# Patient Record
Sex: FEMALE | Race: WHITE | NOT HISPANIC OR LATINO | Employment: FULL TIME | URBAN - METROPOLITAN AREA
[De-identification: names, ages, dates, MRNs, and addresses within clinical notes are randomized per-mention and may not be internally consistent; named-entity substitution may affect disease eponyms.]

---

## 2017-02-02 ENCOUNTER — OFFICE VISIT (OUTPATIENT)
Dept: INTERNAL MEDICINE | Facility: CLINIC | Age: 47
End: 2017-02-02

## 2017-02-02 VITALS
HEIGHT: 62 IN | DIASTOLIC BLOOD PRESSURE: 70 MMHG | BODY MASS INDEX: 37.5 KG/M2 | SYSTOLIC BLOOD PRESSURE: 122 MMHG | WEIGHT: 203.8 LBS | TEMPERATURE: 97.6 F

## 2017-02-02 DIAGNOSIS — R53.82 CHRONIC FATIGUE: Primary | ICD-10-CM

## 2017-02-02 DIAGNOSIS — F32.89 OTHER DEPRESSION: ICD-10-CM

## 2017-02-02 DIAGNOSIS — K58.9 IRRITABLE BOWEL SYNDROME, UNSPECIFIED TYPE: ICD-10-CM

## 2017-02-02 PROCEDURE — 99214 OFFICE O/P EST MOD 30 MIN: CPT | Performed by: INTERNAL MEDICINE

## 2017-02-02 RX ORDER — ESTRADIOL 1 MG/1
1 TABLET ORAL DAILY
Qty: 30 TABLET | Refills: 6 | Status: SHIPPED | OUTPATIENT
Start: 2017-02-02 | End: 2017-09-26 | Stop reason: SDUPTHER

## 2017-02-02 RX ORDER — DICYCLOMINE HYDROCHLORIDE 10 MG/1
10 CAPSULE ORAL 3 TIMES DAILY PRN
Qty: 180 CAPSULE | Refills: 2 | Status: SHIPPED | OUTPATIENT
Start: 2017-02-02 | End: 2017-09-26 | Stop reason: SDUPTHER

## 2017-02-02 RX ORDER — BUPROPION HYDROCHLORIDE 150 MG/1
150 TABLET ORAL DAILY
Qty: 30 TABLET | Refills: 6 | Status: SHIPPED | OUTPATIENT
Start: 2017-02-02 | End: 2017-03-21

## 2017-02-02 RX ORDER — MELOXICAM 15 MG/1
15 TABLET ORAL DAILY PRN
Qty: 90 TABLET | Refills: 3 | Status: SHIPPED | OUTPATIENT
Start: 2017-02-02 | End: 2017-09-26 | Stop reason: SDUPTHER

## 2017-02-02 RX ORDER — LEVOCETIRIZINE DIHYDROCHLORIDE 5 MG/1
5 TABLET, FILM COATED ORAL EVERY EVENING
Qty: 90 TABLET | Refills: 3 | Status: SHIPPED | OUTPATIENT
Start: 2017-02-02 | End: 2017-03-21

## 2017-02-02 RX ORDER — CHLORDIAZEPOXIDE HYDROCHLORIDE AND CLIDINIUM BROMIDE 5; 2.5 MG/1; MG/1
1 CAPSULE ORAL
Qty: 90 CAPSULE | Refills: 2 | Status: SHIPPED | OUTPATIENT
Start: 2017-02-02 | End: 2017-03-21

## 2017-02-02 RX ORDER — CHLORDIAZEPOXIDE HYDROCHLORIDE AND CLIDINIUM BROMIDE 5; 2.5 MG/1; MG/1
1 CAPSULE ORAL
Qty: 90 CAPSULE | Refills: 2 | Status: CANCELLED | OUTPATIENT
Start: 2017-02-02

## 2017-02-02 RX ORDER — FENOFIBRATE 145 MG/1
145 TABLET, COATED ORAL DAILY
Qty: 90 TABLET | Refills: 3 | Status: SHIPPED | OUTPATIENT
Start: 2017-02-02 | End: 2017-03-21

## 2017-02-02 NOTE — PATIENT INSTRUCTIONS
Bupropion tablets (Depression/Mood Disorders)  What is this medicine?  BUPROPION (byoo PROE pee on) is used to treat depression.  This medicine may be used for other purposes; ask your health care provider or pharmacist if you have questions.  What should I tell my health care provider before I take this medicine?  They need to know if you have any of these conditions:  -an eating disorder, such as anorexia or bulimia  -bipolar disorder or psychosis  -diabetes or high blood sugar, treated with medication  -glaucoma  -heart disease, previous heart attack, or irregular heart beat  -head injury or brain tumor  -high blood pressure  -kidney or liver disease  -seizures  -suicidal thoughts or a previous suicide attempt  -Tourette's syndrome  -weight loss  -an unusual or allergic reaction to bupropion, other medicines, foods, dyes, or preservatives  -breast-feeding  -pregnant or trying to become pregnant  How should I use this medicine?  Take this medicine by mouth with a glass of water. Follow the directions on the prescription label. You can take it with or without food. If it upsets your stomach, take it with food. Take your medicine at regular intervals. Do not take your medicine more often than directed. Do not stop taking this medicine suddenly except upon the advice of your doctor. Stopping this medicine too quickly may cause serious side effects or your condition may worsen.  A special MedGuide will be given to you by the pharmacist with each prescription and refill. Be sure to read this information carefully each time.  Talk to your pediatrician regarding the use of this medicine in children. Special care may be needed.  Overdosage: If you think you have taken too much of this medicine contact a poison control center or emergency room at once.  NOTE: This medicine is only for you. Do not share this medicine with others.  What if I miss a dose?  If you miss a dose, take it as soon as you can. If it is less than  four hours to your next dose, take only that dose and skip the missed dose. Do not take double or extra doses.  What may interact with this medicine?  Do not take this medicine with any of the following medications:  -linezolid  -MAOIs like Azilect, Carbex, Eldepryl, Marplan, Nardil, and Parnate  -methylene blue (injected into a vein)  -other medicines that contain bupropion like Zyban  This medicine may also interact with the following medications:  -alcohol  -certain medicines for anxiety or sleep  -certain medicines for blood pressure like metoprolol, propranolol  -certain medicines for depression or psychotic disturbances  -certain medicines for HIV or AIDS like efavirenz, lopinavir, nelfinavir, ritonavir  -certain medicines for irregular heart beat like propafenone, flecainide  -certain medicines for Parkinson's disease like amantadine, levodopa  -certain medicines for seizures like carbamazepine, phenytoin, phenobarbital  -cimetidine  -clopidogrel  -cyclophosphamide  -furazolidone  -isoniazid  -nicotine  -orphenadrine  -procarbazine  -steroid medicines like prednisone or cortisone  -stimulant medicines for attention disorders, weight loss, or to stay awake  -tamoxifen  -theophylline  -thiotepa  -ticlopidine  -tramadol  -warfarin  This list may not describe all possible interactions. Give your health care provider a list of all the medicines, herbs, non-prescription drugs, or dietary supplements you use. Also tell them if you smoke, drink alcohol, or use illegal drugs. Some items may interact with your medicine.  What should I watch for while using this medicine?  Tell your doctor if your symptoms do not get better or if they get worse. Visit your doctor or health care professional for regular checks on your progress. Because it may take several weeks to see the full effects of this medicine, it is important to continue your treatment as prescribed by your doctor.  Patients and their families should watch out  for new or worsening thoughts of suicide or depression. Also watch out for sudden changes in feelings such as feeling anxious, agitated, panicky, irritable, hostile, aggressive, impulsive, severely restless, overly excited and hyperactive, or not being able to sleep. If this happens, especially at the beginning of treatment or after a change in dose, call your health care professional.  Avoid alcoholic drinks while taking this medicine. Drinking excessive alcoholic beverages, using sleeping or anxiety medicines, or quickly stopping the use of these agents while taking this medicine may increase your risk for a seizure.  Do not drive or use heavy machinery until you know how this medicine affects you. This medicine can impair your ability to perform these tasks.  Do not take this medicine close to bedtime. It may prevent you from sleeping.  Your mouth may get dry. Chewing sugarless gum or sucking hard candy, and drinking plenty of water may help. Contact your doctor if the problem does not go away or is severe.  What side effects may I notice from receiving this medicine?  Side effects that you should report to your doctor or health care professional as soon as possible:  -allergic reactions like skin rash, itching or hives, swelling of the face, lips, or tongue  -breathing problems  -changes in vision  -confusion  -fast or irregular heartbeat  -hallucinations  -increased blood pressure  -redness, blistering, peeling or loosening of the skin, including inside the mouth  -seizures  -suicidal thoughts or other mood changes  -unusually weak or tired  -vomiting  Side effects that usually do not require medical attention (report to your doctor or health care professional if they continue or are bothersome):  -change in sex drive or performance  -constipation  -headache  -loss of appetite  -nausea  -tremors  -weight loss  This list may not describe all possible side effects. Call your doctor for medical advice about side  effects. You may report side effects to FDA at 1-692-ILA-1713.  Where should I keep my medicine?  Keep out of the reach of children.  Store at room temperature between 15 and 25 degrees C (59 and 77 degrees F), away from direct sunlight and moisture. Keep tightly closed. Throw away any unused medicine after the expiration date.  NOTE: This sheet is a summary. It may not cover all possible information. If you have questions about this medicine, talk to your doctor, pharmacist, or health care provider.     © 2016, [employer]. (2014-07-11 12:42:42)

## 2017-02-02 NOTE — PROGRESS NOTES
"Subjective   Nita Mcmahon is a 46 y.o. female here for follow-up on her mood and hormone replacement. She reports total lack of energy and complete lack of sex drive. She also mentions that she had pneumonia at the beginning of the year that she is just now feeling better from. She also had a couple of isolated episodes of extreme epigastric pain that was relieved by OTC zantac. She reports her mood is good, sleeping well, when she is with family and in a group mood is relatively stable but has more trouble with decreased mood when she is alone. She is concerned about her weight and is sincere in her desire to make healthy changes in her diet and activity levels.    The following portions of the patient's history were reviewed and updated as appropriate: allergies, current medications, past medical history, past social history, past surgical history and problem list.    Review of Systems:  General: Lack of energy  CV: negative  Respiratory: negative  GI: Acid Reflux  Neuro: negative  Psych: Depression    Objective   Visit Vitals   • /70 (BP Location: Left arm, Patient Position: Sitting, Cuff Size: Adult)   • Temp 97.6 °F (36.4 °C) (Temporal Artery )   • Ht 62\" (157.5 cm)   • Wt 203 lb 12.8 oz (92.4 kg)   • BMI 37.28 kg/m2       Physical Exam   Constitutional: She is oriented to person, place, and time. She appears well-developed and well-nourished.   Pulmonary/Chest: Effort normal.   Abdominal: She exhibits no distension.   Neurological: She is alert and oriented to person, place, and time.   Skin: Skin is warm and dry.   Psychiatric: She has a normal mood and affect. Her behavior is normal. Judgment and thought content normal.   Vitals reviewed.      Assessment/Plan   Nita was seen today for follow-up.    Diagnoses and all orders for this visit:    Chronic fatigue  -labs previously normal, if change from paxil to wellbutrin doesn't help will repeat labs to see if adjustment needs to be made  -discussed " reduced calorie diet for weight loss (2962-0503 cals per day) and getting some exercise as tolerated as losing weight will likely help with fatigue    Irritable bowel syndrome, unspecified type  -     dicyclomine (BENTYL) 10 MG capsule; Take 1 capsule by mouth 3 (Three) Times a Day As Needed (abdominal pain or bloating/gas).  -     clidinium-chlordiazePOXIDE (LIBRAX) 5-2.5 MG per capsule; Take 1 capsule by mouth 3 (Three) Times a Day With Meals.    Other depression  -     buPROPion XL (WELLBUTRIN XL) 150 MG 24 hr tablet; Take 1 tablet by mouth Daily. New med  -     Taper off paxil 20mg for 1 week then either stop med or go qod then stop    Other orders  -     meloxicam (MOBIC) 15 MG tablet; Take 1 tablet by mouth Daily As Needed for moderate pain (4-6).  -     levocetirizine (XYZAL) 5 MG tablet; Take 1 tablet by mouth Every Evening.  -     fenofibrate (TRICOR) 145 MG tablet; Take 1 tablet by mouth Daily.  -     estradiol (ESTRACE) 1 MG tablet; Take 1 tablet by mouth Daily.  -     Cancel: clidinium-chlordiazePOXIDE (LIBRAX) 5-2.5 MG per capsule; Take 1 capsule by mouth 3 (Three) Times a Day With Meals.         Written  By Lesli Richter, acting as scribe for Dr. Mary Dixon.    I, Mary Dixon MD, personally performed the services described in this documentation as scribed by the above named individual in my presence, and it is both accurate and complete.  2/2/2017  12:28 PM

## 2017-02-13 ENCOUNTER — TELEPHONE (OUTPATIENT)
Dept: INTERNAL MEDICINE | Facility: CLINIC | Age: 47
End: 2017-02-13

## 2017-02-13 NOTE — TELEPHONE ENCOUNTER
----- Message from Mary Dixon MD sent at 2/13/2017  8:01 AM EST -----  Contact: [phone]  Have her come in for a fasting lipid profile. So I can see what I'm working with cholesterol wise.     Lm advising patient.nh    ----- Message -----     From: Jasmyne Mcfadden MA     Sent: 2/10/2017   1:16 PM       To: Mary Dixon MD     Is this ok?  ----- Message -----     From: Omari Betancourt     Sent: 2/10/2017   9:37 AM       To: Jasmyne Mcfadden MA    INSURANCE HAS CHANGED AND ISN'T COVERING TRICOR, PHARMACY TOLD HER TO TRY LOVASTATIN

## 2017-02-14 DIAGNOSIS — E78.2 MIXED HYPERLIPIDEMIA: Primary | ICD-10-CM

## 2017-02-14 LAB
ARTICHOKE IGE QN: 154 MG/DL (ref 0–130)
CHOLEST SERPL-MCNC: 232 MG/DL (ref 0–200)
HDLC SERPL-MCNC: 59 MG/DL (ref 40–60)
TRIGL SERPL-MCNC: 324 MG/DL (ref 0–150)

## 2017-02-14 PROCEDURE — 80061 LIPID PANEL: CPT | Performed by: INTERNAL MEDICINE

## 2017-02-16 ENCOUNTER — TELEPHONE (OUTPATIENT)
Dept: INTERNAL MEDICINE | Facility: CLINIC | Age: 47
End: 2017-02-16

## 2017-02-16 NOTE — TELEPHONE ENCOUNTER
----- Message from Mary Dixon MD sent at 2/15/2017  2:10 PM EST -----  Please call the patient and tell her cholesterol was high, but not high enough for me to want to continue her cholesterol medication right now so she will not get a new one. I will repeat the cholesterol panel again in 3 months to ensure that it hasn't gone up and would need a medication, but right now she doesn't.      Patient advised.nh

## 2017-03-21 ENCOUNTER — OFFICE VISIT (OUTPATIENT)
Dept: INTERNAL MEDICINE | Facility: CLINIC | Age: 47
End: 2017-03-21

## 2017-03-21 VITALS
BODY MASS INDEX: 37.25 KG/M2 | SYSTOLIC BLOOD PRESSURE: 120 MMHG | DIASTOLIC BLOOD PRESSURE: 68 MMHG | WEIGHT: 202.4 LBS | TEMPERATURE: 98.7 F | HEIGHT: 62 IN

## 2017-03-21 DIAGNOSIS — F41.0 PANIC ATTACK: ICD-10-CM

## 2017-03-21 DIAGNOSIS — F41.9 ANXIETY: Primary | ICD-10-CM

## 2017-03-21 DIAGNOSIS — N95.1 MENOPAUSAL SYNDROME: ICD-10-CM

## 2017-03-21 LAB
ESTRADIOL SERPL HS-MCNC: 119 PG/ML
FSH SERPL-ACNC: 52.3 MIU/ML
LH SERPL-ACNC: 43.2 MIU/ML
T4 FREE SERPL-MCNC: 1.01 NG/DL (ref 0.89–1.76)
TSH SERPL DL<=0.05 MIU/L-ACNC: 2.46 MIU/ML (ref 0.35–5.35)

## 2017-03-21 PROCEDURE — 82670 ASSAY OF TOTAL ESTRADIOL: CPT | Performed by: INTERNAL MEDICINE

## 2017-03-21 PROCEDURE — 84439 ASSAY OF FREE THYROXINE: CPT | Performed by: INTERNAL MEDICINE

## 2017-03-21 PROCEDURE — 83002 ASSAY OF GONADOTROPIN (LH): CPT | Performed by: INTERNAL MEDICINE

## 2017-03-21 PROCEDURE — 84443 ASSAY THYROID STIM HORMONE: CPT | Performed by: INTERNAL MEDICINE

## 2017-03-21 PROCEDURE — 36415 COLL VENOUS BLD VENIPUNCTURE: CPT | Performed by: INTERNAL MEDICINE

## 2017-03-21 PROCEDURE — 83001 ASSAY OF GONADOTROPIN (FSH): CPT | Performed by: INTERNAL MEDICINE

## 2017-03-21 PROCEDURE — 99213 OFFICE O/P EST LOW 20 MIN: CPT | Performed by: INTERNAL MEDICINE

## 2017-03-21 PROCEDURE — 82672 ASSAY OF ESTROGEN: CPT | Performed by: INTERNAL MEDICINE

## 2017-03-21 RX ORDER — BUPROPION HYDROCHLORIDE 75 MG/1
75 TABLET ORAL 2 TIMES DAILY
Qty: 17 TABLET | Refills: 0 | Status: SHIPPED | OUTPATIENT
Start: 2017-03-21 | End: 2017-04-20

## 2017-03-21 RX ORDER — ESCITALOPRAM OXALATE 20 MG/1
20 TABLET ORAL DAILY
Qty: 30 TABLET | Refills: 5 | Status: SHIPPED | OUTPATIENT
Start: 2017-03-21 | End: 2017-09-22 | Stop reason: SDUPTHER

## 2017-03-21 NOTE — PROGRESS NOTES
"Subjective   Nita Mcmahon is a 46 y.o. female here for follow-up anxiety. She was not able to tolerate coming off of the paxil, she felt dizzy and \"off\" and is still on the 20mg which is only half her regular dose. Wellbutrin did not help her anxiety, it seems to have made it worse.  It also caused vivid dreams and pt felt like she wasn't really sleeping well as her dreams were so long and involved. She had a panic attack at the mall the other day. Has always had anxiety but just getting worse. Difficulty falling asleep as well as anxiety is increased at night.      The following portions of the patient's history were reviewed and updated as appropriate: allergies, current medications, past medical history, past social history and problem list.    Review of Systems:  General: negative  CV: negative  Respiratory: negative  Neuro: insomnia  Psych: anxiety, panic attack    Objective   Visit Vitals   • /68 (BP Location: Right arm, Patient Position: Sitting, Cuff Size: Adult)   • Temp 98.7 °F (37.1 °C) (Temporal Artery )   • Ht 62\" (157.5 cm)   • Wt 202 lb 6.4 oz (91.8 kg)   • BMI 37.02 kg/m2       Physical Exam   Constitutional: She is oriented to person, place, and time. She appears well-developed and well-nourished.   Pulmonary/Chest: Effort normal.   Neurological: She is alert and oriented to person, place, and time.   Skin: Skin is warm and dry.   Psychiatric: She has a normal mood and affect. Her behavior is normal. Judgment and thought content normal.   Vitals reviewed.      Assessment/Plan   Nita was seen today for anxiety and depression.    Diagnoses and all orders for this visit:    Anxiety  -     buPROPion (WELLBUTRIN) 75 MG tablet; Take 1 tablet by mouth 2 (Two) Times a Day. For 3 days, then 1 tablet midday for 1 week, then stop medication.  -     escitalopram (LEXAPRO) 20 MG tablet; Take 1 tablet by mouth Daily. Directly substitute for Paxil  -     Discussed psych treatment with pt and how it is " an evolving process to find the right med and unfortunately we will probably have to try several different meds to find one that        adequately controls her sx  -     TSH  -     T4, Free  -     Estrogens, Total  -     Estradiol  -     Follicle Stimulating Hormone  -     Luteinizing Hormone    Panic attack  -     buPROPion (WELLBUTRIN) 75 MG tablet; Take 1 tablet by mouth 2 (Two) Times a Day. For 3 days, then 1 tablet midday for 1 week, then stop medication.  -     escitalopram (LEXAPRO) 20 MG tablet; Take 1 tablet by mouth Daily.  -     TSH  -     T4, Free  -     Estrogens, Total  -     Estradiol  -     Follicle Stimulating Hormone  -     Luteinizing Hormone    Menopausal syndrome  -     TSH  -     T4, Free  -     Estrogens, Total  -     Estradiol  -     Follicle Stimulating Hormone  -     Luteinizing Hormone

## 2017-03-21 NOTE — PATIENT INSTRUCTIONS
Escitalopram tablets  What is this medicine?  ESCITALOPRAM (es sye PINKY oh pram) is used to treat depression and certain types of anxiety.  This medicine may be used for other purposes; ask your health care provider or pharmacist if you have questions.  COMMON BRAND NAME(S): Lexapro  What should I tell my health care provider before I take this medicine?  They need to know if you have any of these conditions:  -bipolar disorder or a family history of bipolar disorder  -diabetes  -glaucoma  -heart disease  -kidney or liver disease  -receiving electroconvulsive therapy  -seizures (convulsions)  -suicidal thoughts, plans, or attempt by you or a family member  -an unusual or allergic reaction to escitalopram, the related drug citalopram, other medicines, foods, dyes, or preservatives  -pregnant or trying to become pregnant  -breast-feeding  How should I use this medicine?  Take this medicine by mouth with a glass of water. Follow the directions on the prescription label. You can take it with or without food. If it upsets your stomach, take it with food. Take your medicine at regular intervals. Do not take it more often than directed. Do not stop taking this medicine suddenly except upon the advice of your doctor. Stopping this medicine too quickly may cause serious side effects or your condition may worsen.  A special MedGuide will be given to you by the pharmacist with each prescription and refill. Be sure to read this information carefully each time.  Talk to your pediatrician regarding the use of this medicine in children. Special care may be needed.  Overdosage: If you think you have taken too much of this medicine contact a poison control center or emergency room at once.  NOTE: This medicine is only for you. Do not share this medicine with others.  What if I miss a dose?  If you miss a dose, take it as soon as you can. If it is almost time for your next dose, take only that dose. Do not take double or extra  doses.  What may interact with this medicine?  Do not take this medicine with any of the following medications:  -certain medicines for fungal infections like fluconazole, itraconazole, ketoconazole, posaconazole, voriconazole  -cisapride  -citalopram  -dofetilide  -dronedarone  -linezolid  -MAOIs like Carbex, Eldepryl, Marplan, Nardil, and Parnate  -methylene blue (injected into a vein)  -pimozide  -thioridazine  -ziprasidone  This medicine may also interact with the following medications:  -alcohol  -aspirin and aspirin-like medicines  -carbamazepine  -certain medicines for depression, anxiety, or psychotic disturbances  -certain medicines for migraine headache like almotriptan, eletriptan, frovatriptan, naratriptan, rizatriptan, sumatriptan, zolmitriptan  -certain medicines for sleep  -certain medicines that treat or prevent blood clots like warfarin, enoxaparin, dalteparin  -cimetidine  -diuretics  -fentanyl  -furazolidone  -isoniazid  -lithium  -metoprolol  -NSAIDs, medicines for pain and inflammation, like ibuprofen or naproxen  -other medicines that prolong the QT interval (cause an abnormal heart rhythm)  -procarbazine  -rasagiline  -supplements like [place]'s wort, kava kava, valerian  -tramadol  -tryptophan  This list may not describe all possible interactions. Give your health care provider a list of all the medicines, herbs, non-prescription drugs, or dietary supplements you use. Also tell them if you smoke, drink alcohol, or use illegal drugs. Some items may interact with your medicine.  What should I watch for while using this medicine?  Tell your doctor if your symptoms do not get better or if they get worse. Visit your doctor or health care professional for regular checks on your progress. Because it may take several weeks to see the full effects of this medicine, it is important to continue your treatment as prescribed by your doctor.  Patients and their families should watch out for new or  worsening thoughts of suicide or depression. Also watch out for sudden changes in feelings such as feeling anxious, agitated, panicky, irritable, hostile, aggressive, impulsive, severely restless, overly excited and hyperactive, or not being able to sleep. If this happens, especially at the beginning of treatment or after a change in dose, call your health care professional.  You may get drowsy or dizzy. Do not drive, use machinery, or do anything that needs mental alertness until you know how this medicine affects you. Do not stand or sit up quickly, especially if you are an older patient. This reduces the risk of dizzy or fainting spells. Alcohol may interfere with the effect of this medicine. Avoid alcoholic drinks.  Your mouth may get dry. Chewing sugarless gum or sucking hard candy, and drinking plenty of water may help. Contact your doctor if the problem does not go away or is severe.  What side effects may I notice from receiving this medicine?  Side effects that you should report to your doctor or health care professional as soon as possible:  -allergic reactions like skin rash, itching or hives, swelling of the face, lips, or tongue  -confusion  -feeling faint or lightheaded, falls  -fast talking and excited feelings or actions that are out of control  -hallucination, loss of contact with reality  -seizures  -suicidal thoughts or other mood changes  -unusual bleeding or bruising  Side effects that usually do not require medical attention (report to your doctor or health care professional if they continue or are bothersome):  -blurred vision  -changes in appetite  -change in sex drive or performance  -headache  -increased sweating  -nausea  This list may not describe all possible side effects. Call your doctor for medical advice about side effects. You may report side effects to FDA at 3-161-FDA-0182.  Where should I keep my medicine?  Keep out of reach of children.  Store at room temperature between 15 and  30 degrees C (59 and 86 degrees F). Throw away any unused medicine after the expiration date.  NOTE: This sheet is a summary. It may not cover all possible information. If you have questions about this medicine, talk to your doctor, pharmacist, or health care provider.     © 2016, [employer]. (2014-07-15 12:32:55)

## 2017-03-23 LAB — ESTROGEN SERPL-MCNC: 328 PG/ML

## 2017-04-20 ENCOUNTER — OFFICE VISIT (OUTPATIENT)
Dept: INTERNAL MEDICINE | Facility: CLINIC | Age: 47
End: 2017-04-20

## 2017-04-20 VITALS
SYSTOLIC BLOOD PRESSURE: 112 MMHG | TEMPERATURE: 97.4 F | HEIGHT: 62 IN | WEIGHT: 205 LBS | DIASTOLIC BLOOD PRESSURE: 64 MMHG | BODY MASS INDEX: 37.73 KG/M2

## 2017-04-20 DIAGNOSIS — F41.9 ANXIETY AND DEPRESSION: Primary | ICD-10-CM

## 2017-04-20 DIAGNOSIS — F32.A ANXIETY AND DEPRESSION: Primary | ICD-10-CM

## 2017-04-20 PROCEDURE — 99213 OFFICE O/P EST LOW 20 MIN: CPT | Performed by: INTERNAL MEDICINE

## 2017-04-20 RX ORDER — LEVOCETIRIZINE DIHYDROCHLORIDE 5 MG/1
5 TABLET, FILM COATED ORAL EVERY EVENING
COMMUNITY
End: 2018-04-12

## 2017-04-20 NOTE — PROGRESS NOTES
"Subjective   Nita Mcmahon is a 47 y.o. female here for follow-up A&D. She was on paxil and wellbutrin last visit and continued to have irritability, no energy, and A&D. She successfully came off the wellbutrin and started lexapro and mood is much improved. She is very pleased with this. Says  has noticed that she is doing much better mood wise as well. Denies any undesirable SE.       The following portions of the patient's history were reviewed and updated as appropriate: allergies, current medications, past medical history, past social history and problem list.    Review of Systems:  General: negative  GI: negative  Neuro: negative  Psych: negative    Objective   /64 (BP Location: Right arm, Patient Position: Sitting, Cuff Size: Adult)  Temp 97.4 °F (36.3 °C) (Temporal Artery )   Ht 62\" (157.5 cm)  Wt 205 lb (93 kg)  BMI 37.49 kg/m2    Physical Exam   Constitutional: She is oriented to person, place, and time. She appears well-developed and well-nourished.   Pulmonary/Chest: Effort normal.   Neurological: She is alert and oriented to person, place, and time.   Skin: Skin is warm and dry.   Psychiatric: She has a normal mood and affect. Her behavior is normal. Judgment and thought content normal.   Vitals reviewed.      Assessment/Plan   Nita was seen today for anxiety and depression.    Diagnoses and all orders for this visit:    Anxiety and depression  -doing well on the Lexapro and is off the wellbutrin. Pt pleased with overall mood and not having any undesirable SE       "

## 2017-04-20 NOTE — PATIENT INSTRUCTIONS
Escitalopram tablets  What is this medicine?  ESCITALOPRAM (es sye PINKY oh pram) is used to treat depression and certain types of anxiety.  This medicine may be used for other purposes; ask your health care provider or pharmacist if you have questions.  COMMON BRAND NAME(S): Lexapro  What should I tell my health care provider before I take this medicine?  They need to know if you have any of these conditions:  -bipolar disorder or a family history of bipolar disorder  -diabetes  -glaucoma  -heart disease  -kidney or liver disease  -receiving electroconvulsive therapy  -seizures (convulsions)  -suicidal thoughts, plans, or attempt by you or a family member  -an unusual or allergic reaction to escitalopram, the related drug citalopram, other medicines, foods, dyes, or preservatives  -pregnant or trying to become pregnant  -breast-feeding  How should I use this medicine?  Take this medicine by mouth with a glass of water. Follow the directions on the prescription label. You can take it with or without food. If it upsets your stomach, take it with food. Take your medicine at regular intervals. Do not take it more often than directed. Do not stop taking this medicine suddenly except upon the advice of your doctor. Stopping this medicine too quickly may cause serious side effects or your condition may worsen.  A special MedGuide will be given to you by the pharmacist with each prescription and refill. Be sure to read this information carefully each time.  Talk to your pediatrician regarding the use of this medicine in children. Special care may be needed.  Overdosage: If you think you have taken too much of this medicine contact a poison control center or emergency room at once.  NOTE: This medicine is only for you. Do not share this medicine with others.  What if I miss a dose?  If you miss a dose, take it as soon as you can. If it is almost time for your next dose, take only that dose. Do not take double or extra  doses.  What may interact with this medicine?  Do not take this medicine with any of the following medications:  -certain medicines for fungal infections like fluconazole, itraconazole, ketoconazole, posaconazole, voriconazole  -cisapride  -citalopram  -dofetilide  -dronedarone  -linezolid  -MAOIs like Carbex, Eldepryl, Marplan, Nardil, and Parnate  -methylene blue (injected into a vein)  -pimozide  -thioridazine  -ziprasidone  This medicine may also interact with the following medications:  -alcohol  -amphetamines  -aspirin and aspirin-like medicines  -carbamazepine  -certain medicines for depression, anxiety, or psychotic disturbances  -certain medicines for migraine headache like almotriptan, eletriptan, frovatriptan, naratriptan, rizatriptan, sumatriptan, zolmitriptan  -certain medicines for sleep  -certain medicines that treat or prevent blood clots like warfarin, enoxaparin, dalteparin  -cimetidine  -diuretics  -fentanyl  -furazolidone  -isoniazid  -lithium  -metoprolol  -NSAIDs, medicines for pain and inflammation, like ibuprofen or naproxen  -other medicines that prolong the QT interval (cause an abnormal heart rhythm)  -procarbazine  -rasagiline  -supplements like [place]'s wort, kava kava, valerian  -tramadol  -tryptophan  This list may not describe all possible interactions. Give your health care provider a list of all the medicines, herbs, non-prescription drugs, or dietary supplements you use. Also tell them if you smoke, drink alcohol, or use illegal drugs. Some items may interact with your medicine.  What should I watch for while using this medicine?  Tell your doctor if your symptoms do not get better or if they get worse. Visit your doctor or health care professional for regular checks on your progress. Because it may take several weeks to see the full effects of this medicine, it is important to continue your treatment as prescribed by your doctor.  Patients and their families should watch out for  new or worsening thoughts of suicide or depression. Also watch out for sudden changes in feelings such as feeling anxious, agitated, panicky, irritable, hostile, aggressive, impulsive, severely restless, overly excited and hyperactive, or not being able to sleep. If this happens, especially at the beginning of treatment or after a change in dose, call your health care professional.  You may get drowsy or dizzy. Do not drive, use machinery, or do anything that needs mental alertness until you know how this medicine affects you. Do not stand or sit up quickly, especially if you are an older patient. This reduces the risk of dizzy or fainting spells. Alcohol may interfere with the effect of this medicine. Avoid alcoholic drinks.  Your mouth may get dry. Chewing sugarless gum or sucking hard candy, and drinking plenty of water may help. Contact your doctor if the problem does not go away or is severe.  What side effects may I notice from receiving this medicine?  Side effects that you should report to your doctor or health care professional as soon as possible:  -allergic reactions like skin rash, itching or hives, swelling of the face, lips, or tongue  -confusion  -feeling faint or lightheaded, falls  -fast talking and excited feelings or actions that are out of control  -hallucination, loss of contact with reality  -seizures  -suicidal thoughts or other mood changes  -unusual bleeding or bruising  Side effects that usually do not require medical attention (report to your doctor or health care professional if they continue or are bothersome):  -blurred vision  -changes in appetite  -change in sex drive or performance  -headache  -increased sweating  -nausea  This list may not describe all possible side effects. Call your doctor for medical advice about side effects. You may report side effects to FDA at 0-096-FDA-9637.  Where should I keep my medicine?  Keep out of reach of children.  Store at room temperature between  15 and 30 degrees C (59 and 86 degrees F). Throw away any unused medicine after the expiration date.  NOTE: This sheet is a summary. It may not cover all possible information. If you have questions about this medicine, talk to your doctor, pharmacist, or health care provider.     © 2017, [employer]. (2016-12-12 17:52:54)

## 2017-05-09 ENCOUNTER — OFFICE VISIT (OUTPATIENT)
Dept: INTERNAL MEDICINE | Facility: CLINIC | Age: 47
End: 2017-05-09

## 2017-05-09 VITALS
DIASTOLIC BLOOD PRESSURE: 64 MMHG | BODY MASS INDEX: 37.47 KG/M2 | SYSTOLIC BLOOD PRESSURE: 118 MMHG | HEIGHT: 62 IN | TEMPERATURE: 97.1 F | WEIGHT: 203.6 LBS

## 2017-05-09 DIAGNOSIS — K80.50 BILIARY COLIC: Primary | ICD-10-CM

## 2017-05-09 PROCEDURE — 99213 OFFICE O/P EST LOW 20 MIN: CPT | Performed by: INTERNAL MEDICINE

## 2017-09-22 DIAGNOSIS — F41.9 ANXIETY: ICD-10-CM

## 2017-09-22 DIAGNOSIS — F41.0 PANIC ATTACK: ICD-10-CM

## 2017-09-22 RX ORDER — ESCITALOPRAM OXALATE 20 MG/1
20 TABLET ORAL DAILY
Qty: 30 TABLET | Refills: 5 | Status: SHIPPED | OUTPATIENT
Start: 2017-09-22 | End: 2017-11-07

## 2017-09-26 ENCOUNTER — OFFICE VISIT (OUTPATIENT)
Dept: INTERNAL MEDICINE | Facility: CLINIC | Age: 47
End: 2017-09-26

## 2017-09-26 VITALS
BODY MASS INDEX: 35.08 KG/M2 | HEART RATE: 85 BPM | SYSTOLIC BLOOD PRESSURE: 126 MMHG | WEIGHT: 198 LBS | TEMPERATURE: 97.6 F | DIASTOLIC BLOOD PRESSURE: 82 MMHG | OXYGEN SATURATION: 99 % | HEIGHT: 63 IN | RESPIRATION RATE: 18 BRPM

## 2017-09-26 DIAGNOSIS — K58.9 IRRITABLE BOWEL SYNDROME, UNSPECIFIED TYPE: ICD-10-CM

## 2017-09-26 DIAGNOSIS — F32.A ANXIETY AND DEPRESSION: Primary | ICD-10-CM

## 2017-09-26 DIAGNOSIS — F41.9 ANXIETY AND DEPRESSION: Primary | ICD-10-CM

## 2017-09-26 PROCEDURE — 99213 OFFICE O/P EST LOW 20 MIN: CPT | Performed by: INTERNAL MEDICINE

## 2017-09-26 RX ORDER — MELOXICAM 15 MG/1
15 TABLET ORAL DAILY PRN
Qty: 90 TABLET | Refills: 3 | Status: SHIPPED | OUTPATIENT
Start: 2017-09-26 | End: 2018-04-12 | Stop reason: SDUPTHER

## 2017-09-26 RX ORDER — ESTRADIOL 1 MG/1
1 TABLET ORAL DAILY
Qty: 30 TABLET | Refills: 6 | Status: SHIPPED | OUTPATIENT
Start: 2017-09-26 | End: 2018-04-12 | Stop reason: SDUPTHER

## 2017-09-26 RX ORDER — CITALOPRAM 40 MG/1
40 TABLET ORAL DAILY
Qty: 30 TABLET | Refills: 5 | Status: SHIPPED | OUTPATIENT
Start: 2017-09-26 | End: 2018-04-12 | Stop reason: SDUPTHER

## 2017-09-26 RX ORDER — DICYCLOMINE HYDROCHLORIDE 10 MG/1
10 CAPSULE ORAL 3 TIMES DAILY PRN
Qty: 180 CAPSULE | Refills: 2 | Status: SHIPPED | OUTPATIENT
Start: 2017-09-26 | End: 2018-04-12 | Stop reason: SDUPTHER

## 2017-09-26 NOTE — PATIENT INSTRUCTIONS
Citalopram tablets  What is this medicine?  CITALOPRAM (sye PINKY oh pram) is a medicine for depression.  This medicine may be used for other purposes; ask your health care provider or pharmacist if you have questions.  COMMON BRAND NAME(S): Celexa  What should I tell my health care provider before I take this medicine?  They need to know if you have any of these conditions:  -bleeding disorders  -bipolar disorder or a family history of bipolar disorder  -glaucoma  -heart disease  -history of irregular heartbeat  -kidney disease  -liver disease  -low levels of magnesium or potassium in the blood  -receiving electroconvulsive therapy  -seizures  -suicidal thoughts, plans, or attempt; a previous suicide attempt by you or a family member  -take medicines that treat or prevent blood clots  -thyroid disease  -an unusual or allergic reaction to citalopram, escitalopram, other medicines, foods, dyes, or preservatives  -pregnant or trying to become pregnant  -breast-feeding  How should I use this medicine?  Take this medicine by mouth with a glass of water. Follow the directions on the prescription label. You can take it with or without food. Take your medicine at regular intervals. Do not take your medicine more often than directed. Do not stop taking this medicine suddenly except upon the advice of your doctor. Stopping this medicine too quickly may cause serious side effects or your condition may worsen.  A special MedGuide will be given to you by the pharmacist with each prescription and refill. Be sure to read this information carefully each time.  Talk to your pediatrician regarding the use of this medicine in children. Special care may be needed.  Patients over 60 years old may have a stronger reaction and need a smaller dose.  Overdosage: If you think you have taken too much of this medicine contact a poison control center or emergency room at once.  NOTE: This medicine is only for you. Do not share this medicine with  others.  What if I miss a dose?  If you miss a dose, take it as soon as you can. If it is almost time for your next dose, take only that dose. Do not take double or extra doses.  What may interact with this medicine?  Do not take this medicine with any of the following medications:  -certain medicines for fungal infections like fluconazole, itraconazole, ketoconazole, posaconazole, voriconazole  -cisapride  -dofetilide  -dronedarone  -escitalopram  -linezolid  -MAOIs like Carbex, Eldepryl, Marplan, Nardil, and Parnate  -methylene blue (injected into a vein)  -pimozide  -thioridazine  -ziprasidone  This medicine may also interact with the following medications:  -alcohol  -amphetamines  -aspirin and aspirin-like medicines  -carbamazepine  -certain medicines for depression, anxiety, or psychotic disturbances  -certain medicines for infections like chloroquine, clarithromycin, erythromycin, furazolidone, isoniazid, pentamidine  -certain medicines for migraine headaches like almotriptan, eletriptan, frovatriptan, naratriptan, rizatriptan, sumatriptan, zolmitriptan  -certain medicines for sleep  -certain medicines that treat or prevent blood clots like dalteparin, enoxaparin, warfarin  -cimetidine  -diuretics  -fentanyl  -lithium  -methadone  -metoprolol  -NSAIDs, medicines for pain and inflammation, like ibuprofen or naproxen  -omeprazole  -other medicines that prolong the QT interval (cause an abnormal heart rhythm)  -procarbazine  -rasagiline  -supplements like Jeffrey's wort, kava kava, valerian  -tramadol  -tryptophan  This list may not describe all possible interactions. Give your health care provider a list of all the medicines, herbs, non-prescription drugs, or dietary supplements you use. Also tell them if you smoke, drink alcohol, or use illegal drugs. Some items may interact with your medicine.  What should I watch for while using this medicine?  Tell your doctor if your symptoms do not get better or if they  get worse. Visit your doctor or health care professional for regular checks on your progress. Because it may take several weeks to see the full effects of this medicine, it is important to continue your treatment as prescribed by your doctor.  Patients and their families should watch out for new or worsening thoughts of suicide or depression. Also watch out for sudden changes in feelings such as feeling anxious, agitated, panicky, irritable, hostile, aggressive, impulsive, severely restless, overly excited and hyperactive, or not being able to sleep. If this happens, especially at the beginning of treatment or after a change in dose, call your health care professional.  You may get drowsy or dizzy. Do not drive, use machinery, or do anything that needs mental alertness until you know how this medicine affects you. Do not stand or sit up quickly, especially if you are an older patient. This reduces the risk of dizzy or fainting spells. Alcohol may interfere with the effect of this medicine. Avoid alcoholic drinks.  Your mouth may get dry. Chewing sugarless gum or sucking hard candy, and drinking plenty of water will help. Contact your doctor if the problem does not go away or is severe.  What side effects may I notice from receiving this medicine?  Side effects that you should report to your doctor or health care professional as soon as possible:  -allergic reactions like skin rash, itching or hives, swelling of the face, lips, or tongue  -black, tarry stools  -breathing problems  -changes in vision  -chest pain  -confusion  -eye pain  -fast, irregular heartbeat  -feeling faint or lightheaded, falls  -hallucination, loss of contact with reality  -loss of balance or coordination  -loss of memory  -restlessness, pacing, inability to keep still  -seizures  -stiff muscles  -suicidal thoughts or other mood changes  -trouble sleeping  -unusual bleeding or bruising  -unusually weak or tired  -vomiting  Side effects that  usually do not require medical attention (report to your doctor or health care professional if they continue or are bothersome):  -change in appetite or weight  -change in sex drive or performance  -dizziness  -headache  -increased sweating  -indigestion, nausea  -tremor  This list may not describe all possible side effects. Call your doctor for medical advice about side effects. You may report side effects to FDA at 1-225-GTA-5075.  Where should I keep my medicine?  Keep out of reach of children.  Store at room temperature between 15 and 30 degrees C (59 and 86 degrees F). Throw away any unused medicine after the expiration date.  NOTE: This sheet is a summary. It may not cover all possible information. If you have questions about this medicine, talk to your doctor, pharmacist, or health care provider.     © 2017, [employer]. (2017-02-07 11:53:44)

## 2017-09-26 NOTE — PROGRESS NOTES
"Subjective   Nita Mcmahon is a 47 y.o. female here for follow-up A&D. The lexapro had been doing well for her mood wise, but she is having some negative SE. She feels fatigued all day, could nap all day if she was able. Has little motivation to get out and do things and has lost enjoyment of things she once liked to do. Trouble sleeping as well. Had transitioned her 20mg lexapro from night to morning and this has been making her even more tired. Just filled lexapro rx. Has also tried wellbutrin and paxil in the past with failure.    The following portions of the patient's history were reviewed and updated as appropriate: allergies, current medications, past medical history, past social history and problem list.    Review of Systems:  General: fatigue  Neuro: negative  Psych: see hpi    Objective   /82  Pulse 85  Temp 97.6 °F (36.4 °C) (Temporal Artery )   Resp 18  Ht 62.5\" (158.8 cm)  Wt 198 lb (89.8 kg)  SpO2 99%  BMI 35.64 kg/m2    Physical Exam   Constitutional: She is oriented to person, place, and time. She appears well-developed and well-nourished.   Pulmonary/Chest: Effort normal.   Neurological: She is alert and oriented to person, place, and time.   Skin: Skin is warm and dry.   Psychiatric: She has a normal mood and affect. Her behavior is normal. Judgment and thought content normal.   Vitals reviewed.      Assessment/Plan   Nita was seen today for anxiety.    Diagnoses and all orders for this visit:    Anxiety and depression  -     citalopram (CELEXA) 40 MG tablet; Take 1 tablet by mouth Daily.  -     Will take 1/2 tab lexapro BID for a few days then switch to 20mg qhs as she is experiencing fatigue. Will then switch to citalopram when lexapro runs out    Irritable bowel syndrome, unspecified type  -     dicyclomine (BENTYL) 10 MG capsule; Take 1 capsule by mouth 3 (Three) Times a Day As Needed (abdominal pain or bloating/gas).    Other orders  -     meloxicam (MOBIC) 15 MG tablet; Take " 1 tablet by mouth Daily As Needed for Moderate Pain .  -     estradiol (ESTRACE) 1 MG tablet; Take 1 tablet by mouth Daily.

## 2017-11-07 ENCOUNTER — OFFICE VISIT (OUTPATIENT)
Dept: INTERNAL MEDICINE | Facility: CLINIC | Age: 47
End: 2017-11-07

## 2017-11-07 VITALS
RESPIRATION RATE: 12 BRPM | OXYGEN SATURATION: 96 % | WEIGHT: 200.2 LBS | DIASTOLIC BLOOD PRESSURE: 84 MMHG | HEIGHT: 62 IN | BODY MASS INDEX: 36.84 KG/M2 | HEART RATE: 87 BPM | SYSTOLIC BLOOD PRESSURE: 138 MMHG | TEMPERATURE: 96.3 F

## 2017-11-07 DIAGNOSIS — F41.9 ANXIETY AND DEPRESSION: Primary | ICD-10-CM

## 2017-11-07 DIAGNOSIS — F32.A ANXIETY AND DEPRESSION: Primary | ICD-10-CM

## 2017-11-07 PROCEDURE — 99213 OFFICE O/P EST LOW 20 MIN: CPT | Performed by: INTERNAL MEDICINE

## 2017-11-07 NOTE — PROGRESS NOTES
"Subjective   Nita Mcmahon is a 47 y.o. female here for follow-up A&D. She has now been on the full dose of Celexa for 2 weeks now and finally her mood has improved. She has noticed an improvement in a short time and she is very pleased about this.  No negative SE.  Wants to continue on with this med.           The following portions of the patient's history were reviewed and updated as appropriate: allergies, current medications, past medical history, past social history and problem list.    Review of Systems:  General: negative  Neuro: negative  Psych: negative    Objective   /84  Pulse 87  Temp 96.3 °F (35.7 °C) (Temporal Artery )   Resp 12  Ht 62\" (157.5 cm)  Wt 200 lb 3.2 oz (90.8 kg)  SpO2 96%  BMI 36.62 kg/m2    Physical Exam   Constitutional: She is oriented to person, place, and time. She appears well-developed and well-nourished.   Pulmonary/Chest: Effort normal.   Neurological: She is alert and oriented to person, place, and time.   Skin: Skin is warm and dry.   Psychiatric: She has a normal mood and affect. Her behavior is normal. Judgment and thought content normal.   Vitals reviewed.      Assessment/Plan   Nita was seen today for anxiety and depression.    Diagnoses and all orders for this visit:    Anxiety and depression  -doing well on celexa, mood is improved  -continue on current dose        "

## 2017-11-07 NOTE — PATIENT INSTRUCTIONS
Citalopram tablets  What is this medicine?  CITALOPRAM (sye PINKY oh pram) is a medicine for depression.  This medicine may be used for other purposes; ask your health care provider or pharmacist if you have questions.  COMMON BRAND NAME(S): Celexa  What should I tell my health care provider before I take this medicine?  They need to know if you have any of these conditions:  -bleeding disorders  -bipolar disorder or a family history of bipolar disorder  -glaucoma  -heart disease  -history of irregular heartbeat  -kidney disease  -liver disease  -low levels of magnesium or potassium in the blood  -receiving electroconvulsive therapy  -seizures  -suicidal thoughts, plans, or attempt; a previous suicide attempt by you or a family member  -take medicines that treat or prevent blood clots  -thyroid disease  -an unusual or allergic reaction to citalopram, escitalopram, other medicines, foods, dyes, or preservatives  -pregnant or trying to become pregnant  -breast-feeding  How should I use this medicine?  Take this medicine by mouth with a glass of water. Follow the directions on the prescription label. You can take it with or without food. Take your medicine at regular intervals. Do not take your medicine more often than directed. Do not stop taking this medicine suddenly except upon the advice of your doctor. Stopping this medicine too quickly may cause serious side effects or your condition may worsen.  A special MedGuide will be given to you by the pharmacist with each prescription and refill. Be sure to read this information carefully each time.  Talk to your pediatrician regarding the use of this medicine in children. Special care may be needed.  Patients over 60 years old may have a stronger reaction and need a smaller dose.  Overdosage: If you think you have taken too much of this medicine contact a poison control center or emergency room at once.  NOTE: This medicine is only for you. Do not share this medicine with  others.  What if I miss a dose?  If you miss a dose, take it as soon as you can. If it is almost time for your next dose, take only that dose. Do not take double or extra doses.  What may interact with this medicine?  Do not take this medicine with any of the following medications:  -certain medicines for fungal infections like fluconazole, itraconazole, ketoconazole, posaconazole, voriconazole  -cisapride  -dofetilide  -dronedarone  -escitalopram  -linezolid  -MAOIs like Carbex, Eldepryl, Marplan, Nardil, and Parnate  -methylene blue (injected into a vein)  -pimozide  -thioridazine  -ziprasidone  This medicine may also interact with the following medications:  -alcohol  -amphetamines  -aspirin and aspirin-like medicines  -carbamazepine  -certain medicines for depression, anxiety, or psychotic disturbances  -certain medicines for infections like chloroquine, clarithromycin, erythromycin, furazolidone, isoniazid, pentamidine  -certain medicines for migraine headaches like almotriptan, eletriptan, frovatriptan, naratriptan, rizatriptan, sumatriptan, zolmitriptan  -certain medicines for sleep  -certain medicines that treat or prevent blood clots like dalteparin, enoxaparin, warfarin  -cimetidine  -diuretics  -fentanyl  -lithium  -methadone  -metoprolol  -NSAIDs, medicines for pain and inflammation, like ibuprofen or naproxen  -omeprazole  -other medicines that prolong the QT interval (cause an abnormal heart rhythm)  -procarbazine  -rasagiline  -supplements like [place]'s wort, kava kava, valerian  -tramadol  -tryptophan  This list may not describe all possible interactions. Give your health care provider a list of all the medicines, herbs, non-prescription drugs, or dietary supplements you use. Also tell them if you smoke, drink alcohol, or use illegal drugs. Some items may interact with your medicine.  What should I watch for while using this medicine?  Tell your doctor if your symptoms do not get better or if they  get worse. Visit your doctor or health care professional for regular checks on your progress. Because it may take several weeks to see the full effects of this medicine, it is important to continue your treatment as prescribed by your doctor.  Patients and their families should watch out for new or worsening thoughts of suicide or depression. Also watch out for sudden changes in feelings such as feeling anxious, agitated, panicky, irritable, hostile, aggressive, impulsive, severely restless, overly excited and hyperactive, or not being able to sleep. If this happens, especially at the beginning of treatment or after a change in dose, call your health care professional.  You may get drowsy or dizzy. Do not drive, use machinery, or do anything that needs mental alertness until you know how this medicine affects you. Do not stand or sit up quickly, especially if you are an older patient. This reduces the risk of dizzy or fainting spells. Alcohol may interfere with the effect of this medicine. Avoid alcoholic drinks.  Your mouth may get dry. Chewing sugarless gum or sucking hard candy, and drinking plenty of water will help. Contact your doctor if the problem does not go away or is severe.  What side effects may I notice from receiving this medicine?  Side effects that you should report to your doctor or health care professional as soon as possible:  -allergic reactions like skin rash, itching or hives, swelling of the face, lips, or tongue  -black, tarry stools  -breathing problems  -changes in vision  -chest pain  -confusion  -eye pain  -fast, irregular heartbeat  -feeling faint or lightheaded, falls  -hallucination, loss of contact with reality  -loss of balance or coordination  -loss of memory  -restlessness, pacing, inability to keep still  -seizures  -stiff muscles  -suicidal thoughts or other mood changes  -trouble sleeping  -unusual bleeding or bruising  -unusually weak or tired  -vomiting  Side effects that  usually do not require medical attention (report to your doctor or health care professional if they continue or are bothersome):  -change in appetite or weight  -change in sex drive or performance  -dizziness  -headache  -increased sweating  -indigestion, nausea  -tremor  This list may not describe all possible side effects. Call your doctor for medical advice about side effects. You may report side effects to FDA at 7-753-GJC-2975.  Where should I keep my medicine?  Keep out of reach of children.  Store at room temperature between 15 and 30 degrees C (59 and 86 degrees F). Throw away any unused medicine after the expiration date.  NOTE: This sheet is a summary. It may not cover all possible information. If you have questions about this medicine, talk to your doctor, pharmacist, or health care provider.     © 2017, [employer]. (2017-02-07 11:53:44)

## 2018-04-12 ENCOUNTER — OFFICE VISIT (OUTPATIENT)
Dept: INTERNAL MEDICINE | Facility: CLINIC | Age: 48
End: 2018-04-12

## 2018-04-12 VITALS
WEIGHT: 195.6 LBS | SYSTOLIC BLOOD PRESSURE: 117 MMHG | HEIGHT: 62 IN | BODY MASS INDEX: 35.99 KG/M2 | DIASTOLIC BLOOD PRESSURE: 70 MMHG | TEMPERATURE: 97.2 F

## 2018-04-12 DIAGNOSIS — K58.9 IRRITABLE BOWEL SYNDROME, UNSPECIFIED TYPE: ICD-10-CM

## 2018-04-12 DIAGNOSIS — F41.9 ANXIETY AND DEPRESSION: Primary | ICD-10-CM

## 2018-04-12 DIAGNOSIS — R10.13 EPIGASTRIC PAIN: ICD-10-CM

## 2018-04-12 DIAGNOSIS — K21.9 GASTROESOPHAGEAL REFLUX DISEASE WITHOUT ESOPHAGITIS: ICD-10-CM

## 2018-04-12 DIAGNOSIS — F32.A ANXIETY AND DEPRESSION: Primary | ICD-10-CM

## 2018-04-12 PROCEDURE — 99214 OFFICE O/P EST MOD 30 MIN: CPT | Performed by: INTERNAL MEDICINE

## 2018-04-12 RX ORDER — MELOXICAM 15 MG/1
15 TABLET ORAL DAILY PRN
Qty: 90 TABLET | Refills: 3 | Status: SHIPPED | OUTPATIENT
Start: 2018-04-12 | End: 2019-05-04 | Stop reason: SDUPTHER

## 2018-04-12 RX ORDER — RANITIDINE 150 MG/1
150 TABLET ORAL DAILY
Qty: 90 TABLET | Refills: 2 | Status: SHIPPED | OUTPATIENT
Start: 2018-04-12 | End: 2019-11-04

## 2018-04-12 RX ORDER — ESTRADIOL 1 MG/1
1 TABLET ORAL DAILY
Qty: 90 TABLET | Refills: 2 | Status: SHIPPED | OUTPATIENT
Start: 2018-04-12 | End: 2018-08-15 | Stop reason: SDUPTHER

## 2018-04-12 RX ORDER — FEXOFENADINE HCL AND PSEUDOEPHEDRINE HCI 180; 240 MG/1; MG/1
1 TABLET, EXTENDED RELEASE ORAL DAILY
Qty: 90 TABLET | Refills: 2 | Status: SHIPPED | OUTPATIENT
Start: 2018-04-12 | End: 2019-11-04

## 2018-04-12 RX ORDER — DICYCLOMINE HYDROCHLORIDE 10 MG/1
10 CAPSULE ORAL 3 TIMES DAILY PRN
Qty: 180 CAPSULE | Refills: 2 | Status: SHIPPED | OUTPATIENT
Start: 2018-04-12 | End: 2018-08-15 | Stop reason: SDUPTHER

## 2018-04-12 RX ORDER — MONTELUKAST SODIUM 10 MG/1
10 TABLET ORAL NIGHTLY
Qty: 90 TABLET | Refills: 2 | Status: SHIPPED | OUTPATIENT
Start: 2018-04-12 | End: 2019-11-04

## 2018-04-12 RX ORDER — CITALOPRAM 40 MG/1
40 TABLET ORAL DAILY
Qty: 90 TABLET | Refills: 2 | Status: SHIPPED | OUTPATIENT
Start: 2018-04-12 | End: 2018-08-15 | Stop reason: SDUPTHER

## 2018-05-01 ENCOUNTER — HOSPITAL ENCOUNTER (OUTPATIENT)
Dept: NUCLEAR MEDICINE | Facility: HOSPITAL | Age: 48
Discharge: HOME | End: 2018-05-01

## 2018-05-01 VITALS — WEIGHT: 192 LBS | BODY MASS INDEX: 35.12 KG/M2

## 2018-05-01 PROCEDURE — A9537 TC99M MEBROFENIN: HCPCS | Performed by: INTERNAL MEDICINE

## 2018-05-01 PROCEDURE — 25010000002 SINCALIDE PER 5 MCG: Performed by: INTERNAL MEDICINE

## 2018-05-01 PROCEDURE — 78227 HEPATOBIL SYST IMAGE W/DRUG: CPT

## 2018-05-01 PROCEDURE — 0 TECHNETIUM TC 99M MEBROFENIN KIT: Performed by: INTERNAL MEDICINE

## 2018-05-01 RX ORDER — KIT FOR THE PREPARATION OF TECHNETIUM TC 99M MEBROFENIN 45 MG/10ML
1 INJECTION, POWDER, LYOPHILIZED, FOR SOLUTION INTRAVENOUS
Status: COMPLETED | OUTPATIENT
Start: 2018-05-01 | End: 2018-05-01

## 2018-05-01 RX ADMIN — MEBROFENIN 1 DOSE: 45 INJECTION, POWDER, LYOPHILIZED, FOR SOLUTION INTRAVENOUS at 13:10

## 2018-05-01 RX ADMIN — SINCALIDE 1.7 MCG: 5 INJECTION, POWDER, LYOPHILIZED, FOR SOLUTION INTRAVENOUS at 14:05

## 2018-08-15 ENCOUNTER — OFFICE VISIT (OUTPATIENT)
Dept: INTERNAL MEDICINE | Facility: CLINIC | Age: 48
End: 2018-08-15

## 2018-08-15 VITALS — BODY MASS INDEX: 33.6 KG/M2 | TEMPERATURE: 97.9 F | WEIGHT: 182.6 LBS | HEIGHT: 62 IN

## 2018-08-15 DIAGNOSIS — F41.9 ANXIETY AND DEPRESSION: ICD-10-CM

## 2018-08-15 DIAGNOSIS — M25.512 ACUTE PAIN OF LEFT SHOULDER: Primary | ICD-10-CM

## 2018-08-15 DIAGNOSIS — S46.102A INJURY OF TENDON OF LONG HEAD OF LEFT BICEPS, INITIAL ENCOUNTER: ICD-10-CM

## 2018-08-15 DIAGNOSIS — F32.A ANXIETY AND DEPRESSION: ICD-10-CM

## 2018-08-15 DIAGNOSIS — K58.9 IRRITABLE BOWEL SYNDROME, UNSPECIFIED TYPE: ICD-10-CM

## 2018-08-15 PROCEDURE — 99214 OFFICE O/P EST MOD 30 MIN: CPT | Performed by: INTERNAL MEDICINE

## 2018-08-15 RX ORDER — ESTRADIOL 1 MG/1
1 TABLET ORAL DAILY
Qty: 90 TABLET | Refills: 2 | Status: SHIPPED | OUTPATIENT
Start: 2018-08-15 | End: 2019-11-02 | Stop reason: SDUPTHER

## 2018-08-15 RX ORDER — CITALOPRAM 40 MG/1
40 TABLET ORAL DAILY
Qty: 90 TABLET | Refills: 2 | Status: SHIPPED | OUTPATIENT
Start: 2018-08-15 | End: 2019-12-17

## 2018-08-15 RX ORDER — DICYCLOMINE HYDROCHLORIDE 10 MG/1
10 CAPSULE ORAL 3 TIMES DAILY PRN
Qty: 180 CAPSULE | Refills: 2 | Status: SHIPPED | OUTPATIENT
Start: 2018-08-15 | End: 2019-11-04

## 2018-08-15 NOTE — PROGRESS NOTES
"Subjective   Nita Mcmahon is a 48 y.o. female here for shoulder pain. It is left shoulder. She had pain in the shoulder for months but she was lifting a 10lb box last few days and as she was lifting/carrying it she heard a loud ripping sound and realized it was her shoulder. She now has swelling in the bicep and pain as well. Pain is mostly in the bicep but radiates up to the shoulder, aching, worse with activity. No loss of sensation or motor function. Has some bruising on the inner arm today.      The following portions of the patient's history were reviewed and updated as appropriate: allergies, current medications, past medical history, past social history, past surgical history and problem list.    Review of Systems:  General: negative  MSK: see hpi  Skin: bruise  Neuro: negative    Objective   Temp 97.9 °F (36.6 °C) (Temporal Artery )   Ht 157.5 cm (62\")   Wt 82.8 kg (182 lb 9.6 oz)   BMI 33.40 kg/m²     Physical Exam   Constitutional: She appears well-developed and well-nourished.   Musculoskeletal:   Left bicep more full compared to the right, ttp. Normal ROM shoulder and no deformity or edema there   Skin:   Small pinpoint bruises on inner left bicep area   Vitals reviewed.      Assessment/Plan   Nita was seen today for shoulder pain.    Diagnoses and all orders for this visit:    Acute pain of left shoulder  -     MRI shoulder left w wo contrast  -     MRI humerus left w wo contrast  -     Concern for bicep tear    Injury of tendon of long head of left biceps, initial encounter  -     MRI shoulder left w wo contrast  -     MRI humerus left w wo contrast    Anxiety and depression  -     citalopram (CELEXA) 40 MG tablet; Take 1 tablet by mouth Daily.    Irritable bowel syndrome, unspecified type  -     dicyclomine (BENTYL) 10 MG capsule; Take 1 capsule by mouth 3 (Three) Times a Day As Needed (abdominal pain or bloating/gas).    Other orders  -     estradiol (ESTRACE) 1 MG tablet; Take 1 tablet by " mouth Daily.

## 2018-08-16 ENCOUNTER — HOSPITAL ENCOUNTER (OUTPATIENT)
Dept: MRI IMAGING | Facility: HOSPITAL | Age: 48
Discharge: HOME | End: 2018-08-16
Admitting: INTERNAL MEDICINE

## 2018-08-16 PROCEDURE — 73220 MRI UPPR EXTREMITY W/O&W/DYE: CPT

## 2018-08-16 PROCEDURE — A9577 INJ MULTIHANCE: HCPCS | Performed by: INTERNAL MEDICINE

## 2018-08-16 PROCEDURE — 0 GADOBENATE DIMEGLUMINE 529 MG/ML SOLUTION: Performed by: INTERNAL MEDICINE

## 2018-08-16 RX ADMIN — GADOBENATE DIMEGLUMINE 15 ML: 529 INJECTION, SOLUTION INTRAVENOUS at 16:10

## 2018-08-17 ENCOUNTER — TELEPHONE (OUTPATIENT)
Dept: INTERNAL MEDICINE | Facility: CLINIC | Age: 48
End: 2018-08-17

## 2018-08-17 DIAGNOSIS — S46.212A BICEPS MUSCLE TEAR, LEFT, INITIAL ENCOUNTER: Primary | ICD-10-CM

## 2018-08-17 NOTE — TELEPHONE ENCOUNTER
----- Message from Mary Dixon MD sent at 8/17/2018 10:55 AM EDT -----  Please call the patient regarding her abnormal result. Tell her she has a complete bicep tear, sending to ortho STAT

## 2019-05-06 RX ORDER — MELOXICAM 15 MG/1
TABLET ORAL
Qty: 90 TABLET | Refills: 3 | Status: SHIPPED | OUTPATIENT
Start: 2019-05-06 | End: 2019-05-06 | Stop reason: SDUPTHER

## 2019-05-06 RX ORDER — MELOXICAM 15 MG/1
15 TABLET ORAL DAILY
Qty: 90 TABLET | Refills: 3 | Status: SHIPPED | OUTPATIENT
Start: 2019-05-06 | End: 2019-11-04 | Stop reason: SDUPTHER

## 2019-11-04 ENCOUNTER — OFFICE VISIT (OUTPATIENT)
Dept: INTERNAL MEDICINE | Facility: CLINIC | Age: 49
End: 2019-11-04

## 2019-11-04 VITALS
SYSTOLIC BLOOD PRESSURE: 128 MMHG | BODY MASS INDEX: 33.59 KG/M2 | DIASTOLIC BLOOD PRESSURE: 72 MMHG | WEIGHT: 189.6 LBS | HEIGHT: 63 IN | TEMPERATURE: 97.2 F

## 2019-11-04 DIAGNOSIS — Z78.0 POSTMENOPAUSAL: ICD-10-CM

## 2019-11-04 DIAGNOSIS — F32.A ANXIETY AND DEPRESSION: Primary | ICD-10-CM

## 2019-11-04 DIAGNOSIS — M19.90 ARTHRITIS: ICD-10-CM

## 2019-11-04 DIAGNOSIS — F41.9 ANXIETY AND DEPRESSION: Primary | ICD-10-CM

## 2019-11-04 PROCEDURE — 99214 OFFICE O/P EST MOD 30 MIN: CPT | Performed by: INTERNAL MEDICINE

## 2019-11-04 RX ORDER — ESTRADIOL 1 MG/1
1 TABLET ORAL DAILY
Qty: 90 TABLET | Refills: 2 | Status: SHIPPED | OUTPATIENT
Start: 2019-11-04 | End: 2020-11-09

## 2019-11-04 RX ORDER — ESTRADIOL 1 MG/1
TABLET ORAL
Qty: 90 TABLET | Refills: 2 | Status: SHIPPED | OUTPATIENT
Start: 2019-11-04 | End: 2019-11-04 | Stop reason: SDUPTHER

## 2019-11-04 RX ORDER — VENLAFAXINE HYDROCHLORIDE 37.5 MG/1
37.5 CAPSULE, EXTENDED RELEASE ORAL DAILY
Qty: 30 CAPSULE | Refills: 2 | Status: SHIPPED | OUTPATIENT
Start: 2019-11-04 | End: 2019-12-17

## 2019-11-04 RX ORDER — MELOXICAM 15 MG/1
15 TABLET ORAL DAILY
Qty: 90 TABLET | Refills: 3 | Status: SHIPPED | OUTPATIENT
Start: 2019-11-04 | End: 2020-08-03 | Stop reason: SDUPTHER

## 2019-11-04 NOTE — PROGRESS NOTES
"Subjective   Nita Mcmahon is a 49 y.o. female here for follow-up anxiety and depression, arthritis, postmenopausal.  Anxiety depression: Does not feel her mood is controlled.  She was initially on Paxil for her mood and although it initially worked well, it eventually did not work anymore.  She was then put on Lexapro and subsequently Wellbutrin as an add-on which proved ineffective as well.  She has now been on Celexa for the last year.  She felt that it did work as well, but now it is not working.  She is having anhedonia, dysphoric mood, fatigue, crying spells, low motivation.  She would like to change her medication at this point.  She says she feels \"depressed\" though she does not really feel like she has a reason.  She has a new granddaughter and feels she should be happy.  She has a stable marriage and a stable job as well.  She denies any new significant stressors in her life.  Arthritis: On Mobic and has done very well on that.  Denies significant arthralgias that are interfering with her life.  Postmenopausal: Had hysterectomy due to fibroids.  She has been on Estrace ever since due to severe hot flashes.  She is interested in potentially coming off of this in the future.    I have reviewed the following portions of the patient's history and confirmed they are accurate: current medications, past medical history, past social history and problem list     I have personally completed the patient's review of systems.    Review of Systems:  General: negative  Neuro: See HPI  Psych: See HPI  MSK: Arthralgia  Endocrine: Negative    Objective   /72 (BP Location: Left arm, Patient Position: Sitting, Cuff Size: Large Adult)   Temp 97.2 °F (36.2 °C) (Temporal)   Ht 158.8 cm (62.5\")   Wt 86 kg (189 lb 9.6 oz)   BMI 34.13 kg/m²     Physical Exam   Constitutional: She is oriented to person, place, and time. She appears well-developed and well-nourished.   HENT:   Head: Normocephalic and atraumatic.   Eyes: " Conjunctivae are normal.   Pulmonary/Chest: Effort normal.   Neurological: She is alert and oriented to person, place, and time. No cranial nerve deficit. She exhibits normal muscle tone.   No tics noted   Skin: Skin is warm and dry.   Psychiatric: She has a normal mood and affect. Her behavior is normal. Judgment and thought content normal.   Vitals reviewed.      Assessment/Plan   Nita was seen today for anxiety and depression.    Diagnoses and all orders for this visit:    Anxiety and depression  -     venlafaxine XR (EFFEXOR XR) 37.5 MG 24 hr capsule; Take 1 capsule by mouth Daily.  -New medication for uncontrolled anxiety and depression.  We will go to 1/2 tablet of Celexa daily until that runs out (about 2 weeks) and concurrently start Effexor at a low dose so that she is at minimal risk for serotonin syndrome.  Hopefully this change in medication will improve some of the side effects that may be due to Celexa.  -Has tried and failed Paxil, Wellbutrin, Lexapro, and now Celexa.  If does not do well on Effexor, will get GeneSight    Arthritis  -     meloxicam (MOBIC) 15 MG tablet; Take 1 tablet by mouth Daily.  -Chronic controlled, continue current medications    Postmenopausal  -     estradiol (ESTRACE) 1 MG tablet; Take 1 tablet by mouth Daily.  -Chronic controlled.  Has been on this since her hysterectomy.  Consider weaning off this in the future.  Discussed with patient             Jasmyne Mcfadden MA

## 2019-12-17 ENCOUNTER — OFFICE VISIT (OUTPATIENT)
Dept: INTERNAL MEDICINE | Facility: CLINIC | Age: 49
End: 2019-12-17

## 2019-12-17 VITALS
HEIGHT: 63 IN | DIASTOLIC BLOOD PRESSURE: 70 MMHG | BODY MASS INDEX: 33.66 KG/M2 | SYSTOLIC BLOOD PRESSURE: 122 MMHG | TEMPERATURE: 97.4 F | WEIGHT: 190 LBS

## 2019-12-17 DIAGNOSIS — F41.9 ANXIETY AND DEPRESSION: Primary | ICD-10-CM

## 2019-12-17 DIAGNOSIS — F32.A ANXIETY AND DEPRESSION: Primary | ICD-10-CM

## 2019-12-17 DIAGNOSIS — Z00.00 HEALTH CARE MAINTENANCE: ICD-10-CM

## 2019-12-17 DIAGNOSIS — Z12.11 COLON CANCER SCREENING: ICD-10-CM

## 2019-12-17 PROCEDURE — 99213 OFFICE O/P EST LOW 20 MIN: CPT | Performed by: INTERNAL MEDICINE

## 2019-12-17 RX ORDER — VENLAFAXINE HYDROCHLORIDE 75 MG/1
75 CAPSULE, EXTENDED RELEASE ORAL DAILY
Qty: 30 CAPSULE | Refills: 5 | Status: SHIPPED | OUTPATIENT
Start: 2019-12-17 | End: 2020-06-29

## 2019-12-17 NOTE — PROGRESS NOTES
"Subjective   Nita Mcmahon is a 49 y.o. female here for follow-up anxiety and depression.  Last visit she was transitioned off Celexa as it was no longer effective and started on Effexor 37.5 mg.  She said she had a pounding headache for about a week when she came off the Celexa but it was not as bad as previously as when she came off other mood medications.  She says she thinks the Effexor is working better.  She still does have some anxiety and some dysphoria and anhedonia so she would like to increase the Effexor.  She denies any negative side effects to Effexor.  She has a 2-month-old grandchild and has really been enjoying that.  She had her Tdap recently.    I have reviewed the following portions of the patient's history and confirmed they are accurate: current medications, past medical history, past social history and problem list     I have personally completed the patient's review of systems.    Review of Systems:  General: negative  Neuro: negative  Psych: See HPI    Objective   /70 (BP Location: Left arm, Patient Position: Sitting, Cuff Size: Large Adult)   Temp 97.4 °F (36.3 °C) (Temporal)   Ht 158.8 cm (62.5\")   Wt 86.2 kg (190 lb)   BMI 34.20 kg/m²     Physical Exam   Constitutional: She is oriented to person, place, and time. She appears well-developed and well-nourished.   Pulmonary/Chest: Effort normal.   Neurological: She is alert and oriented to person, place, and time.   Skin: Skin is warm and dry.   Psychiatric: She has a normal mood and affect. Her behavior is normal. Judgment and thought content normal.   Vitals reviewed.      Assessment/Plan   Nita was seen today for anxiety and depression.    Diagnoses and all orders for this visit:    Anxiety and depression  -     venlafaxine XR (EFFEXOR XR) 75 MG 24 hr capsule; Take 1 capsule by mouth Daily.  -Improving but not quite at goal.  Will increase Effexor to 75 mg from 37.5 mg for hopeful better control of anxiety and " depression    Health care maintenance  -     CBC (No Diff)  -     Comprehensive Metabolic Panel  -     TSH  -     Lipid Panel    Colon cancer screening  -     Ambulatory Referral For Screening Colonoscopy             Jasmyne Mcfadden MA    Please note that portions of this note were completed with a voice recognition program. Efforts were made to edit the dictations, but occasionally words are mistranscribed.

## 2019-12-18 LAB
ALBUMIN SERPL-MCNC: 4.3 G/DL (ref 3.5–5.2)
ALBUMIN/GLOB SERPL: 1.9 G/DL
ALP SERPL-CCNC: 59 U/L (ref 39–117)
ALT SERPL-CCNC: 20 U/L (ref 1–33)
AST SERPL-CCNC: 13 U/L (ref 1–32)
BILIRUB SERPL-MCNC: 0.2 MG/DL (ref 0.2–1.2)
BUN SERPL-MCNC: 13 MG/DL (ref 6–20)
BUN/CREAT SERPL: 15.1 (ref 7–25)
CALCIUM SERPL-MCNC: 8.9 MG/DL (ref 8.6–10.5)
CHLORIDE SERPL-SCNC: 103 MMOL/L (ref 98–107)
CHOLEST SERPL-MCNC: 235 MG/DL (ref 0–200)
CO2 SERPL-SCNC: 29.1 MMOL/L (ref 22–29)
CREAT SERPL-MCNC: 0.86 MG/DL (ref 0.57–1)
ERYTHROCYTE [DISTWIDTH] IN BLOOD BY AUTOMATED COUNT: 12.6 % (ref 12.3–15.4)
GLOBULIN SER CALC-MCNC: 2.3 GM/DL
GLUCOSE SERPL-MCNC: 98 MG/DL (ref 65–99)
HCT VFR BLD AUTO: 42.4 % (ref 34–46.6)
HDLC SERPL-MCNC: 55 MG/DL (ref 40–60)
HGB BLD-MCNC: 13.8 G/DL (ref 12–15.9)
LDLC SERPL CALC-MCNC: 120 MG/DL (ref 0–100)
MCH RBC QN AUTO: 29.9 PG (ref 26.6–33)
MCHC RBC AUTO-ENTMCNC: 32.5 G/DL (ref 31.5–35.7)
MCV RBC AUTO: 91.8 FL (ref 79–97)
PLATELET # BLD AUTO: 362 10*3/MM3 (ref 140–450)
POTASSIUM SERPL-SCNC: 4.4 MMOL/L (ref 3.5–5.2)
PROT SERPL-MCNC: 6.6 G/DL (ref 6–8.5)
RBC # BLD AUTO: 4.62 10*6/MM3 (ref 3.77–5.28)
SODIUM SERPL-SCNC: 142 MMOL/L (ref 136–145)
TRIGL SERPL-MCNC: 302 MG/DL (ref 0–150)
TSH SERPL DL<=0.005 MIU/L-ACNC: 2.88 UIU/ML (ref 0.27–4.2)
VLDLC SERPL CALC-MCNC: 60.4 MG/DL
WBC # BLD AUTO: 6.73 10*3/MM3 (ref 3.4–10.8)

## 2020-01-09 RX ORDER — SODIUM, POTASSIUM,MAG SULFATES 17.5-3.13G
2 SOLUTION, RECONSTITUTED, ORAL ORAL TAKE AS DIRECTED
Qty: 354 ML | Refills: 0 | Status: SHIPPED | OUTPATIENT
Start: 2020-01-09 | End: 2020-08-27

## 2020-01-14 ENCOUNTER — OUTSIDE FACILITY SERVICE (OUTPATIENT)
Dept: GASTROENTEROLOGY | Facility: CLINIC | Age: 50
End: 2020-01-14

## 2020-01-14 PROCEDURE — G0121 COLON CA SCRN NOT HI RSK IND: HCPCS | Performed by: INTERNAL MEDICINE

## 2020-06-27 DIAGNOSIS — F41.9 ANXIETY AND DEPRESSION: ICD-10-CM

## 2020-06-27 DIAGNOSIS — F32.A ANXIETY AND DEPRESSION: ICD-10-CM

## 2020-06-29 RX ORDER — VENLAFAXINE HYDROCHLORIDE 75 MG/1
CAPSULE, EXTENDED RELEASE ORAL
Qty: 30 CAPSULE | Refills: 0 | Status: SHIPPED | OUTPATIENT
Start: 2020-06-29 | End: 2020-08-03 | Stop reason: SDUPTHER

## 2020-08-03 ENCOUNTER — TELEPHONE (OUTPATIENT)
Dept: INTERNAL MEDICINE | Facility: CLINIC | Age: 50
End: 2020-08-03

## 2020-08-03 DIAGNOSIS — F41.9 ANXIETY AND DEPRESSION: ICD-10-CM

## 2020-08-03 DIAGNOSIS — M19.90 ARTHRITIS: ICD-10-CM

## 2020-08-03 DIAGNOSIS — F32.A ANXIETY AND DEPRESSION: ICD-10-CM

## 2020-08-03 RX ORDER — VENLAFAXINE HYDROCHLORIDE 75 MG/1
75 CAPSULE, EXTENDED RELEASE ORAL DAILY
Qty: 30 CAPSULE | Refills: 0 | Status: SHIPPED | OUTPATIENT
Start: 2020-08-03 | End: 2020-08-27 | Stop reason: SDUPTHER

## 2020-08-03 RX ORDER — MELOXICAM 15 MG/1
15 TABLET ORAL DAILY
Qty: 90 TABLET | Refills: 0 | Status: SHIPPED | OUTPATIENT
Start: 2020-08-03 | End: 2020-11-03 | Stop reason: SDUPTHER

## 2020-08-03 NOTE — TELEPHONE ENCOUNTER
PLEASE CALL IN HER EFFEXOR-XR 75MG #30 2. MOBIC 15MG  MAKINF HER A APPT. PLEASE CALL IN TO [facility] ON [address] [phone]

## 2020-08-27 ENCOUNTER — OFFICE VISIT (OUTPATIENT)
Dept: INTERNAL MEDICINE | Facility: CLINIC | Age: 50
End: 2020-08-27

## 2020-08-27 VITALS
HEIGHT: 63 IN | TEMPERATURE: 97.1 F | DIASTOLIC BLOOD PRESSURE: 70 MMHG | BODY MASS INDEX: 34.2 KG/M2 | SYSTOLIC BLOOD PRESSURE: 130 MMHG | WEIGHT: 193 LBS

## 2020-08-27 DIAGNOSIS — M25.562 ACUTE PAIN OF LEFT KNEE: ICD-10-CM

## 2020-08-27 DIAGNOSIS — F32.A ANXIETY AND DEPRESSION: Primary | ICD-10-CM

## 2020-08-27 DIAGNOSIS — Z11.59 ENCOUNTER FOR HEPATITIS C SCREENING TEST FOR LOW RISK PATIENT: ICD-10-CM

## 2020-08-27 DIAGNOSIS — Z00.00 HEALTH CARE MAINTENANCE: ICD-10-CM

## 2020-08-27 DIAGNOSIS — F41.9 ANXIETY AND DEPRESSION: Primary | ICD-10-CM

## 2020-08-27 PROCEDURE — 99213 OFFICE O/P EST LOW 20 MIN: CPT | Performed by: INTERNAL MEDICINE

## 2020-08-27 RX ORDER — VENLAFAXINE HYDROCHLORIDE 75 MG/1
75 CAPSULE, EXTENDED RELEASE ORAL DAILY
Qty: 90 CAPSULE | Refills: 3 | Status: SHIPPED | OUTPATIENT
Start: 2020-08-27 | End: 2021-09-13 | Stop reason: SDUPTHER

## 2020-08-27 NOTE — PROGRESS NOTES
"Subjective   Nita Mcmahon is a 50 y.o. female here for follow-up  A&D and new worsening knee pain. A&D: mood is stable and has been long term. Feels she is in a good place regarding mood. Working from home, trying to keep busy during pandemic. wants refill of effexor. Concerned about knee pain which is new. No injury. Some mild swelling on the lateral side, feels tender when going down stairs or squatting. She would like to see ortho.     I have reviewed the following portions of the patient's history and confirmed they are accurate: current medications, past medical history, past social history and problem list     I have personally completed the patient's review of systems.    Review of Systems:  General: negative  CV: negative  Respiratory: negative  Neuro: negative  Psych: negative    Objective   /70 (BP Location: Left arm, Patient Position: Sitting, Cuff Size: Large Adult)   Temp 97.1 °F (36.2 °C) (Temporal)   Ht 158.8 cm (62.5\")   Wt 87.5 kg (193 lb)   BMI 34.74 kg/m²     Physical Exam   Constitutional: She is oriented to person, place, and time. She appears well-developed and well-nourished.   Pulmonary/Chest: Effort normal.   Musculoskeletal:        Left knee: She exhibits normal range of motion, no swelling, no effusion, no deformity and no erythema. No tenderness found.   Neurological: She is alert and oriented to person, place, and time.   Skin: Skin is warm and dry.   Psychiatric: She has a normal mood and affect. Her behavior is normal. Judgment and thought content normal.   Vitals reviewed.      Assessment/Plan   Nita was seen today for anxiety and depression.    Diagnoses and all orders for this visit:    Anxiety and depression  -     venlafaxine XR (EFFEXOR-XR) 75 MG 24 hr capsule; Take 1 capsule by mouth Daily.  -stable, continue current meds    Acute pain of left knee  -     Ambulatory Referral to Orthopedic Surgery  -worsening    Encounter for hepatitis C screening test for low " risk patient  -     Cancel: Hepatitis C Antibody; Future    Health care maintenance  -     CBC (No Diff); Future  -     Cancel: Comprehensive Metabolic Panel; Future  -     Cancel: Lipid Panel; Future             Jasmyne Mcfadden MA    Please note that portions of this note were completed with a voice recognition program. Efforts were made to edit the dictations, but occasionally words are mistranscribed.

## 2020-08-28 ENCOUNTER — LAB REQUISITION (OUTPATIENT)
Dept: LAB | Facility: HOSPITAL | Age: 50
End: 2020-08-28

## 2020-08-28 ENCOUNTER — LAB (OUTPATIENT)
Dept: LAB | Facility: HOSPITAL | Age: 50
End: 2020-08-28

## 2020-08-28 DIAGNOSIS — Z00.00 HEALTHCARE MAINTENANCE: ICD-10-CM

## 2020-08-28 DIAGNOSIS — Z00.00 ROUTINE GENERAL MEDICAL EXAMINATION AT A HEALTH CARE FACILITY: ICD-10-CM

## 2020-08-28 DIAGNOSIS — Z11.59 ENCOUNTER FOR HEPATITIS C SCREENING TEST FOR LOW RISK PATIENT: Primary | ICD-10-CM

## 2020-08-28 PROCEDURE — 36415 COLL VENOUS BLD VENIPUNCTURE: CPT | Performed by: INTERNAL MEDICINE

## 2020-09-10 ENCOUNTER — OFFICE VISIT (OUTPATIENT)
Dept: ORTHOPEDIC SURGERY | Facility: CLINIC | Age: 50
End: 2020-09-10

## 2020-09-10 VITALS — OXYGEN SATURATION: 98 % | HEART RATE: 101 BPM | BODY MASS INDEX: 34.18 KG/M2 | HEIGHT: 63 IN | WEIGHT: 192.9 LBS

## 2020-09-10 DIAGNOSIS — M17.12 PRIMARY OSTEOARTHRITIS OF LEFT KNEE: Primary | ICD-10-CM

## 2020-09-10 DIAGNOSIS — M25.562 ACUTE PAIN OF LEFT KNEE: ICD-10-CM

## 2020-09-10 PROCEDURE — 20610 DRAIN/INJ JOINT/BURSA W/O US: CPT | Performed by: ORTHOPAEDIC SURGERY

## 2020-09-10 PROCEDURE — 99244 OFF/OP CNSLTJ NEW/EST MOD 40: CPT | Performed by: ORTHOPAEDIC SURGERY

## 2020-09-10 RX ORDER — TRIAMCINOLONE ACETONIDE 40 MG/ML
80 INJECTION, SUSPENSION INTRA-ARTICULAR; INTRAMUSCULAR
Status: COMPLETED | OUTPATIENT
Start: 2020-09-10 | End: 2020-09-10

## 2020-09-10 RX ORDER — BUPIVACAINE HYDROCHLORIDE 2.5 MG/ML
3 INJECTION, SOLUTION EPIDURAL; INFILTRATION; INTRACAUDAL
Status: COMPLETED | OUTPATIENT
Start: 2020-09-10 | End: 2020-09-10

## 2020-09-10 RX ORDER — LIDOCAINE HYDROCHLORIDE 10 MG/ML
3 INJECTION, SOLUTION EPIDURAL; INFILTRATION; INTRACAUDAL; PERINEURAL
Status: COMPLETED | OUTPATIENT
Start: 2020-09-10 | End: 2020-09-10

## 2020-09-10 RX ADMIN — BUPIVACAINE HYDROCHLORIDE 3 ML: 2.5 INJECTION, SOLUTION EPIDURAL; INFILTRATION; INTRACAUDAL at 08:18

## 2020-09-10 RX ADMIN — LIDOCAINE HYDROCHLORIDE 3 ML: 10 INJECTION, SOLUTION EPIDURAL; INFILTRATION; INTRACAUDAL; PERINEURAL at 08:18

## 2020-09-10 RX ADMIN — TRIAMCINOLONE ACETONIDE 80 MG: 40 INJECTION, SUSPENSION INTRA-ARTICULAR; INTRAMUSCULAR at 08:18

## 2020-09-10 NOTE — PROGRESS NOTES
Orthopaedic Clinic Note: Knee New Patient    Chief Complaint   Patient presents with   • Left Knee - Pain        HPI  Consult from: Mary Figueroa*    Nita Mcmahon is a 50 y.o. female who presents with left knee pain for 2 month(s). Onset atraumatic and gradual in nature. Pain is localized to the medial joint line, anterior knee and is a 7/10 on the pain scale. Pain is described as dull, aching and throbbing. Associated symptoms include pain. The pain is worse with walking, standing, sitting, climbing stairs, sleeping, working and leisure; nothing helps make it better. Previous treatments have included: bracing and NSAIDs since symptom onset. Although some transient relief was reported with these interventions, these conservative measures have failed and symptoms have persisted. The patient is limited in daily activities and has had a significant decrease in quality of life as a result. She denies fevers, chills, or constitutional symptoms.    I have reviewed the following portions of the patient's history:History of Present Illness    Past Medical History:   Diagnosis Date   • History of esophageal reflux    • Muscle spasm of back       Past Surgical History:   Procedure Laterality Date   • APPENDECTOMY     •  SECTION     • HERNIA REPAIR     • HYSTERECTOMY        Family History   Problem Relation Age of Onset   • Arthritis Other    • Diabetes Other    • Hyperlipidemia Other    • Obesity Other    • Mental illness Other    • Heart attack Other      Social History     Socioeconomic History   • Marital status:      Spouse name: Not on file   • Number of children: Not on file   • Years of education: Not on file   • Highest education level: Not on file   Tobacco Use   • Smoking status: Never Smoker   • Smokeless tobacco: Never Used   Substance and Sexual Activity   • Alcohol use: No   • Drug use: No   • Sexual activity: Yes     Partners: Male      Current Outpatient Medications on File Prior  "to Visit   Medication Sig Dispense Refill   • albuterol (PROAIR HFA) 108 (90 BASE) MCG/ACT inhaler ProAir  (90 Base) MCG/ACT Inhalation Aerosol Solution; Patient Sig: ProAir  (90 Base) MCG/ACT Inhalation Aerosol Solution INHALE 1 TO 2 PUFFS EVERY 4 TO 6 HOURS AS NEEDED.; 1; 1; 28-Jan-2016; Active     • estradiol (ESTRACE) 1 MG tablet Take 1 tablet by mouth Daily. 90 tablet 2   • meloxicam (MOBIC) 15 MG tablet Take 1 tablet by mouth Daily. 90 tablet 0   • venlafaxine XR (EFFEXOR-XR) 75 MG 24 hr capsule Take 1 capsule by mouth Daily. 90 capsule 3     No current facility-administered medications on file prior to visit.       Allergies   Allergen Reactions   • Avelox  [Moxifloxacin Hcl] Anaphylaxis   • Avelox [Moxifloxacin]    • Morphine Itching        Review of Systems   Constitutional: Negative.    HENT: Negative.    Eyes: Negative.    Respiratory: Negative.    Cardiovascular: Negative.    Gastrointestinal: Negative.    Endocrine: Negative.    Genitourinary: Negative.    Musculoskeletal: Positive for arthralgias and joint swelling.   Skin: Negative.    Allergic/Immunologic: Negative.    Neurological: Negative.    Hematological: Negative.    Psychiatric/Behavioral: Negative.         The patient's Review of Systems was personally reviewed and confirmed as accurate.    The following portions of the patient's history were reviewed and updated as appropriate: allergies, current medications, past family history, past medical history, past social history, past surgical history and problem list.    Physical Exam  Pulse 101, height 158.8 cm (62.52\"), weight 87.5 kg (192 lb 14.4 oz), SpO2 98 %.    Body mass index is 34.7 kg/m².    GENERAL APPEARANCE: awake, alert & oriented x 3, in no acute distress and well developed, well nourished  PSYCH: normal affect  LUNGS:  breathing nonlabored  EYES: sclera anicteric  CARDIOVASCULAR: palpable dorsalis pedis, palpable posterior tibial bilaterally. Capillary refill less than " 2 seconds  EXTREMITIES: no clubbing, cyanosis  GAIT:  Antalgic            Right Lower Extremity Exam:   ----------  Hip Exam  ----------  FLEXION CONTRACTURE: None  FLEXION: 110 degrees  INTERNAL ROTATION: 20 degrees at 90 degrees of flexion   EXTERNAL ROTATION: 40 degrees at 90 degrees of flexion    PAIN WITH HIP MOTION: no  ----------  Knee Exam  ----------  ALIGNMENT: neutral, no varus or valgus deformity     RANGE OF MOTION:  Normal (0-120 degrees) with no extensor lag or flexion contracture  LIGAMENTOUS STABILITY:   stable to varus and valgus stress at 0 and 30 degrees without any evidence of laxity     STRENGTH:  5/5 knee flexion, extension. 5/5 ankle dorsiflexion and plantarflexion.     PAIN WITH PALPATION: denies tenderness to palpation about the knee, denies medial or lateral joint line pain  KNEE EFFUSION: no  PAIN WITH KNEE ROM: no  PATELLAR CREPITUS: yes, asymptomatic  SPECIAL EXAM FINDINGS:  Negative patellar compression    REFLEXES:  PATELLAR 2+/4  ACHILLES 2+/4    CLONUS: negative  STRAIGHT LEG TEST:   negative    SENSATION TO LIGHT TOUCH:  DEEP PERONEAL/SUPERFICIAL PERONEAL/SURAL/SAPHENOUS/TIBIAL:   intact    EDEMA:  no  ERYTHEMA:  no  WOUNDS/INCISIONS: no overlying skin problems.      Left Lower Extremity Exam:   ----------  Hip Exam  ----------  FLEXION CONTRACTURE: None  FLEXION: 110 degrees  INTERNAL ROTATION: 20 degrees at 90 degrees of flexion   EXTERNAL ROTATION: 40 degrees at 90 degrees of flexion    PAIN WITH HIP MOTION: no  ----------  Knee Exam  ----------  ALIGNMENT: mild varus, correctible to neutral    RANGE OF MOTION:  Normal (0-120 degrees) with no extensor lag or flexion contracture  LIGAMENTOUS STABILITY:   stable to varus and valgus stress at terminal extension and 30 degrees; retensioning of the MCL is appreciated with valgus stress at 30 degrees consistent with medial compartment degeneration     STRENGTH:  5/5 knee flexion, extension. 5/5 ankle dorsiflexion and plantarflexion.      PAIN WITH PALPATION: medial joint line and anterior knee  KNEE EFFUSION: yes, trace effusion  PAIN WITH KNEE ROM: yes, anteriorly  PATELLAR CREPITUS: yes, painful and symptomatic  SPECIAL EXAM FINDINGS:  none    REFLEXES:  PATELLAR 2+/4  ACHILLES 2+/4    CLONUS: no  STRAIGHT LEG TEST:   negative    SENSATION TO LIGHT TOUCH:  DEEP PERONEAL/SUPERFICIAL PERONEAL/SURAL/SAPHENOUS/TIBIAL:   intact    EDEMA:  no  ERYTHEMA:  no  WOUNDS/INCISIONS:  no      ______________________________________________________________________  ______________________________________________________________________    RADIOGRAPHIC FINDINGS:   Indication: Left knee pain    Comparison: No prior xrays are available for comparison    Left knee(s) 4 views: mild tricompartmental osteoarthritis with genu varum alignment.  Small periarticular osteophytes visualized in all compartments.  No acute bony injury or fracture.      Assessment/Plan:   Diagnosis Plan   1. Primary osteoarthritis of left knee  Large Joint Arthrocentesis: L knee   2. Acute pain of left knee  XR Knee 4+ View Left     Patient's knee pain is due to arthritic exacerbation.  She has failed oral anti-inflammatories.  I recommend proceeding to cortisone injection left knee.  She is agreeable to this.  Follow-up as needed.    Procedure Note:  I discussed with the patient the potential benefits of performing a therapeutic injection of the left knee as well as potential risks including but not limited to infection, swelling, pain, bleeding, bruising, nerve/vessel damage, skin color changes, transient elevation in blood glucose levels, and fat atrophy. After informed consent and verifying correct patient, procedure site, and type of procedure, the area was prepped with alcohol, ethyl chloride was used to numb the skin. Via the superolateral approach, 3cc of 1% lidocaine, 3cc of 0.25% bupivicaine and 2 cc of 40mg/ml of Kenalog were injected into the left knee. The patient tolerated the  procedure well. There were no complications. A sterile dressing was placed over the injection site.      Saul Rangel MD  09/10/20  08:27

## 2020-09-10 NOTE — PROGRESS NOTES
Procedure   Large Joint Arthrocentesis: L knee  Date/Time: 9/10/2020 8:18 AM  Consent given by: patient  Site marked: site marked  Timeout: Immediately prior to procedure a time out was called to verify the correct patient, procedure, equipment, support staff and site/side marked as required   Supporting Documentation  Indications: pain   Procedure Details  Location: knee - L knee  Preparation: Patient was prepped and draped in the usual sterile fashion  Needle size: 22 G  Approach: anterolateral  Medications administered: 3 mL bupivacaine (PF) 0.25 %; 3 mL lidocaine PF 1% 1 %; 80 mg triamcinolone acetonide 40 MG/ML  Patient tolerance: patient tolerated the procedure well with no immediate complications

## 2020-10-19 ENCOUNTER — TELEPHONE (OUTPATIENT)
Dept: INTERNAL MEDICINE | Facility: CLINIC | Age: 50
End: 2020-10-19

## 2020-10-19 NOTE — TELEPHONE ENCOUNTER
Caller: Nita Mcmahon    Relationship: Self    Best call back number: [phone]     Caller requesting test results: SELF    What test was performed: LABS    When was the test performed: 8/28    Where was the test performed: [facility]    Additional notes: PT STATED THAT SHE DIDN'T GET HER RESULTS.

## 2020-10-23 ENCOUNTER — TELEPHONE (OUTPATIENT)
Dept: INTERNAL MEDICINE | Facility: CLINIC | Age: 50
End: 2020-10-23

## 2020-10-23 DIAGNOSIS — K58.9 IRRITABLE BOWEL SYNDROME, UNSPECIFIED TYPE: ICD-10-CM

## 2020-10-23 DIAGNOSIS — E87.5 HYPERKALEMIA: Primary | ICD-10-CM

## 2020-10-23 NOTE — TELEPHONE ENCOUNTER
----- Message from Mary Dixon MD sent at 10/23/2020 10:33 AM EDT -----  Tell her sorry we got her lab report so late, but I reviewed it and cholesterol was a touch high but not enough for me to want to put her on medication.  I simply want her to watch her diet.  They did not do a CBC as I had ordered.  I am going to order that as well as a repeat BMP because her potassium was a bit elevated at this time.  She can stop by anytime for that, does not have to be fasting.

## 2020-11-03 ENCOUNTER — TELEPHONE (OUTPATIENT)
Dept: INTERNAL MEDICINE | Facility: CLINIC | Age: 50
End: 2020-11-03

## 2020-11-03 DIAGNOSIS — M19.90 ARTHRITIS: ICD-10-CM

## 2020-11-03 RX ORDER — MELOXICAM 15 MG/1
15 TABLET ORAL DAILY
Qty: 90 TABLET | Refills: 3 | Status: SHIPPED | OUTPATIENT
Start: 2020-11-03 | End: 2021-11-11

## 2020-11-03 NOTE — TELEPHONE ENCOUNTER
PATIENT IS NEEDING A REFILL FOR MELOXICAM 15MG TABS SENT INTO [facility] [address] FAX NUMBER [phone].

## 2020-11-09 RX ORDER — ESTRADIOL 1 MG/1
TABLET ORAL
Qty: 90 TABLET | Refills: 2 | Status: SHIPPED | OUTPATIENT
Start: 2020-11-09 | End: 2021-08-03

## 2020-11-13 ENCOUNTER — TELEPHONE (OUTPATIENT)
Dept: INTERNAL MEDICINE | Facility: CLINIC | Age: 50
End: 2020-11-13

## 2020-11-13 NOTE — TELEPHONE ENCOUNTER
PATIENT CALLED IN STATED SHE HAS BEEN SEEING A CHIROPRACTOR FOR SEVERAL YEARS YET THE PATIENT IS STILL COMPLAINING OF LOW BACK PAIN AND PAIN IN HER SCIATIC SHE IS REQUESTING A CALLBACK TO DISCUSS WHAT HER OPTIONS MAY BE MOVING FORWARD PLEASE ADVISE    PATIENT CALL BACK: [phone]

## 2020-11-17 ENCOUNTER — TELEPHONE (OUTPATIENT)
Dept: INTERNAL MEDICINE | Facility: CLINIC | Age: 50
End: 2020-11-17

## 2020-11-17 NOTE — TELEPHONE ENCOUNTER
DELETE AFTER REVIEWING: Telephone encounter to be sent to the clinical pool     Caller: Nita Mcmahon    Relationship: Self    Best call back number: [phone]    What medication are you requesting: STEROID    What are your current symptoms: SEVERE BACK PAIN    How long have you been experiencing symptoms:FOR THE LAST COUPLE OF WEEKS    Have you had these symptoms before:    [x] Yes  [] No    Have you been treated for these symptoms before:   [x] Yes  [] No    If a prescription is needed, what is your preferred pharmacy and phone number: [id number] - [place], [place] - [address] - [phone]  - [phone] FX     Additional notes:

## 2020-11-18 RX ORDER — METHYLPREDNISOLONE 4 MG/1
TABLET ORAL
Qty: 21 EACH | Refills: 0 | Status: SHIPPED | OUTPATIENT
Start: 2020-11-18 | End: 2021-01-27

## 2020-11-20 ENCOUNTER — OFFICE VISIT (OUTPATIENT)
Dept: INTERNAL MEDICINE | Facility: CLINIC | Age: 50
End: 2020-11-20

## 2020-11-20 ENCOUNTER — LAB REQUISITION (OUTPATIENT)
Dept: LAB | Facility: HOSPITAL | Age: 50
End: 2020-11-20

## 2020-11-20 VITALS
HEIGHT: 63 IN | DIASTOLIC BLOOD PRESSURE: 78 MMHG | SYSTOLIC BLOOD PRESSURE: 120 MMHG | BODY MASS INDEX: 33.98 KG/M2 | WEIGHT: 191.8 LBS | TEMPERATURE: 96.9 F

## 2020-11-20 DIAGNOSIS — G89.29 CHRONIC MIDLINE LOW BACK PAIN WITH BILATERAL SCIATICA: Primary | ICD-10-CM

## 2020-11-20 DIAGNOSIS — M54.41 CHRONIC MIDLINE LOW BACK PAIN WITH BILATERAL SCIATICA: Primary | ICD-10-CM

## 2020-11-20 DIAGNOSIS — M62.838 MUSCLE SPASM: ICD-10-CM

## 2020-11-20 DIAGNOSIS — Z23 NEED FOR INFLUENZA VACCINATION: ICD-10-CM

## 2020-11-20 DIAGNOSIS — E87.5 HYPERKALEMIA: ICD-10-CM

## 2020-11-20 DIAGNOSIS — M54.42 CHRONIC MIDLINE LOW BACK PAIN WITH BILATERAL SCIATICA: Primary | ICD-10-CM

## 2020-11-20 DIAGNOSIS — K58.9 IRRITABLE BOWEL SYNDROME, UNSPECIFIED TYPE: ICD-10-CM

## 2020-11-20 PROCEDURE — 90686 IIV4 VACC NO PRSV 0.5 ML IM: CPT | Performed by: INTERNAL MEDICINE

## 2020-11-20 PROCEDURE — 90471 IMMUNIZATION ADMIN: CPT | Performed by: INTERNAL MEDICINE

## 2020-11-20 PROCEDURE — 36415 COLL VENOUS BLD VENIPUNCTURE: CPT | Performed by: INTERNAL MEDICINE

## 2020-11-20 PROCEDURE — 99214 OFFICE O/P EST MOD 30 MIN: CPT | Performed by: INTERNAL MEDICINE

## 2020-11-20 RX ORDER — CYCLOBENZAPRINE HCL 10 MG
10 TABLET ORAL 3 TIMES DAILY PRN
Qty: 21 TABLET | Refills: 0 | Status: SHIPPED | OUTPATIENT
Start: 2020-11-20 | End: 2021-08-04

## 2020-11-20 NOTE — PROGRESS NOTES
"Subjective   Nita Mcmahon is a 50 y.o. female here for worsening low back pain. Has had it a number of years, but becoming unbearable. If she stands more than a few minutes there is pain in the midline lumbar area. If she stands a bit longer then she gets shooting pain into b/l buttocks and down the thigh a bit. She has trouble going up stairs as well. Has a sharp shooting pain when it occurs. Happening more often. She notices it if she is standing doing dishes. She has been seeing chiro for awhile and had abnormal XR spine. She isn't seeing any improvement in pain with chiro. Would like to proceed with more imaging. She is on mobic which she thinks helps, but is having muscle spasms in the low back.    I have reviewed the following portions of the patient's history and confirmed they are accurate: current medications, past medical history, past social history, past surgical history and problem list     I have personally completed the patient's review of systems.    Review of Systems:  General: negative  Neuro: sciatica  MSK: low back pain    Objective   /78   Temp 96.9 °F (36.1 °C) (Temporal)   Ht 158.8 cm (62.5\")   Wt 87 kg (191 lb 12.8 oz)   BMI 34.52 kg/m²     Physical Exam  Vitals signs reviewed.   Constitutional:       Appearance: She is well-developed.   Pulmonary:      Effort: Pulmonary effort is normal.   Musculoskeletal:      Lumbar back: She exhibits normal range of motion, no tenderness, no bony tenderness, no swelling and no deformity.   Skin:     General: Skin is warm and dry.   Neurological:      Mental Status: She is alert and oriented to person, place, and time.   Psychiatric:         Behavior: Behavior normal.         Thought Content: Thought content normal.         Judgment: Judgment normal.         Assessment/Plan   Diagnoses and all orders for this visit:    1. Chronic midline low back pain with bilateral sciatica (Primary)  -     MRI Lumbar Spine Without Contrast  -chronic " worsening, proceed with MRI. Had XR via chiropractic    2. Muscle spasm  -     cyclobenzaprine (FLEXERIL) 10 MG tablet; Take 1 tablet by mouth 3 (Three) Times a Day As Needed for Muscle Spasms.  Dispense: 21 tablet; Refill: 0  -new med, discussed possible drowsiness SE    3. Irritable bowel syndrome, unspecified type  -     CBC (No Diff)    4. Hyperkalemia  -     Basic Metabolic Panel    5. Need for influenza vaccination  -     FluLaval Quad >6 Months ([id number])             Jasmyne Mcfadden MA    Please note that portions of this note were completed with a voice recognition program. Efforts were made to edit the dictations, but occasionally words are mistranscribed.

## 2020-11-21 LAB
BUN SERPL-MCNC: 16 MG/DL (ref 6–20)
BUN/CREAT SERPL: 26.2 (ref 7–25)
CALCIUM SERPL-MCNC: 9.3 MG/DL (ref 8.6–10.5)
CHLORIDE SERPL-SCNC: 98 MMOL/L (ref 98–107)
CO2 SERPL-SCNC: 30.7 MMOL/L (ref 22–29)
CREAT SERPL-MCNC: 0.61 MG/DL (ref 0.57–1)
ERYTHROCYTE [DISTWIDTH] IN BLOOD BY AUTOMATED COUNT: 12.3 % (ref 12.3–15.4)
GLUCOSE SERPL-MCNC: 94 MG/DL (ref 65–99)
HCT VFR BLD AUTO: 41.2 % (ref 34–46.6)
HGB BLD-MCNC: 13.7 G/DL (ref 12–15.9)
MCH RBC QN AUTO: 30.6 PG (ref 26.6–33)
MCHC RBC AUTO-ENTMCNC: 33.3 G/DL (ref 31.5–35.7)
MCV RBC AUTO: 92.2 FL (ref 79–97)
PLATELET # BLD AUTO: 377 10*3/MM3 (ref 140–450)
POTASSIUM SERPL-SCNC: 4.4 MMOL/L (ref 3.5–5.2)
RBC # BLD AUTO: 4.47 10*6/MM3 (ref 3.77–5.28)
SODIUM SERPL-SCNC: 136 MMOL/L (ref 136–145)
WBC # BLD AUTO: 11.41 10*3/MM3 (ref 3.4–10.8)

## 2020-12-08 ENCOUNTER — APPOINTMENT (OUTPATIENT)
Dept: MRI IMAGING | Facility: HOSPITAL | Age: 50
End: 2020-12-08

## 2020-12-15 ENCOUNTER — OFFICE VISIT (OUTPATIENT)
Dept: ORTHOPEDIC SURGERY | Facility: CLINIC | Age: 50
End: 2020-12-15

## 2020-12-15 VITALS — WEIGHT: 191 LBS | HEIGHT: 63 IN | OXYGEN SATURATION: 99 % | HEART RATE: 113 BPM | BODY MASS INDEX: 33.84 KG/M2

## 2020-12-15 DIAGNOSIS — M17.12 PRIMARY OSTEOARTHRITIS OF LEFT KNEE: Primary | ICD-10-CM

## 2020-12-15 DIAGNOSIS — E66.9 OBESITY (BMI 30-39.9): ICD-10-CM

## 2020-12-15 PROCEDURE — 99213 OFFICE O/P EST LOW 20 MIN: CPT | Performed by: ORTHOPAEDIC SURGERY

## 2020-12-15 PROCEDURE — 20610 DRAIN/INJ JOINT/BURSA W/O US: CPT | Performed by: ORTHOPAEDIC SURGERY

## 2020-12-15 RX ORDER — TRIAMCINOLONE ACETONIDE 40 MG/ML
80 INJECTION, SUSPENSION INTRA-ARTICULAR; INTRAMUSCULAR
Status: COMPLETED | OUTPATIENT
Start: 2020-12-15 | End: 2020-12-15

## 2020-12-15 RX ORDER — LIDOCAINE HYDROCHLORIDE 10 MG/ML
3 INJECTION, SOLUTION EPIDURAL; INFILTRATION; INTRACAUDAL; PERINEURAL
Status: COMPLETED | OUTPATIENT
Start: 2020-12-15 | End: 2020-12-15

## 2020-12-15 RX ORDER — BUPIVACAINE HYDROCHLORIDE 2.5 MG/ML
3 INJECTION, SOLUTION EPIDURAL; INFILTRATION; INTRACAUDAL
Status: COMPLETED | OUTPATIENT
Start: 2020-12-15 | End: 2020-12-15

## 2020-12-15 RX ADMIN — LIDOCAINE HYDROCHLORIDE 3 ML: 10 INJECTION, SOLUTION EPIDURAL; INFILTRATION; INTRACAUDAL; PERINEURAL at 12:59

## 2020-12-15 RX ADMIN — BUPIVACAINE HYDROCHLORIDE 3 ML: 2.5 INJECTION, SOLUTION EPIDURAL; INFILTRATION; INTRACAUDAL at 12:59

## 2020-12-15 RX ADMIN — TRIAMCINOLONE ACETONIDE 80 MG: 40 INJECTION, SUSPENSION INTRA-ARTICULAR; INTRAMUSCULAR at 12:59

## 2020-12-15 NOTE — PROGRESS NOTES
Orthopaedic Clinic Note: Knee Established Patient    Chief Complaint   Patient presents with   • Follow-up     Left Knee Pain        HPI    It has been 3  month(s) since Ms. Mcmahon's last visit. She returns to clinic today for follow-up left knee arthritis.  She underwent cortisone injection left knee 3 months ago.  The injection provided few weeks of relief for pain returned.  She comes clinic today rating her pain 5/10 on the pain scale.  She is ambulating with no assistive device.  She denies fevers chills or constitutional symptoms.  Overall she is doing about the same.    Past Medical History:   Diagnosis Date   • History of esophageal reflux    • Muscle spasm of back       Past Surgical History:   Procedure Laterality Date   • APPENDECTOMY     •  SECTION     • HERNIA REPAIR     • HYSTERECTOMY        Family History   Problem Relation Age of Onset   • Arthritis Other    • Diabetes Other    • Hyperlipidemia Other    • Obesity Other    • Mental illness Other    • Heart attack Other      Social History     Socioeconomic History   • Marital status:      Spouse name: Not on file   • Number of children: Not on file   • Years of education: Not on file   • Highest education level: Not on file   Tobacco Use   • Smoking status: Never Smoker   • Smokeless tobacco: Never Used   Substance and Sexual Activity   • Alcohol use: No   • Drug use: No   • Sexual activity: Yes     Partners: Male      Current Outpatient Medications on File Prior to Visit   Medication Sig Dispense Refill   • albuterol (PROAIR HFA) 108 (90 BASE) MCG/ACT inhaler ProAir  (90 Base) MCG/ACT Inhalation Aerosol Solution; Patient Sig: ProAir  (90 Base) MCG/ACT Inhalation Aerosol Solution INHALE 1 TO 2 PUFFS EVERY 4 TO 6 HOURS AS NEEDED.; 1; ; -2016; Active     • cyclobenzaprine (FLEXERIL) 10 MG tablet Take 1 tablet by mouth 3 (Three) Times a Day As Needed for Muscle Spasms. 21 tablet 0   • estradiol (ESTRACE) 1 MG tablet  "TAKE 1 TABLET BY MOUTH EVERY DAY 90 tablet 2   • meloxicam (MOBIC) 15 MG tablet Take 1 tablet by mouth Daily. 90 tablet 3   • methylPREDNISolone (MEDROL) 4 MG dose pack Take as directed on package instructions. 21 each 0   • venlafaxine XR (EFFEXOR-XR) 75 MG 24 hr capsule Take 1 capsule by mouth Daily. 90 capsule 3     No current facility-administered medications on file prior to visit.       Allergies   Allergen Reactions   • Avelox  [Moxifloxacin Hcl] Anaphylaxis   • Avelox [Moxifloxacin]    • Morphine Itching        Review of Systems   Constitutional: Negative.    HENT: Negative.    Eyes: Negative.    Respiratory: Negative.    Cardiovascular: Negative.    Gastrointestinal: Negative.    Endocrine: Negative.    Genitourinary: Negative.    Musculoskeletal: Positive for arthralgias.   Skin: Negative.    Allergic/Immunologic: Negative.    Neurological: Negative.    Hematological: Negative.    Psychiatric/Behavioral: Negative.         The patient's Review of Systems was personally reviewed and confirmed as accurate.    Physical Exam  Pulse 113, height 158.8 cm (62.5\"), weight 86.6 kg (191 lb), SpO2 99 %.    Body mass index is 34.38 kg/m².    GENERAL APPEARANCE: awake, alert, oriented, in no acute distress and well developed, well nourished  LUNGS:  breathing nonlabored  EXTREMITIES: no clubbing, cyanosis  PERIPHERAL PULSES: palpable dorsalis pedis and posterior tibial pulses bilaterally.    GAIT:  Antalgic        ----------  Left Knee Exam:  ----------  ALIGNMENT: mild varus, correctible to neutral  ----------  RANGE OF MOTION:  Normal (0-120 degrees) with no extensor lag or flexion contracture  LIGAMENTOUS STABILITY:   stable to varus and valgus stress at terminal extension and 30 degrees; retensioning of the MCL is appreciated with valgus stress at 30 degrees consistent with medial compartment degeneration  ----------  STRENGTH:  KNEE FLEXION 5/5  KNEE EXTENSION  5/5  ANKLE DORSIFLEXION  5/5  ANKLE PLANTARFLEXION  " 5/5  ----------  PAIN WITH PALPATION:medial joint line  KNEE EFFUSION: yes, trace effusion  PAIN WITH KNEE ROM: yes  PATELLAR CREPITUS:  yes, painful and symptomatic  ----------  SENSATION TO LIGHT TOUCH:  DEEP PERONEAL/SUPERFICIAL PERONEAL/SURAL/SAPHENOUS/TIBIAL:    intact  ----------  EDEMA:  no  ERYTHEMA:    no  WOUNDS/INCISIONS:   no  _____________________________________________________________________  _____________________________________________________________________    RADIOGRAPHIC FINDINGS:   No new imaging today    Assessment/Plan:   Diagnosis Plan   1. Primary osteoarthritis of left knee  Large Joint Arthrocentesis: L knee   2. Obesity (BMI 30-39.9)       The patient has failed conservative treatment measures including anti-inflammatories and cortisone injection.  I discussed the importance of weight loss to decrease joint reaction forces.  For every pound of fluctuation in weight, the knee joint has a 4-5 fold change in forces through the joint.  I had discussed the weight loss can improve joint pain.  I discussed activity modification as well including low impact exercise for weight loss.  Patient will pursue diet and low impact exercise with a goal of gradual controlled weight loss.  Alternative conservative treatment measures were discussed including bracing, therapy, topical/oral anti-inflammatories, activity modification, and weight loss.  The patient considered these treatment options and wishes to proceed with corticosteroid injection(s) today.  Therefore we will proceed with corticosteroid injection(s) today.  Follow-up 3 months for repeat evaluation with x-ray 4 views left knee.    Procedure Note:  I discussed with the patient the potential benefits of performing a therapeutic injection of the left knee as well as potential risks including but not limited to infection, swelling, pain, bleeding, bruising, nerve/vessel damage, skin color changes, transient elevation in blood glucose levels, and  fat atrophy. After informed consent and verifying correct patient, procedure site, and type of procedure, the area was prepped with alcohol, ethyl chloride was used to numb the skin. Via the superior lateral approach, 3cc of 1% lidocaine, 3cc of 0.25% bupivicaine and 2 cc of 40mg/ml of Kenalog were injected into the left knee. The patient tolerated the procedure well. There were no complications. A sterile dressing was placed over the injection site.        Saul Rangel MD  12/15/20  13:12 EST

## 2020-12-15 NOTE — PROGRESS NOTES
Procedure   Large Joint Arthrocentesis: L knee  Date/Time: 12/15/2020 12:59 PM  Consent given by: patient  Site marked: site marked  Timeout: Immediately prior to procedure a time out was called to verify the correct patient, procedure, equipment, support staff and site/side marked as required   Supporting Documentation  Indications: pain   Procedure Details  Location: knee - L knee  Preparation: Patient was prepped and draped in the usual sterile fashion  Needle size: 22 G  Approach: anteromedial  Medications administered: 3 mL lidocaine PF 1% 1 %; 3 mL bupivacaine (PF) 0.25 %; 80 mg triamcinolone acetonide 40 MG/ML  Patient tolerance: patient tolerated the procedure well with no immediate complications

## 2021-01-11 ENCOUNTER — HOSPITAL ENCOUNTER (OUTPATIENT)
Dept: MRI IMAGING | Facility: HOSPITAL | Age: 51
Discharge: HOME | End: 2021-01-11
Admitting: INTERNAL MEDICINE

## 2021-01-11 PROCEDURE — 72148 MRI LUMBAR SPINE W/O DYE: CPT

## 2021-01-13 DIAGNOSIS — M48.07 SPINAL STENOSIS OF LUMBOSACRAL REGION: Primary | ICD-10-CM

## 2021-01-13 DIAGNOSIS — M51.36 BULGING LUMBAR DISC: ICD-10-CM

## 2021-01-13 DIAGNOSIS — M54.41 CHRONIC MIDLINE LOW BACK PAIN WITH BILATERAL SCIATICA: ICD-10-CM

## 2021-01-13 DIAGNOSIS — M54.42 CHRONIC MIDLINE LOW BACK PAIN WITH BILATERAL SCIATICA: ICD-10-CM

## 2021-01-13 DIAGNOSIS — G89.29 CHRONIC MIDLINE LOW BACK PAIN WITH BILATERAL SCIATICA: ICD-10-CM

## 2021-01-27 ENCOUNTER — OFFICE VISIT (OUTPATIENT)
Dept: NEUROSURGERY | Facility: CLINIC | Age: 51
End: 2021-01-27

## 2021-01-27 VITALS — HEIGHT: 63 IN | WEIGHT: 196 LBS | BODY MASS INDEX: 34.73 KG/M2

## 2021-01-27 DIAGNOSIS — M54.42 CHRONIC BILATERAL LOW BACK PAIN WITH BILATERAL SCIATICA: Primary | ICD-10-CM

## 2021-01-27 DIAGNOSIS — M54.41 CHRONIC BILATERAL LOW BACK PAIN WITH BILATERAL SCIATICA: Primary | ICD-10-CM

## 2021-01-27 DIAGNOSIS — G89.29 CHRONIC BILATERAL LOW BACK PAIN WITH BILATERAL SCIATICA: Primary | ICD-10-CM

## 2021-01-27 PROBLEM — E66.09 CLASS 2 OBESITY DUE TO EXCESS CALORIES WITHOUT SERIOUS COMORBIDITY IN ADULT: Status: ACTIVE | Noted: 2021-01-27

## 2021-01-27 PROBLEM — E66.812 CLASS 2 OBESITY DUE TO EXCESS CALORIES WITHOUT SERIOUS COMORBIDITY IN ADULT: Status: ACTIVE | Noted: 2021-01-27

## 2021-01-27 PROCEDURE — 99204 OFFICE O/P NEW MOD 45 MIN: CPT | Performed by: NEUROLOGICAL SURGERY

## 2021-01-27 NOTE — PROGRESS NOTES
Subjective     Chief Complaint: Back pain    Patient ID: Nita Mcmahon is a 50 y.o. female seen for consultation today at the request of  Mary Figueroa*    Back Pain  This is a chronic problem. The current episode started more than 1 year ago. The problem occurs constantly. The problem has been gradually worsening since onset. The pain is present in the lumbar spine and sacro-iliac. The quality of the pain is described as aching. The pain is at a severity of 7/10. The pain is moderate. The symptoms are aggravated by standing and twisting. Stiffness is present all day. Associated symptoms include leg pain. Pertinent negatives include no bladder incontinence or bowel incontinence. Risk factors include lack of exercise, obesity and sedentary lifestyle. She has tried analgesics, chiropractic manipulation, home exercises and NSAIDs for the symptoms. The treatment provided mild relief.       This is a 50-year-old woman who presents to my office with chief complaints of a 6-year history of chronic low back pain.  She has occasional bilateral sciatica, but her main complaints are those of paraspinous muscle pain which radiates into the lower thoracic region into the sacroiliac joints bilaterally.  She is under the care of a chiropractor, but has not undergone any formal physical therapy or pain management.    Risk factors for back pain include moderate obesity with a BMI of 36, sedentary lifestyle with a job requiring her to sit for approximately 8 hours/day, and a lack of exercise/physical activity.  She denies alcohol or tobacco abuse.    The following portions of the patient's history were reviewed and updated as appropriate: allergies, current medications, past family history, past medical history, past social history, past surgical history and problem list.    Family history:   Family History   Problem Relation Age of Onset   • Arthritis Other    • Diabetes Other    • Hyperlipidemia Other    • Obesity  "Other    • Mental illness Other    • Heart attack Other        Social history:   Social History     Socioeconomic History   • Marital status:      Spouse name: Not on file   • Number of children: Not on file   • Years of education: Not on file   • Highest education level: Not on file   Tobacco Use   • Smoking status: Never Smoker   • Smokeless tobacco: Never Used   Substance and Sexual Activity   • Alcohol use: No   • Drug use: No   • Sexual activity: Yes     Partners: Male       Review of Systems   Gastrointestinal: Negative for bowel incontinence.   Genitourinary: Negative for bladder incontinence.   Musculoskeletal: Positive for back pain.   All other systems reviewed and are negative.      Objective   Height 158.8 cm (62.5\"), weight 88.9 kg (196 lb).  Body mass index is 35.28 kg/m².    Physical Exam  Vitals signs and nursing note reviewed.   Constitutional:       Appearance: She is well-developed. She is not toxic-appearing.   HENT:      Head: Normocephalic and atraumatic.      Right Ear: Hearing normal.      Left Ear: Hearing normal.      Nose: Nose normal.   Eyes:      General: Lids are normal.      Conjunctiva/sclera: Conjunctivae normal.      Pupils: Pupils are equal, round, and reactive to light.   Neck:      Musculoskeletal: Normal range of motion.      Vascular: No JVD.   Cardiovascular:      Rate and Rhythm: Normal rate and regular rhythm.      Pulses:           Radial pulses are 2+ on the right side and 2+ on the left side.   Pulmonary:      Effort: Pulmonary effort is normal. No respiratory distress.      Breath sounds: No stridor. No wheezing.   Skin:     General: Skin is warm and dry.      Findings: No erythema or rash.   Neurological:      Mental Status: She is alert and oriented to person, place, and time.      GCS: GCS eye subscore is 4. GCS verbal subscore is 5. GCS motor subscore is 6.      Cranial Nerves: No cranial nerve deficit.      Motor: No abnormal muscle tone.      Coordination: " Coordination is intact. Romberg sign negative. Coordination normal.      Gait: Gait is intact. Gait and tandem walk normal.      Deep Tendon Reflexes: Reflexes are normal and symmetric. Reflexes normal.      Reflex Scores:       Patellar reflexes are 2+ on the right side and 2+ on the left side.       Achilles reflexes are 2+ on the right side and 2+ on the left side.     Comments: Casual, toe raised, heel raised, and tandem gait all performed unremarkably.   Psychiatric:         Behavior: Behavior normal.         Thought Content: Thought content normal.         Judgment: Judgment normal.         Assessment/Plan     Independent Review of Radiographic Studies:      Available for my review is an MRI of the lumbar spine performed on January 11, 2020.  There is a grade 1 spondylolisthesis of L4 on L5.  There is a broad-based disc bulge which, combined with the facet arthropathy present at L4-5 and the congenitally narrowed spinal canal combines to effect severe lateral recess stenosis.  This is potentially contributing to bilateral L5 radiculopathies.  The remainder of the MRI is unremarkable.    Medical Decision Making:      This is a 50-year-old woman with primarily axial low back pain and complaints of intermittent bilateral sciatica.  She clearly has a potential structural lesion at L4-5 which might be contributing to L5 radiculopathies.  In any case, she is not tried any conservative treatment to speak of other than chiropractic care.  She has multiple risk factors for low back pain including obesity, sedentary lifestyle, and a lack of exercise.  I think she definitely needs some physical therapy to optimize her low back pain and this may in fact help with her sciatica since it does not seem to be in an obvious L5 distribution at this point.  I would like to follow-up with her in 2 months after she is undergone some physical therapy and some injections.  If she still having significant L5 radicular pain then we  could entertain the possibility of an L4-5 PLIF.  I reviewed the pertinent anatomy with the aid of a 3-dimensional model of the spine.  Risks and benefits of the surgery were briefly discussed.    Diagnoses and all orders for this visit:    1. Chronic bilateral low back pain with bilateral sciatica (Primary)  -     Ambulatory Referral to Physical Therapy  -     Ambulatory Referral to Pain Management        No follow-ups on file.           This document signed by SYLVESTER Chavez MD January 27, 2021 13:30 EST

## 2021-02-02 ENCOUNTER — HOSPITAL ENCOUNTER (OUTPATIENT)
Dept: PHYSICAL THERAPY | Facility: HOSPITAL | Age: 51
Setting detail: THERAPIES SERIES
Discharge: HOME | End: 2021-02-02

## 2021-02-02 DIAGNOSIS — M54.42 CHRONIC BILATERAL LOW BACK PAIN WITH BILATERAL SCIATICA: Primary | ICD-10-CM

## 2021-02-02 DIAGNOSIS — G89.29 CHRONIC BILATERAL LOW BACK PAIN WITH BILATERAL SCIATICA: Primary | ICD-10-CM

## 2021-02-02 DIAGNOSIS — M54.41 CHRONIC BILATERAL LOW BACK PAIN WITH BILATERAL SCIATICA: Primary | ICD-10-CM

## 2021-02-02 PROCEDURE — 97161 PT EVAL LOW COMPLEX 20 MIN: CPT

## 2021-02-02 NOTE — THERAPY EVALUATION
"    Outpatient Physical Therapy Ortho Initial Evaluation  [facility]     Patient Name: Nita Mcmahon  : 1970  MRN: [id number]  Today's Date: 2021      Visit Date: 2021    Patient Active Problem List   Diagnosis   • Irritable bowel syndrome   • Sciatica   • Arthritis   • Asthma   • Anxiety and depression   • Class 2 obesity due to excess calories without serious comorbidity in adult        Past Medical History:   Diagnosis Date   • History of esophageal reflux    • Muscle spasm of back         Past Surgical History:   Procedure Laterality Date   • APPENDECTOMY     •  SECTION     • HERNIA REPAIR     • HYSTERECTOMY         Visit Dx:     ICD-10-CM ICD-9-CM   1. Chronic bilateral low back pain with bilateral sciatica  M54.42 724.2    M54.41 724.3    G89.29 338.29         Patient History     Row Name 21 1500             History    Chief Complaint  Pain  -LF      Type of Pain  Back pain  -LF      Date Current Problem(s) Began  -- 10+years  -LF      Brief Description of Current Complaint  Nita presents with back pain that radiates into both legs.  She has had back pain for over 10 years, but has worsened significantly in the last year.   She is now having \"lightening strike nerve pain\" at times down both legs, and feels like knees will buckle.  Pain typically radiates to back of knee, but sometimes to calf.  Chirpactor treatment has helped previously, but nothing gives relief in the last year.   Had PT about 10 years ago.  She has been referred to pain management for possible injection (appointment next week).  She has stretches she does on regular basis.  Also reports chronic neck pain, and knee pain.   -LF      Previous treatment for THIS PROBLEM  Chiropractor for 6 years  -LF      Patient/Caregiver Goals  Relieve pain  -LF      What clinical tests have you had for this problem?  MRI  -LF      Results of Clinical Tests  Grade 1 anterolisthesis of L4 on L5 with spondylitic changes, " stenosis  -LF         Pain     Pain Location  Back;Leg  -LF      Pain at Present  7  -LF      Pain at Best  7  -LF      Pain at Worst  10  -LF      Pain Frequency  Constant/continuous  -LF      Pain Description  Dull;Aching;Sharp;Shooting  -LF      What Performance Factors Make the Current Problem(s) WORSE?  walking, standin, stairs (lives [address] floor apt.), standing to cook meal, sweeping, mopping  -LF      What Performance Factors Make the Current Problem(s) BETTER?  nothing helps (takes Meloxicam, ibuprofen, used Biofreeze)  -LF         Fall Risk Assessment    Any falls in the past year:  No  -LF         Daily Activities    Primary Language  English  -LF      Are you able to read  Yes  -LF      Are you able to write  Yes  -LF      How does patient learn best?  Demonstration  -LF      Teaching needs identified  Home Exercise Program  -LF      Barriers to learning  None  -LF      Pt Participated in POC and Goals  Yes  -LF        User Key  (r) = Recorded By, (t) = Taken By, (c) = Cosigned By    Initials Name Provider Type     Callie Moore, PT Physical Therapist          PT Ortho     Row Name 02/02/21 1500       Posture/Observations    Alignment Options  Forward head;Rounded shoulders  -LF    Forward Head  Mild  -LF    Rounded Shoulders  Mild  -LF       Special Tests/Palpation    Special Tests/Palpation  Lumbar/SI  -LF       Lumbar/SI Special Tests    SLR (Neural Tension)  Bilateral:;Negative  -LF    Thigh Thrust/Posterior Shear (SI Dysfunction)  Bilateral:;Negative  -LF    HENRY (hip vs. SI Dysfunction)  Bilateral:;Negative  -LF    Lumbar/SI Special Tests Comments  Increased central LBP/discomfort with prone on elbows, and press-up; no change in symtpoms with long-axis distraction LE  -LF       Lumbosacral Palpation    Lumbosacral Palpation?  Yes  -LF    Lumbosacral Segment  Guarded/taut no tendern lumbar or gluteal region  -LF       General ROM    Head/Neck/Trunk  Trunk Extension;Trunk Flexion;Trunk Lt Lateral  Flexion;Trunk Rt Lateral Flexion;Trunk Lt Rotation;Trunk Rt Rotation  -LF       Head/Neck/Trunk    Trunk Extension AROM  restricted on left (observed in standing and prone) with pain on return to neutral  -LF    Trunk Flexion AROM  WNL;s, pain upon return to neutral  -LF    Trunk Lt Lateral Flexion AROM  WNL, inc'd pain  -LF    Trunk Rt Lateral Flexion AROM  WNL  -LF    Head/Neck/Trunk Comments  pain upon return to neutral (mainly with left sidebending and flexion   -LF       MMT (Manual Muscle Testing)    General MMT Comments  LE strength is 5/5, except hip extension 4-5 bilaterally  -LF       Sensation    Sensation WNL?  WNL  -LF    Light Touch  No apparent deficits  -LF       Flexibility    Flexibility Tested?  Lower Extremity  -LF       Lower Extremity Flexibility    Hamstrings  Bilateral:;WNL  -LF    Hip External Rotators  Bilateral:;Mildly limited L>R  -LF      User Key  (r) = Recorded By, (t) = Taken By, (c) = Cosigned By    Initials Name Provider Type    Callie Dover, PT Physical Therapist        Therapy Education  Education Details: HEP provided for STKC, LTR, pelvic tilts, standing lumbar extension; also piriformis, quadratus, and HS stretches  Given: HEP, Symptoms/condition management  Program: New, Reinforced, Progressed  How Provided: Verbal, Demonstration, Written  Provided to: Patient  Level of Understanding: Teach back education performed, Verbalized, Demonstrated     PT OP Goals     Row Name 02/02/21 1500          PT Short Term Goals    STG Date to Achieve  03/02/21  -LF     STG 1  Patient to report 25% improvement in back and leg pain  -LF     STG 1 Progress  New  -LF     STG 2  Patient independent with HEP for management of symptoms.  -LF     STG 2 Progress  New  -        Long Term Goals    LTG Date to Achieve  03/30/21  -LF     LTG 1  Modified Oswestry improved by at least 10%  -LF     LTG 1 Progress  New  -LF     LTG 2  Patient able to tolerate standing at least 15 minutes with pain no  greater than 3/10  -LF     LTG 2 Progress  New  -LF     LTG 3  Lumbar ROM WNL's and painfree to allow for normal functional activities.  -LF     LTG 3 Progress  New  -LF     LTG 4  Patient able to go up/down stairs from 2nd floor apartment without increased pain.  -LF     LTG 4 Progress  New  -LF        Time Calculation    PT Goal Re-Cert Due Date  05/03/21  -LF       User Key  (r) = Recorded By, (t) = Taken By, (c) = Cosigned By    Initials Name Provider Type    LF Callie Moore, PT Physical Therapist          PT Assessment/Plan     Row Name 02/02/21 1500          PT Assessment    Functional Limitations  Performance in leisure activities;Performance in self-care ADL;Performance in work activities;Limitation in home management;Limitations in community activities  -     Impairments  Range of motion;Posture;Pain;Muscle strength;Impaired flexibility  -LF     Assessment Comments  Patient presents with worsening of chronic bilateral back with with sciatica.  She has increased pain with lumbar ROM, decreased strength and flexibility.  Further treatment indicated to help minimize pain and improve core strength and stability to promote improve function.  -     Please refer to paper survey for additional self-reported information  Yes  -LF     Rehab Potential  Good  -LF     Patient/caregiver participated in establishment of treatment plan and goals  Yes  -LF     Patient would benefit from skilled therapy intervention  Yes  -LF        PT Plan    PT Frequency  2x/week  -LF     Planned CPT's?  PT EVAL LOW COMPLEXITY: 72790;PT THER PROC EA 15 MIN: 09033;PT MANUAL THERAPY EA 15 MIN: 03164;PT ELECTRICAL STIM UNATTEND: ;PT ULTRASOUND EA 15 MIN: 22127;PT TRACTION LUMBAR: 13515;PT HOT/COLD PACK WC NONMCARE: 11949;PT SELF CARE/HOME MGMT/TRAIN EA 15: 77571  -LF     PT Plan Comments  cont. per POC.  Progress HEP to include core stability exercise.  -LF       User Key  (r) = Recorded By, (t) = Taken By, (c) = Cosigned By     Initials Name Provider Type     Callie Moore, PT Physical Therapist          Outcome Measure Options: Modifed Owestry  Modified Oswestry  Modified Oswestry Score/Comments: 22/50      Time Calculation:     Start Time: 1500     Therapy Charges for Today     Code Description Service Date Service Provider Modifiers Qty    20359211234 HC PT EVAL LOW COMPLEXITY 4 2/2/2021 Callie Moore, PT GP 1          PT G-Codes  Outcome Measure Options: Modifed Owestry  Modified Oswestry Score/Comments: 22/50         Callie Moore, PT  2/2/2021

## 2021-02-04 ENCOUNTER — HOSPITAL ENCOUNTER (OUTPATIENT)
Dept: PHYSICAL THERAPY | Facility: HOSPITAL | Age: 51
Setting detail: THERAPIES SERIES
Discharge: HOME | End: 2021-02-04

## 2021-02-04 DIAGNOSIS — M54.42 CHRONIC BILATERAL LOW BACK PAIN WITH BILATERAL SCIATICA: Primary | ICD-10-CM

## 2021-02-04 DIAGNOSIS — M54.41 CHRONIC BILATERAL LOW BACK PAIN WITH BILATERAL SCIATICA: Primary | ICD-10-CM

## 2021-02-04 DIAGNOSIS — G89.29 CHRONIC BILATERAL LOW BACK PAIN WITH BILATERAL SCIATICA: Primary | ICD-10-CM

## 2021-02-04 PROBLEM — M51.379 DEGENERATION OF LUMBAR OR LUMBOSACRAL INTERVERTEBRAL DISC: Status: ACTIVE | Noted: 2021-02-04

## 2021-02-04 PROBLEM — M48.062 LUMBAR STENOSIS WITH NEUROGENIC CLAUDICATION: Status: ACTIVE | Noted: 2021-02-04

## 2021-02-04 PROBLEM — M43.16 SPONDYLOLISTHESIS, LUMBAR REGION: Status: ACTIVE | Noted: 2021-02-04

## 2021-02-04 PROBLEM — M51.37 DEGENERATION OF LUMBAR OR LUMBOSACRAL INTERVERTEBRAL DISC: Status: ACTIVE | Noted: 2021-02-04

## 2021-02-04 PROBLEM — M47.816 SPONDYLOSIS OF LUMBAR REGION WITHOUT MYELOPATHY OR RADICULOPATHY: Status: ACTIVE | Noted: 2021-02-04

## 2021-02-04 PROCEDURE — 97110 THERAPEUTIC EXERCISES: CPT

## 2021-02-04 PROCEDURE — 97140 MANUAL THERAPY 1/> REGIONS: CPT

## 2021-02-04 NOTE — THERAPY TREATMENT NOTE
Outpatient Physical Therapy Ortho Treatment Note  [facility]     Patient Name: Nita Mcmahon  : 1970  MRN: [id number]  Today's Date: 2021      Visit Date: 2021    Visit Dx:    ICD-10-CM ICD-9-CM   1. Chronic bilateral low back pain with bilateral sciatica  M54.42 724.2    M54.41 724.3    G89.29 338.29       Patient Active Problem List   Diagnosis   • Irritable bowel syndrome   • Sciatica   • Arthritis   • Asthma   • Anxiety and depression   • Class 2 obesity due to excess calories without serious comorbidity in adult        Past Medical History:   Diagnosis Date   • History of esophageal reflux    • Muscle spasm of back         Past Surgical History:   Procedure Laterality Date   • APPENDECTOMY     •  SECTION     • HERNIA REPAIR     • HYSTERECTOMY             PT Assessment/Plan     Row Name 21 0800          PT Assessment    Assessment Comments  Patient tolerated ther ex well for flexibility and core stability.  Increased muscle tension bilaterally, improved some with manual therapy  -LF        PT Plan    PT Plan Comments  cont. per POC  -LF       User Key  (r) = Recorded By, (t) = Taken By, (c) = Cosigned By    Initials Name Provider Type    LF Callie Moore, PT Physical Therapist            OP Exercises     Row Name 21 0800             Subjective Comments    Subjective Comments  patient has had a rough morning.  Back pain, along with shooting pain and tingling down legs  -LF         Subjective Pain    Able to rate subjective pain?  yes  -LF      Pre-Treatment Pain Level  7  -LF      Post-Treatment Pain Level  5  -LF         Total Minutes    44021 - PT Therapeutic Exercise Minutes  30  -LF      87736 - PT Manual Therapy Minutes  10  -LF         Exercise 1    Exercise Name 1  NuStep L4  -LF      Time 1  5'  -LF         Exercise 2    Exercise Name 2  LTR and leg curl with SB  -LF      Reps 2  10 each  -LF         Exercise 3    Exercise Name 3  hip add ball squeeze in  supported tabletop position  -LF      Reps 3  10  -LF         Exercise 4    Exercise Name 4  HS and IT band stretches, SKTC  -LF      Time 4  5'  -LF         Exercise 5    Exercise Name 5  table top alternate toe taps/march  -LF      Reps 5  5  -LF         Exercise 6    Exercise Name 6  all 4's , angry cat, sidebend stretch, child's pose (fwd and SB)  -LF      Reps 6  10/10/2  -LF         Exercise 7    Exercise Name 7  supine clamshell with red band:  DL, SL  -LF      Reps 7  10 each  -LF         Exercise 8    Exercise Name 8  PPT  -LF      Reps 8  10  -LF         Exercise 9    Exercise Name 9  modified bridge  -LF      Reps 9  10  -LF        User Key  (r) = Recorded By, (t) = Taken By, (c) = Cosigned By    Initials Name Provider Type    LF Callie Moore PT Physical Therapist              Manual Rx (last 36 hours)      Manual Treatments     Row Name 02/04/21 0800             Total Minutes    44356 - PT Manual Therapy Minutes  10  -LF         Manual Rx 1    Manual Rx 1 Location  lumbar  -LF      Manual Rx 1 Type  STM including ASTYM technique to bilateral lumbosacral regions  Patient seated on stool and resting forward on table.  Bilat. ms tightness  -LF        User Key  (r) = Recorded By, (t) = Taken By, (c) = Cosigned By    Initials Name Provider Type    LF Callie Moore PT Physical Therapist            Time Calculation:   Start Time: 0805  Therapy Charges for Today     Code Description Service Date Service Provider Modifiers Qty    31870320209  PT THER PROC EA 15 MIN 2/4/2021 Callie Moore, PT GP 2    [id number]  PT MANUAL THERAPY EA 15 MIN 2/4/2021 Callie Moore PT GP 1                    Callie Moore PT  2/4/2021

## 2021-02-10 ENCOUNTER — APPOINTMENT (OUTPATIENT)
Dept: PHYSICAL THERAPY | Facility: HOSPITAL | Age: 51
End: 2021-02-10

## 2021-02-17 ENCOUNTER — HOSPITAL ENCOUNTER (OUTPATIENT)
Dept: PHYSICAL THERAPY | Facility: HOSPITAL | Age: 51
Setting detail: THERAPIES SERIES
Discharge: HOME | End: 2021-02-17

## 2021-02-17 DIAGNOSIS — M54.42 CHRONIC BILATERAL LOW BACK PAIN WITH BILATERAL SCIATICA: Primary | ICD-10-CM

## 2021-02-17 DIAGNOSIS — M54.41 CHRONIC BILATERAL LOW BACK PAIN WITH BILATERAL SCIATICA: Primary | ICD-10-CM

## 2021-02-17 DIAGNOSIS — G89.29 CHRONIC BILATERAL LOW BACK PAIN WITH BILATERAL SCIATICA: Primary | ICD-10-CM

## 2021-02-17 PROCEDURE — 97110 THERAPEUTIC EXERCISES: CPT

## 2021-02-17 NOTE — THERAPY TREATMENT NOTE
Outpatient Physical Therapy Ortho Treatment Note  [facility]     Patient Name: Nita Mcmahon  : 1970  MRN: [id number]  Today's Date: 2021      Visit Date: 2021    Visit Dx:    ICD-10-CM ICD-9-CM   1. Chronic bilateral low back pain with bilateral sciatica  M54.42 724.2    M54.41 724.3    G89.29 338.29     Patient Active Problem List   Diagnosis   • Irritable bowel syndrome   • Sciatica   • Arthritis   • Asthma   • Anxiety and depression   • Class 2 obesity due to excess calories without serious comorbidity in adult   • Lumbar stenosis with neurogenic claudication   • Spondylosis of lumbar region without myelopathy or radiculopathy   • Spondylolisthesis, lumbar region   • Degeneration of lumbar or lumbosacral intervertebral disc        Past Medical History:   Diagnosis Date   • History of esophageal reflux    • Muscle spasm of back      Past Surgical History:   Procedure Laterality Date   • APPENDECTOMY     •  SECTION     • HERNIA REPAIR     • HYSTERECTOMY         PT Assessment/Plan     Row Name 21 580          PT Assessment    Assessment Comments  Client tolerated prone to elbows very well and better with overpressure. She also did very well with press ups with overpressure. After press ups with overpressure she was able to transfer from stand to sit without intense jolting pain. 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 Exercises were changed to focus on extension for the next few days.   -MM        PT Plan    PT Plan Comments  Check response to exercises and adjust as needed..   -MM       User Key  (r) = Recorded By, (t) = Taken By, (c) = Cosigned By    Initials Name Provider Type    Saul Abbasi, PT Physical Therapist        OP Exercises     Row Name 21 5325             Subjective Comments    Subjective Comments  Client reports that she does not have pain at rest, but she still  experiences electrical jolts of pain. she noted 4 instances of jolting pain when walking from the parking garage to the clinic.   -MM         Subjective Pain    Able to rate subjective pain?  yes  -MM      Pre-Treatment Pain Level  0  -MM      Post-Treatment Pain Level  0  -MM      Subjective Pain Comment  8/10 when she experiences a jolt of pain  -MM         Total Minutes    33371 - PT Therapeutic Exercise Minutes  30  -MM         Exercise 1    Exercise Name 1  Updated and reviewed ability to transfer and perform extension exercises prone. Included prone to elbows 10x2, prone to elbows with OP 10x, and prone press ups w OP. Included education for exercises and frequency.   -MM      Time 1  30 min  -MM        User Key  (r) = Recorded By, (t) = Taken By, (c) = Cosigned By    Initials Name Provider Type    Saul Abbasi, PT Physical Therapist        Time Calculation:   Start Time: 1700  Therapy Charges for Today     Code Description Service Date Service Provider Modifiers Qty    65524236893 HC PT THER PROC EA 15 MIN 2/17/2021 Saul Noriega, PT GP 2        Saul Noriega, PT  2/17/2021

## 2021-02-24 ENCOUNTER — HOSPITAL ENCOUNTER (OUTPATIENT)
Dept: PHYSICAL THERAPY | Facility: HOSPITAL | Age: 51
Setting detail: THERAPIES SERIES
Discharge: HOME | End: 2021-02-24

## 2021-02-24 DIAGNOSIS — M54.42 CHRONIC BILATERAL LOW BACK PAIN WITH BILATERAL SCIATICA: Primary | ICD-10-CM

## 2021-02-24 DIAGNOSIS — M54.41 CHRONIC BILATERAL LOW BACK PAIN WITH BILATERAL SCIATICA: Primary | ICD-10-CM

## 2021-02-24 DIAGNOSIS — G89.29 CHRONIC BILATERAL LOW BACK PAIN WITH BILATERAL SCIATICA: Primary | ICD-10-CM

## 2021-02-24 PROCEDURE — 97110 THERAPEUTIC EXERCISES: CPT

## 2021-02-24 NOTE — THERAPY TREATMENT NOTE
Outpatient Physical Therapy Ortho Treatment Note  [facility]     Patient Name: Nita Mcmahon  : 1970  MRN: [id number]  Today's Date: 2021      Visit Date: 2021    Visit Dx:    ICD-10-CM ICD-9-CM   1. Chronic bilateral low back pain with bilateral sciatica  M54.42 724.2    M54.41 724.3    G89.29 338.29       Patient Active Problem List   Diagnosis   • Irritable bowel syndrome   • Sciatica   • Arthritis   • Asthma   • Anxiety and depression   • Class 2 obesity due to excess calories without serious comorbidity in adult   • Lumbar stenosis with neurogenic claudication   • Spondylosis of lumbar region without myelopathy or radiculopathy   • Spondylolisthesis, lumbar region   • Degeneration of lumbar or lumbosacral intervertebral disc        Past Medical History:   Diagnosis Date   • History of esophageal reflux    • Muscle spasm of back         Past Surgical History:   Procedure Laterality Date   • APPENDECTOMY     •  SECTION     • HERNIA REPAIR     • HYSTERECTOMY         PT Assessment/Plan     Row Name 21 9029          PT Assessment    Assessment Comments  Client is able to stretch into extension without increase in pain. She was able to push up into 50% of extension at the start of the session today. After working on the angled table with prolonged passive stretching she was able to press up 80-90% of full range. Though she continues with frequent jolts of pain, she demonstrates improved ability to transfer after press ups and extension exercises.   -MM        PT Plan    PT Plan Comments  Continue per plan of treatment updating exercises as needed.   -MM       User Key  (r) = Recorded By, (t) = Taken By, (c) = Cosigned By    Initials Name Provider Type    Saul Abbasi, PT Physical Therapist        OP Exercises     Row Name 21 0766             Subjective Comments    Subjective Comments  Client has begun working on extension exercises. She has yet to notice a  signficant change as she still notices jolts of pain that occur often. She feels that jolts of pain are sometimes occuring with neck movement into flexion or extension, but not consistently reproducible.   -MM         Subjective Pain    Able to rate subjective pain?  yes  -MM      Pre-Treatment Pain Level  0  -MM      Post-Treatment Pain Level  0  -MM      Subjective Pain Comment  Up to 8/10 for brief moments when she has jolts of pain that occure often.   -MM         Total Minutes    04533 - PT Therapeutic Exercise Minutes  30  -MM         Exercise 1    Exercise Name 1  Included press ups with OP. Also included passive stretching incrementally with angled table to increase extension. followed up with press ups after stretching with angled table. INcluded hooklying LTR as well.   -MM      Time 1  30 min  -MM        User Key  (r) = Recorded By, (t) = Taken By, (c) = Cosigned By    Initials Name Provider Type    Saul Abbasi, PT Physical Therapist        Time Calculation:   Start Time: 1700  Therapy Charges for Today     Code Description Service Date Service Provider Modifiers Qty    84340448872  PT THER PROC EA 15 MIN 2/24/2021 Saul Noriega, PT GP 2          Saul Noriega, PT  2/24/2021

## 2021-02-25 ENCOUNTER — TELEPHONE (OUTPATIENT)
Dept: INTERNAL MEDICINE | Facility: CLINIC | Age: 51
End: 2021-02-25

## 2021-02-25 NOTE — TELEPHONE ENCOUNTER
PATIENT HAS FLUID IN EARS, STUFFY EARS, HEADACHE, SINUS PRESSURE.  PATIENT WOULD LIKE ANTIBIOTICS FOR SINUS INFECTION.     PHARMACY:  [address]    PLEASE CALL [phone]

## 2021-02-26 NOTE — TELEPHONE ENCOUNTER
If anyone calls for antibiotics without being seen, I can’t do it. There are too many “colds” that end up being covid. I would rec utc as it’s going into weekend and I wouldn’t get the test back in time to tell her. Tell her so sorry but I’m seeing that all the time.

## 2021-02-26 NOTE — TELEPHONE ENCOUNTER
Advised pt. She stated she had a lot going on with work and would try to go to [facility] if she felt it was needed. She has appt here on Monday and I told her if she is still sick she will have to wait in car and go from there

## 2021-03-01 ENCOUNTER — OFFICE VISIT (OUTPATIENT)
Dept: INTERNAL MEDICINE | Facility: CLINIC | Age: 51
End: 2021-03-01

## 2021-03-01 VITALS
WEIGHT: 195 LBS | TEMPERATURE: 97.1 F | SYSTOLIC BLOOD PRESSURE: 126 MMHG | BODY MASS INDEX: 34.55 KG/M2 | DIASTOLIC BLOOD PRESSURE: 84 MMHG | HEIGHT: 63 IN

## 2021-03-01 DIAGNOSIS — M54.2 CHRONIC NECK PAIN: ICD-10-CM

## 2021-03-01 DIAGNOSIS — G89.29 CHRONIC NECK PAIN: ICD-10-CM

## 2021-03-01 DIAGNOSIS — R03.0 ELEVATED BLOOD PRESSURE, SITUATIONAL: ICD-10-CM

## 2021-03-01 DIAGNOSIS — F41.9 ANXIETY AND DEPRESSION: ICD-10-CM

## 2021-03-01 DIAGNOSIS — G89.29 CHRONIC BILATERAL LOW BACK PAIN WITH SCIATICA, SCIATICA LATERALITY UNSPECIFIED: Primary | ICD-10-CM

## 2021-03-01 DIAGNOSIS — F32.A ANXIETY AND DEPRESSION: ICD-10-CM

## 2021-03-01 DIAGNOSIS — M54.40 CHRONIC BILATERAL LOW BACK PAIN WITH SCIATICA, SCIATICA LATERALITY UNSPECIFIED: Primary | ICD-10-CM

## 2021-03-01 PROCEDURE — 99214 OFFICE O/P EST MOD 30 MIN: CPT | Performed by: INTERNAL MEDICINE

## 2021-03-03 ENCOUNTER — APPOINTMENT (OUTPATIENT)
Dept: PHYSICAL THERAPY | Facility: HOSPITAL | Age: 51
End: 2021-03-03

## 2021-03-09 ENCOUNTER — TELEPHONE (OUTPATIENT)
Dept: INTERNAL MEDICINE | Facility: CLINIC | Age: 51
End: 2021-03-09

## 2021-03-09 ENCOUNTER — OFFICE VISIT (OUTPATIENT)
Dept: PAIN MEDICINE | Facility: CLINIC | Age: 51
End: 2021-03-09

## 2021-03-09 VITALS
SYSTOLIC BLOOD PRESSURE: 140 MMHG | BODY MASS INDEX: 34.38 KG/M2 | DIASTOLIC BLOOD PRESSURE: 90 MMHG | HEIGHT: 63 IN | HEART RATE: 86 BPM | OXYGEN SATURATION: 99 % | WEIGHT: 194 LBS

## 2021-03-09 DIAGNOSIS — M48.062 LUMBAR STENOSIS WITH NEUROGENIC CLAUDICATION: Primary | ICD-10-CM

## 2021-03-09 DIAGNOSIS — M43.16 SPONDYLOLISTHESIS, LUMBAR REGION: ICD-10-CM

## 2021-03-09 DIAGNOSIS — M47.816 SPONDYLOSIS OF LUMBAR REGION WITHOUT MYELOPATHY OR RADICULOPATHY: ICD-10-CM

## 2021-03-09 DIAGNOSIS — F32.A ANXIETY AND DEPRESSION: ICD-10-CM

## 2021-03-09 DIAGNOSIS — M51.37 DEGENERATION OF LUMBAR OR LUMBOSACRAL INTERVERTEBRAL DISC: ICD-10-CM

## 2021-03-09 DIAGNOSIS — F41.9 ANXIETY AND DEPRESSION: ICD-10-CM

## 2021-03-09 DIAGNOSIS — E66.09 CLASS 2 OBESITY DUE TO EXCESS CALORIES WITHOUT SERIOUS COMORBIDITY WITH BODY MASS INDEX (BMI) OF 35.0 TO 35.9 IN ADULT: ICD-10-CM

## 2021-03-09 DIAGNOSIS — M48.062 LUMBAR STENOSIS WITH NEUROGENIC CLAUDICATION: ICD-10-CM

## 2021-03-09 PROCEDURE — 99204 OFFICE O/P NEW MOD 45 MIN: CPT | Performed by: ANESTHESIOLOGY

## 2021-03-09 RX ORDER — GABAPENTIN 100 MG/1
CAPSULE ORAL
Qty: 90 CAPSULE | Refills: 1 | Status: SHIPPED | OUTPATIENT
Start: 2021-03-09 | End: 2021-04-13

## 2021-03-09 RX ORDER — MULTIVITAMIN WITH IRON
100 TABLET ORAL DAILY
Qty: 90 TABLET | Refills: 0 | Status: SHIPPED | OUTPATIENT
Start: 2021-03-09 | End: 2022-12-05

## 2021-03-09 RX ORDER — ME-TETRAHYDROFOLATE/B12/HRB236 1-1-500 MG
1 CAPSULE ORAL DAILY
Qty: 90 CAPSULE | Refills: 1 | Status: SHIPPED | OUTPATIENT
Start: 2021-03-09 | End: 2021-09-13

## 2021-03-09 RX ORDER — AMOXICILLIN AND CLAVULANATE POTASSIUM 875; 125 MG/1; MG/1
1 TABLET, FILM COATED ORAL 2 TIMES DAILY
Qty: 14 TABLET | Refills: 0 | Status: SHIPPED | OUTPATIENT
Start: 2021-03-09 | End: 2021-03-16

## 2021-03-09 NOTE — TELEPHONE ENCOUNTER
PT SAID SHE WAS SEEN IN OFFICE ON MARCH 1 FOR AN EARACHE.  PT SAID SHE HAS FINISHED MEDICATION AND STILL HAS THE EARACHE.  PT IS REQUESTING ANOTHER [facility] OR SOMETHING ELSE BE CALLED IN FOR HER TO [facility] ON [address].  PT REQUESTING CALL BACK TO LET HER KNOW SOMETHING HAS BEEN CALLED IN FOR HER.

## 2021-03-10 ENCOUNTER — APPOINTMENT (OUTPATIENT)
Dept: PHYSICAL THERAPY | Facility: HOSPITAL | Age: 51
End: 2021-03-10

## 2021-03-21 ENCOUNTER — APPOINTMENT (OUTPATIENT)
Dept: PREADMISSION TESTING | Facility: HOSPITAL | Age: 51
End: 2021-03-21

## 2021-03-21 PROCEDURE — U0004 COV-19 TEST NON-CDC HGH THRU: HCPCS

## 2021-03-21 PROCEDURE — C9803 HOPD COVID-19 SPEC COLLECT: HCPCS

## 2021-03-22 ENCOUNTER — HOSPITAL ENCOUNTER (OUTPATIENT)
Dept: GENERAL RADIOLOGY | Facility: HOSPITAL | Age: 51
Discharge: HOME | End: 2021-03-22
Admitting: ANESTHESIOLOGY

## 2021-03-22 DIAGNOSIS — M48.062 LUMBAR STENOSIS WITH NEUROGENIC CLAUDICATION: ICD-10-CM

## 2021-03-22 LAB — SARS-COV-2 RNA NOSE QL NAA+PROBE: NOT DETECTED

## 2021-03-22 PROCEDURE — 72114 X-RAY EXAM L-S SPINE BENDING: CPT

## 2021-03-24 ENCOUNTER — OUTSIDE FACILITY SERVICE (OUTPATIENT)
Dept: PAIN MEDICINE | Facility: CLINIC | Age: 51
End: 2021-03-24

## 2021-03-24 PROCEDURE — 64483 NJX AA&/STRD TFRM EPI L/S 1: CPT | Performed by: ANESTHESIOLOGY

## 2021-03-25 ENCOUNTER — TELEPHONE (OUTPATIENT)
Dept: PAIN MEDICINE | Facility: CLINIC | Age: 51
End: 2021-03-25

## 2021-03-25 NOTE — TELEPHONE ENCOUNTER
patient said she is doing great, she said she slept well and woke up pain free and pleasant.  Is looking forward to her follow up.

## 2021-03-31 ENCOUNTER — HOSPITAL ENCOUNTER (OUTPATIENT)
Dept: NEUROLOGY | Facility: HOSPITAL | Age: 51
Discharge: HOME | End: 2021-03-31
Admitting: ANESTHESIOLOGY

## 2021-03-31 DIAGNOSIS — M51.37 DEGENERATION OF LUMBAR OR LUMBOSACRAL INTERVERTEBRAL DISC: ICD-10-CM

## 2021-03-31 DIAGNOSIS — M43.16 SPONDYLOLISTHESIS, LUMBAR REGION: ICD-10-CM

## 2021-03-31 DIAGNOSIS — M47.816 SPONDYLOSIS OF LUMBAR REGION WITHOUT MYELOPATHY OR RADICULOPATHY: ICD-10-CM

## 2021-03-31 DIAGNOSIS — M48.062 LUMBAR STENOSIS WITH NEUROGENIC CLAUDICATION: ICD-10-CM

## 2021-03-31 PROCEDURE — 95911 NRV CNDJ TEST 9-10 STUDIES: CPT | Performed by: PSYCHIATRY & NEUROLOGY

## 2021-03-31 PROCEDURE — 95886 MUSC TEST DONE W/N TEST COMP: CPT | Performed by: PSYCHIATRY & NEUROLOGY

## 2021-03-31 PROCEDURE — 95911 NRV CNDJ TEST 9-10 STUDIES: CPT

## 2021-03-31 PROCEDURE — 95886 MUSC TEST DONE W/N TEST COMP: CPT

## 2021-04-12 DIAGNOSIS — M48.062 LUMBAR STENOSIS WITH NEUROGENIC CLAUDICATION: ICD-10-CM

## 2021-04-13 RX ORDER — GABAPENTIN 100 MG/1
CAPSULE ORAL
Qty: 90 CAPSULE | Refills: 1 | Status: SHIPPED | OUTPATIENT
Start: 2021-04-13 | End: 2021-04-27 | Stop reason: SDUPTHER

## 2021-04-13 RX ORDER — GABAPENTIN 100 MG/1
CAPSULE ORAL
Qty: 90 CAPSULE | OUTPATIENT
Start: 2021-04-13

## 2021-04-22 NOTE — PROGRESS NOTES
"Chief Complaint: \"My pain feels worse than before.\"        History of Present Illness:   Patient: Ms. Nita Mcmahon, 51 y.o. female originally referred by Dr. Primo Chavez in consultation for chronic intractable lower back pain. Patient reports a 6-year history of lower back pain, which began without incident.  She was last seen on March 24, 2021, when she underwent diagnostic and therapeutic bilateral L4-L5 transforaminal epidural steroid injections, for which she reports experiencing 100% pain relief and functional improvement lasting 1 1/2 weeks, with gradual recurrence to previous levels.  She did not begin physical therapy, despite medical advice.  She underwent EMG/NCV of the bilateral lower extremities, which revealed bilateral S1 radiculopathies.  X-ray of the lumbar spine with flexion-extension views revealed a grade 1 anterolisthesis of L4 on L5 with minimal instability seen.  She was started on a trial of gabapentin 100 mg 3 times daily, from which she reports benefit. She returns today for post procedure follow-up and evaluation.  Pain Description: Constant pain with intermittent exacerbation, described as aching, shocking and burning sensation.   Radiation of Pain: The pain radiates into the sacroiliac joint regions and down the lower extremities down to the calves  Pain intensity today: 7/10  Average pain intensity last week: 7/10  Pain intensity ranges from: 5/10 to 10/10  Aggravating factors: Pain increases with bending, twisting, standing, walking. Patient describes neurogenic claudication.    Alleviating factors: Pain decreases with sitting down, lying down   Associated Symptoms:   Patient reports pain, numbness, tingling, and weakness in the lower extremities   Patient denies any new bladder or bowel problems.   Patient reports difficulties with her balance but denies recent falls.     Review of previous therapies and additional medical records:  Nita Mcmahon has already failed " the following measures, including:   Conservative Measures: Oral analgesics, topical analgesics, ice, heat, chiropractic therapy,massage, physical therapy   Interventional Measures: 03/24/2021: diagnostic and therapeutic bilateral L4-L5 transforaminal epidural steroid injections   Surgical Measures: No history of previous lumbar spine or hip surgery   Nita Mcmahon underwent neurosurgical consultation with Dr. Primo Chavez on 01/27/2021, and was found to be a potential surgical candidate for PLIF L4-L5.  Nita Mcmahon presents with significant comorbidities including IBS, asthma, anxiety and depression, moderate obesity  In terms of current analgesics, Nita Mcmahon takes: Meloxicam, cyclobenzaprine. Patient also takes venlafaxine  I have reviewed Isaiah Report #[id number] consistent with medication reconciliation.  SOAPP: Low Risk    Global Pain Scale 03-09 2021 04-27 2021         Pain 22 20         Feelings 15 6         Clinical outcomes 25 22         Activities 25 25         GPS Total: 87            Review of New Diagnostic Studies:  EMG/NCV of the bilateral lower extremities 3/31/2021: Possible bilateral S1 radiculopathies  X-ray lumbar spine with flexion-extension views 3/20/2021: Grade 1 anterolisthesis of L4 on L5, with minimal instability.    Review of Diagnostic Studies:    MRI of the lumbar spine without contrast January 11, 2021: Images revealed grade 1 anterolisthesis of L4 on L5.  Spinal cord and cauda equina image normal.  Axial imaging:  L1-L2: Broad-based disc bulge, mild ligamentum flavum hypertrophy, facet hypertrophy contributing to mild spinal canal stenosis.  No significant neuroforaminal stenosis  L2-L3: Moderate circumferential disc bulge, moderate to severe ligamentum flavum hypertrophy, moderate facet hypertrophy contributing to moderate canal stenosis and mild bilateral neuroforaminal stenosis  L3-L4: Moderate circumferential disc bulge, severe ligamentum flavum  hypertrophy, moderate facet hypertrophy contributing to moderate left paracentral spinal canal stenosis with mild bilateral neuroforaminal stenosis  L4-L5: Large circumferential disc bulge with extension into the lateral regions combined with severe ligamentum flavum hypertrophy, moderate facet hypertrophy to produce severe left paracentral spinal canal stenosis with moderate to severe right and moderate left neuroforaminal stenosis  L5-S1: Circumferential disc bulge, my ligamentum flavum hypertrophy and facet hypertrophy contributing to mild canal stenosis.  No significant foraminal stenosis    Review of Systems   Musculoskeletal: Positive for back pain and neck pain.   Allergic/Immunologic: Positive for environmental allergies.   Neurological: Positive for weakness and headaches.   Psychiatric/Behavioral: Positive for sleep disturbance. The patient is nervous/anxious.    All other systems reviewed and are negative.        Patient Active Problem List   Diagnosis   • Irritable bowel syndrome   • Sciatica   • Arthritis   • Asthma   • Anxiety and depression   • Class 2 obesity due to excess calories without serious comorbidity in adult   • Lumbar stenosis with neurogenic claudication   • Spondylosis of lumbar region without myelopathy or radiculopathy   • Spondylolisthesis, lumbar region   • Degeneration of lumbar or lumbosacral intervertebral disc       Past Medical History:   Diagnosis Date   • History of esophageal reflux    • Muscle spasm of back          Past Surgical History:   Procedure Laterality Date   • APPENDECTOMY     •  SECTION     • HERNIA REPAIR     • HYSTERECTOMY           Family History   Problem Relation Age of Onset   • Arthritis Other    • Diabetes Other    • Hyperlipidemia Other    • Obesity Other    • Mental illness Other    • Heart attack Other          Social History     Socioeconomic History   • Marital status:      Spouse name: Not on file   • Number of children: Not on file  "  • Years of education: Not on file   • Highest education level: Not on file   Tobacco Use   • Smoking status: Never Smoker   • Smokeless tobacco: Never Used   Substance and Sexual Activity   • Alcohol use: No   • Drug use: No   • Sexual activity: Yes     Partners: Male           Current Outpatient Medications:   •  albuterol (PROAIR HFA) 108 (90 BASE) MCG/ACT inhaler, ProAir  (90 Base) MCG/ACT Inhalation Aerosol Solution; Patient Sig: ProAir  (90 Base) MCG/ACT Inhalation Aerosol Solution INHALE 1 TO 2 PUFFS EVERY 4 TO 6 HOURS AS NEEDED.; 1; 1; 28-Jan-2016; Active, Disp: , Rfl:   •  estradiol (ESTRACE) 1 MG tablet, TAKE 1 TABLET BY MOUTH EVERY DAY, Disp: 90 tablet, Rfl: 2  •  gabapentin (NEURONTIN) 100 MG capsule, TAKE ONE CAPSULE BY MOUTH DAILY FOR 3 DAYS THEN 1 CAPSULE TWICE DAILY FOR 3 DAYS, THEN 1 CAPSULE THREE TIMES DAILY AND CONTINUE., Disp: 90 capsule, Rfl: 1  •  meloxicam (MOBIC) 15 MG tablet, Take 1 tablet by mouth Daily., Disp: 90 tablet, Rfl: 3  •  venlafaxine XR (EFFEXOR-XR) 75 MG 24 hr capsule, Take 1 capsule by mouth Daily., Disp: 90 capsule, Rfl: 3  •  vitamin B-6 (PYRIDOXINE) 100 MG tablet, Take 1 tablet by mouth Daily., Disp: 90 tablet, Rfl: 0  •  cyclobenzaprine (FLEXERIL) 10 MG tablet, Take 1 tablet by mouth 3 (Three) Times a Day As Needed for Muscle Spasms., Disp: 21 tablet, Rfl: 0  •  Dietary Management Product (Rheumate) capsule, Take 1 capsule by mouth Daily., Disp: 90 capsule, Rfl: 1      Allergies   Allergen Reactions   • Avelox  [Moxifloxacin Hcl] Anaphylaxis   • Avelox [Moxifloxacin]    • Morphine Itching         /80 (BP Location: Right arm)   Pulse 80   Temp 95.6 °F (35.3 °C)   Resp 15   Ht 158.8 cm (62.52\")   Wt 88.6 kg (195 lb 6.4 oz)   SpO2 98%   BMI 35.15 kg/m²       Physical Exam:  Constitutional: Patient appears well-developed, well-nourished, well-hydrated  HEENT: Head: Normocephalic and atraumatic  Eyes: Conjunctivae and lids are normal  Pupils: Equal, " round, reactive to light  Neck: Trachea normal. Neck supple. No JVD present.   Pulmonary: No increased work of breathing or signs of respiratory distress. Auscultation of lungs: Clear to auscultation.   Cardiovascular: Normal rate and rhythm, normal S1 and S2, no murmurs.   Peripheral vascular exam: Femoral: right 2+, left 2+. Posterior tibialis: right 2+ and left 2+. Dorsalis pedis: right 2+ and left 2+. No edema.   Gait and station: Gait evaluation demonstrated a normal gait. Able to walk on heels, toes, tandem walking   Lumbar spine: Passive and active range of motion are limited secondary to pain. Extension, lateral flexion, rotation of the lumbar spine increased and reproduced pain (gluteal and posterior leg pain). Lumbar facet joint loading maneuvers are equivocal  Maxi test, Gaenslen's test, thigh thrust test, SI compression test are negative   Piriformis maneuvers are negative   Palpation of the bilateral greater trochanter, unrevealing   Examination of the Iliotibial band: unrevealing   Hip joints: The range of motion of the hip joints is almost full and without pain   Neurological:   Patient is alert and oriented to person, place, and time.   Speech: Normal.   Cortical function: Normal mental status.   Cranial nerves 2-12: intact.   Reflex Scores:  Right patellar: 2+  Left patellar: 2+  Right Achilles: 2+  Left Achilles: 2+  Motor strength: 5/5  Motor Tone: Normal .   Involuntary movements: None.   Superficial/Primitive Reflexes: Primitive reflexes were absent.   Right Hebert: Absent  Left Hebert: Absent  Right ankle clonus: Absent  Left ankle clonus: Absent   Babinsky: Absent  Long tract signs: Negative. Straight leg raising test: Negative.   Sensory exam: Intact to light touch, intact pain and temperature sensation, intact vibration sensation and normal proprioception.   Coordination: Normal finger to nose and heel to shin. Normal balance and negative Romberg's sign   Skin and subcutaneous tissue:  Skin is warm and intact. No rash noted. No cyanosis.   Psychiatric: Judgment and insight: Normal. Orientation to person, place and time: Normal. Recent and remote memory: Intact. Mood and affect: Normal.     I have reviewed the physical exam dated 03/09/2021. Upon examination of the patient today, there are no changes noted.      ASSESSMENT:   1. Lumbar stenosis with neurogenic claudication    2. Spondylolisthesis, lumbar region    3. Degeneration of lumbar or lumbosacral intervertebral disc    4. Spondylosis of lumbar region without myelopathy or radiculopathy    5. Anxiety and depression    6. Class 2 obesity due to excess calories without serious comorbidity with body mass index (BMI) of 35.0 to 35.9 in adult          PLAN/MEDICAL DECISION MAKING:  Patient reports a 6-year history of lower back pain. Patient has failed to obtain pain relief with conservative measures including analgesics, chiropractic therapy, physical therapy. MRI of the lumbar spine without contrast from January 11, 2021:  Revealed grade 1 anterolisthesis of L4 on L5. Multilevel lumbar spondylosis with varying degrees of disc bulges, facet hypertrophy, and ligamentum flavum hypertrophy contributing to various degrees of canal and neuroforaminal stenosis. L4-L5: Large circumferential disc bulge with extension into the lateral regions combined with severe ligamentum flavum hypertrophy, moderate facet hypertrophy to produce severe left paracentral spinal canal stenosis with moderate to severe right and moderate left neuroforaminal stenosis which provides clinical and radiological correlation.  EMG/NCV of the bilateral lower extremities, which revealed bilateral S1 radiculopathies.  X-ray of the lumbar spine with flexion-extension views revealed a grade 1 anterolisthesis of L4 on L5 with minimal instability seen. Nita Mcmahon underwent neurosurgical consultation with Dr. Primo Chavez on 01/27/2021, and was found to be a potential  surgical candidate for PLIF L4-L5 if failure to obtain pain relief with conservative and interventional pain management measures.   I have reviewed all available patient's medical records as well as previous therapies as referenced above. I had a lengthy conversation with Ms. Nita Mcmahon regarding her chronic pain condition and potential therapeutic options including risks, benefits, alternative therapies, to name a few. Therefore, I have proposed the following plan:  1. Pharmacological measures: Reviewed and discussed;   A. Patient takes meloxicam, cyclobenzaprine. Patient also takes venlafaxine  B. Continue gabapentin 100 mg three times per day.    C. Continue Rheumate one tablet twice daily  D. Continue pyridoxine 100 mg one tablet by mouth daily  2. Interventional pain management measures: Patient will be scheduled for bilateral L4-L5 transforaminal epidural steroid injections. We may repeat epidurals depending on patient's outcome.  Patient will follow-up with Dr. Chavez thereafter for possible PLIF L4-L5  3. Long-term rehabilitation efforts:  A. The patient does not have a history of falls. I did complete a risk assessment for falls  B. Patient will start a comprehensive physical therapy program at [facility] for reconditioning, therapeutic exercise, core strengthening, gait and balance training, neurodynamics, myofascial release, cupping and dry needling   C. Start an exercise program such as water therapy  D. Contrast therapy: Apply ice-packs for 15-20 minutes, followed by heating pads for 15-20 minutes to affected area   4. The patient has been instructed to contact my office with any questions or difficulties. The patient understands the plan and agrees to proceed accordingly.        Patient Care Team:  Mary Dixon MD as PCP - General     No orders of the defined types were placed in this encounter.        Future Appointments   Date Time Provider Department Center   5/5/2021  9:30 AM  Primo Chavez MD [facility]   9/13/2021  8:15 AM Mary Dixon MD [facility]         TONY Sullivan/Transcription disclaimer:  Much of this encounter note is an electronic transcription of spoken language to printed text. Electronic transcription of spoken language may permit erroneous, or at times, nonsensical words or phrases to be inadvertently transcribed. Although I have reviewed the note for such errors, some may still exist.

## 2021-04-27 ENCOUNTER — OFFICE VISIT (OUTPATIENT)
Dept: PAIN MEDICINE | Facility: CLINIC | Age: 51
End: 2021-04-27

## 2021-04-27 VITALS
OXYGEN SATURATION: 98 % | RESPIRATION RATE: 15 BRPM | TEMPERATURE: 95.6 F | HEART RATE: 80 BPM | BODY MASS INDEX: 34.62 KG/M2 | SYSTOLIC BLOOD PRESSURE: 122 MMHG | HEIGHT: 63 IN | DIASTOLIC BLOOD PRESSURE: 80 MMHG | WEIGHT: 195.4 LBS

## 2021-04-27 DIAGNOSIS — E66.09 CLASS 2 OBESITY DUE TO EXCESS CALORIES WITHOUT SERIOUS COMORBIDITY WITH BODY MASS INDEX (BMI) OF 35.0 TO 35.9 IN ADULT: ICD-10-CM

## 2021-04-27 DIAGNOSIS — M43.16 SPONDYLOLISTHESIS, LUMBAR REGION: ICD-10-CM

## 2021-04-27 DIAGNOSIS — M47.816 SPONDYLOSIS OF LUMBAR REGION WITHOUT MYELOPATHY OR RADICULOPATHY: ICD-10-CM

## 2021-04-27 DIAGNOSIS — F32.A ANXIETY AND DEPRESSION: ICD-10-CM

## 2021-04-27 DIAGNOSIS — M48.062 LUMBAR STENOSIS WITH NEUROGENIC CLAUDICATION: ICD-10-CM

## 2021-04-27 DIAGNOSIS — M51.37 DEGENERATION OF LUMBAR OR LUMBOSACRAL INTERVERTEBRAL DISC: ICD-10-CM

## 2021-04-27 DIAGNOSIS — M48.062 LUMBAR STENOSIS WITH NEUROGENIC CLAUDICATION: Primary | ICD-10-CM

## 2021-04-27 DIAGNOSIS — F41.9 ANXIETY AND DEPRESSION: ICD-10-CM

## 2021-04-27 PROCEDURE — 99213 OFFICE O/P EST LOW 20 MIN: CPT | Performed by: NURSE PRACTITIONER

## 2021-04-27 RX ORDER — GABAPENTIN 100 MG/1
CAPSULE ORAL
Qty: 90 CAPSULE | Refills: 0 | Status: SHIPPED | OUTPATIENT
Start: 2021-04-27 | End: 2021-05-26

## 2021-05-11 ENCOUNTER — TELEPHONE (OUTPATIENT)
Dept: PAIN MEDICINE | Facility: CLINIC | Age: 51
End: 2021-05-11

## 2021-05-14 ENCOUNTER — APPOINTMENT (OUTPATIENT)
Dept: PREADMISSION TESTING | Facility: HOSPITAL | Age: 51
End: 2021-05-14

## 2021-05-17 ENCOUNTER — DOCUMENTATION (OUTPATIENT)
Dept: PHYSICAL THERAPY | Facility: HOSPITAL | Age: 51
End: 2021-05-17

## 2021-05-17 ENCOUNTER — OUTSIDE FACILITY SERVICE (OUTPATIENT)
Dept: PAIN MEDICINE | Facility: CLINIC | Age: 51
End: 2021-05-17

## 2021-05-17 DIAGNOSIS — G89.29 CHRONIC BILATERAL LOW BACK PAIN WITH BILATERAL SCIATICA: Primary | ICD-10-CM

## 2021-05-17 DIAGNOSIS — M54.42 CHRONIC BILATERAL LOW BACK PAIN WITH BILATERAL SCIATICA: Primary | ICD-10-CM

## 2021-05-17 DIAGNOSIS — M54.41 CHRONIC BILATERAL LOW BACK PAIN WITH BILATERAL SCIATICA: Primary | ICD-10-CM

## 2021-05-17 PROCEDURE — 99152 MOD SED SAME PHYS/QHP 5/>YRS: CPT | Performed by: ANESTHESIOLOGY

## 2021-05-17 PROCEDURE — 64483 NJX AA&/STRD TFRM EPI L/S 1: CPT | Performed by: ANESTHESIOLOGY

## 2021-05-17 NOTE — THERAPY DISCHARGE NOTE
Outpatient Physical Therapy Discharge Summary         Patient Name: Nita Mcmahon  : 1970  MRN: [id number]    Today's Date: 2021    Visit Dx:    ICD-10-CM ICD-9-CM   1. Chronic bilateral low back pain with bilateral sciatica  M54.42 724.2    M54.41 724.3    G89.29 338.29           OP PT Discharge Summary  Date of Discharge: 21  Reason for Discharge: Patient/Caregiver request  Outcomes Achieved: Other (unable to assess)  Discharge Instructions/Additional Comments: Patient was seen for initial eval and 3 follow-up visits in February.  she cancelled remaining appointments as she was going to transfer to [facility] program.        Callie Moore, PT  2021

## 2021-05-18 ENCOUNTER — TELEPHONE (OUTPATIENT)
Dept: PAIN MEDICINE | Facility: CLINIC | Age: 51
End: 2021-05-18

## 2021-05-18 NOTE — TELEPHONE ENCOUNTER
Called and spoke with pt regarding yesterdays procedure yesterday with Dr. Chandra and she stated that she feels great. Starts physical therapy tomorrow. Advised of follow up appointment of 7/19/2021 @ 830 am

## 2021-05-25 DIAGNOSIS — M48.062 LUMBAR STENOSIS WITH NEUROGENIC CLAUDICATION: ICD-10-CM

## 2021-05-26 RX ORDER — GABAPENTIN 100 MG/1
CAPSULE ORAL
Qty: 90 CAPSULE | Refills: 1 | Status: SHIPPED | OUTPATIENT
Start: 2021-05-26 | End: 2021-09-28

## 2021-06-28 ENCOUNTER — TELEPHONE (OUTPATIENT)
Dept: PAIN MEDICINE | Facility: CLINIC | Age: 51
End: 2021-06-28

## 2021-06-28 NOTE — TELEPHONE ENCOUNTER
Caller: JR SANFORD    Relationship: SELF    Best call back number: [phone]    What is the best time to reach you: ANY    What was the call regarding: PT RECEIVED STEROID SPINAL INJECTION ON 5/17/2021. PT BEGAN TO START HAVING PAIN AGAIN ON 6/12. SHE HAS AN APPT ON 7/19 WITH JEREL CHAVEZ. SHE WOULD LIKE TO BE SEEN SOONER OR TO DISCUSS WHAT HER NEXT STEPS WILL BE.     Do you require a callback: YES

## 2021-06-28 NOTE — TELEPHONE ENCOUNTER
Spoke with pt regarding getting a sooner appointment. Advised pt that we have nothing any sooner that we are completely booked out. Pt had some questions and I advised they would be addressed at her follow up 7/19/21. Pt stated keeping appointment..

## 2021-07-15 NOTE — PROGRESS NOTES
"Chief Complaint: \"I am still having a lot pain.\"      History of Present Illness:   Patient: Ms. Nita Mcmahon, 51 y.o. female originally referred by Dr. Primo Chavez in consultation for chronic intractable lower back pain. Patient reports a 6-year history of lower back pain, which began without incident.  We last saw her on May 17, 2021, when she underwent repeat bilateral L4-L5 transforaminal epidural steroid injections from which she reports experiencing some transient pain relief lasting one month.  She reports progressive recurrence of her pain to previous levels.  Prior to that on March 24, 2021, she underwent diagnostic and therapeutic bilateral L4-L5 transforaminal epidural steroid injections, that provided her with 100% pain relief and functional improvement lasting 1 1/2 weeks.  She has continued physical therapy.  She remains on gabapentin 100 mg 3 times daily, from which she reports benefit. She returns today for post procedure follow-up and evaluation.  Pain Description: Constant pain with intermittent exacerbation, described as aching, shocking and burning sensation.   Radiation of Pain: The pain radiates into the sacroiliac joint regions and down the lower extremities down to the calves  Pain intensity today: 6/10  Average pain intensity last week: 10/10  Pain intensity ranges from: 7/10 to 10/10  Aggravating factors: Pain increases with bending, twisting, standing, walking. Patient describes neurogenic claudication.    Alleviating factors: Pain decreases with sitting down, lying down   Associated Symptoms:   Patient reports pain, numbness, tingling, and weakness in the lower extremities   Patient denies any new bladder or bowel problems.   Patient reports difficulties with her balance but denies recent falls.     Review of previous therapies and additional medical records:  Nita Mcmahon has already failed the following measures, including:   Conservative Measures: Oral analgesics, " topical analgesics, ice, heat, chiropractic therapy,massage, physical therapy   Interventional Measures: 03/24/2021: diagnostic and therapeutic bilateral L4-L5 transforaminal epidural steroid injections   05/17/2021: bilateral L4-L5 transforaminal epidural steroid injections   Surgical Measures: No history of previous lumbar spine or hip surgery   Nita Mcmahon underwent neurosurgical consultation with Dr. Primo Chavez on 01/27/2021, and was found to be a potential surgical candidate for PLIF L4-L5.  Nita Mcmahon presents with significant comorbidities including IBS, asthma, anxiety and depression, moderate obesity  In terms of current analgesics, Nita Mcmahon takes: Meloxicam, cyclobenzaprine. Patient also takes venlafaxine  I have reviewed Isaiah Report #[id number] consistent with medication reconciliation.  SOAPP: Low Risk    Global Pain Scale 03-09 2021 04-27 2021 07-19 2021        Pain 22 20 23        Feelings 15 6 19        Clinical outcomes 25 22 20        Activities 25 25 23        GPS Total: 87 73 85          Review of Diagnostic Studies:  EMG/NCV of the bilateral lower extremities 3/31/2021: Possible bilateral S1 radiculopathies  X-ray lumbar spine with flexion-extension views 3/20/2021: Grade 1 anterolisthesis of L4 on L5, with minimal instability.  MRI of the lumbar spine without contrast January 11, 2021: Images revealed grade 1 anterolisthesis of L4 on L5.  Spinal cord and cauda equina image normal.  Axial imaging:  L1-L2: Broad-based disc bulge, mild ligamentum flavum hypertrophy, facet hypertrophy contributing to mild spinal canal stenosis.  No significant neuroforaminal stenosis  L2-L3: Moderate circumferential disc bulge, moderate to severe ligamentum flavum hypertrophy, moderate facet hypertrophy contributing to moderate canal stenosis and mild bilateral neuroforaminal stenosis  L3-L4: Moderate circumferential disc bulge, severe ligamentum flavum hypertrophy, moderate facet  hypertrophy contributing to moderate left paracentral spinal canal stenosis with mild bilateral neuroforaminal stenosis  L4-L5: Large circumferential disc bulge with extension into the lateral regions combined with severe ligamentum flavum hypertrophy, moderate facet hypertrophy to produce severe left paracentral spinal canal stenosis with moderate to severe right and moderate left neuroforaminal stenosis  L5-S1: Circumferential disc bulge, my ligamentum flavum hypertrophy and facet hypertrophy contributing to mild canal stenosis.  No significant foraminal stenosis    Review of Systems   Musculoskeletal: Positive for back pain.   Neurological: Positive for weakness and numbness.   Psychiatric/Behavioral: The patient is nervous/anxious.    All other systems reviewed and are negative.        Patient Active Problem List   Diagnosis   • Irritable bowel syndrome   • Sciatica   • Arthritis   • Asthma   • Anxiety and depression   • Class 2 obesity due to excess calories without serious comorbidity in adult   • Lumbar stenosis with neurogenic claudication   • Spondylosis of lumbar region without myelopathy or radiculopathy   • Spondylolisthesis, lumbar region   • Degeneration of lumbar or lumbosacral intervertebral disc       Past Medical History:   Diagnosis Date   • History of esophageal reflux    • Muscle spasm of back          Past Surgical History:   Procedure Laterality Date   • APPENDECTOMY     •  SECTION     • HERNIA REPAIR     • HYSTERECTOMY           Family History   Problem Relation Age of Onset   • Arthritis Other    • Diabetes Other    • Hyperlipidemia Other    • Obesity Other    • Mental illness Other    • Heart attack Other          Social History     Socioeconomic History   • Marital status:      Spouse name: Not on file   • Number of children: Not on file   • Years of education: Not on file   • Highest education level: Not on file   Tobacco Use   • Smoking status: Never Smoker   • Smokeless  "tobacco: Never Used   Substance and Sexual Activity   • Alcohol use: No   • Drug use: No   • Sexual activity: Yes     Partners: Male           Current Outpatient Medications:   •  albuterol (PROAIR HFA) 108 (90 BASE) MCG/ACT inhaler, ProAir  (90 Base) MCG/ACT Inhalation Aerosol Solution; Patient Sig: ProAir  (90 Base) MCG/ACT Inhalation Aerosol Solution INHALE 1 TO 2 PUFFS EVERY 4 TO 6 HOURS AS NEEDED.; 1; 1; 28-Jan-2016; Active, Disp: , Rfl:   •  estradiol (ESTRACE) 1 MG tablet, TAKE 1 TABLET BY MOUTH EVERY DAY, Disp: 90 tablet, Rfl: 2  •  gabapentin (NEURONTIN) 100 MG capsule, TAKE ONE CAPSULE BY MOUTH THREE TIMES DAILY ., Disp: 90 capsule, Rfl: 1  •  meloxicam (MOBIC) 15 MG tablet, Take 1 tablet by mouth Daily., Disp: 90 tablet, Rfl: 3  •  venlafaxine XR (EFFEXOR-XR) 75 MG 24 hr capsule, Take 1 capsule by mouth Daily., Disp: 90 capsule, Rfl: 3  •  vitamin B-6 (PYRIDOXINE) 100 MG tablet, Take 1 tablet by mouth Daily., Disp: 90 tablet, Rfl: 0  •  cyclobenzaprine (FLEXERIL) 10 MG tablet, Take 1 tablet by mouth 3 (Three) Times a Day As Needed for Muscle Spasms., Disp: 21 tablet, Rfl: 0  •  Dietary Management Product (Rheumate) capsule, Take 1 capsule by mouth Daily., Disp: 90 capsule, Rfl: 1      Allergies   Allergen Reactions   • Avelox  [Moxifloxacin Hcl] Anaphylaxis   • Avelox [Moxifloxacin]    • Morphine Itching         /91 (BP Location: Left arm, Patient Position: Sitting, Cuff Size: Adult)   Pulse 103   Temp 94.1 °F (34.5 °C)   Ht 157.5 cm (62\")   Wt 88.8 kg (195 lb 12.8 oz)   SpO2 97%   BMI 35.81 kg/m²       Physical Exam:  Constitutional: Patient appears well-developed, well-nourished, well-hydrated  HEENT: Head: Normocephalic and atraumatic  Eyes: Conjunctivae and lids are normal  Pupils: Equal, round, reactive to light  Neck: Trachea normal. Neck supple. No JVD present.   Pulmonary: No increased work of breathing or signs of respiratory distress. Auscultation of lungs: Clear to " auscultation.   Cardiovascular: Normal rate and rhythm, normal S1 and S2, no murmurs.   Peripheral vascular exam: Femoral: right 2+, left 2+. Posterior tibialis: right 2+ and left 2+. Dorsalis pedis: right 2+ and left 2+. No edema.   Gait and station: Gait evaluation demonstrated a normal gait. Able to walk on heels, toes, tandem walking   Lumbar spine: Passive and active range of motion are limited secondary to pain. Extension, lateral flexion, rotation of the lumbar spine increased and reproduced pain (gluteal and posterior leg pain). Lumbar facet joint loading maneuvers are equivocal  Maxi test, Gaenslen's test, thigh thrust test, SI compression test are negative   Piriformis maneuvers are negative   Palpation of the bilateral greater trochanter, unrevealing   Examination of the Iliotibial band: unrevealing   Hip joints: The range of motion of the hip joints is almost full and without pain   Neurological:   Patient is alert and oriented to person, place, and time.   Speech: Normal.   Cortical function: Normal mental status.   Cranial nerves 2-12: intact.   Reflex Scores:  Right patellar: 2+  Left patellar: 2+  Right Achilles: 2+  Left Achilles: 2+  Motor strength: 5/5  Motor Tone: Normal .   Involuntary movements: None.   Superficial/Primitive Reflexes: Primitive reflexes were absent.   Right Hebert: Absent  Left Hebert: Absent  Right ankle clonus: Absent  Left ankle clonus: Absent   Babinsky: Absent  Long tract signs: Negative. Straight leg raising test: Negative.   Sensory exam: Intact to light touch, intact pain and temperature sensation, intact vibration sensation and normal proprioception.   Coordination: Normal finger to nose and heel to shin. Normal balance and negative Romberg's sign   Skin and subcutaneous tissue: Skin is warm and intact. No rash noted. No cyanosis.   Psychiatric: Judgment and insight: Normal. Orientation to person, place and time: Normal. Recent and remote memory: Intact. Mood and  affect: Normal.    I have reviewed the physical exam dated 04/27/2021. Upon examination of the patient today, there are no changes noted.           ASSESSMENT:   1. Lumbar stenosis with neurogenic claudication    2. Spondylolisthesis, lumbar region    3. Degeneration of lumbar or lumbosacral intervertebral disc    4. Spondylosis of lumbar region without myelopathy or radiculopathy    5. Anxiety and depression    6. Class 2 obesity due to excess calories without serious comorbidity with body mass index (BMI) of 35.0 to 35.9 in adult          PLAN/MEDICAL DECISION MAKING:  Patient reports a 6-year history of lower back pain. Patient has failed to obtain pain relief with conservative measures including analgesics, chiropractic therapy, physical therapy. MRI of the lumbar spine without contrast from January 11, 2021:  Revealed grade 1 anterolisthesis of L4 on L5. Multilevel lumbar spondylosis with varying degrees of disc bulges, facet hypertrophy, and ligamentum flavum hypertrophy contributing to various degrees of canal and neuroforaminal stenosis. L4-L5: Large circumferential disc bulge with extension into the lateral regions combined with severe ligamentum flavum hypertrophy, moderate facet hypertrophy to produce severe left paracentral spinal canal stenosis with moderate to severe right and moderate left neuroforaminal stenosis which provides clinical and radiological correlation.  EMG/NCV of the bilateral lower extremities revealed bilateral S1 radiculopathies.  X-ray of the lumbar spine with flexion-extension views revealed a grade 1 anterolisthesis of L4 on L5 with minimal instability seen. Nita Mcmahon underwent neurosurgical consultation with Dr. Primo Chavez on 01/27/2021, and was found to be a potential surgical candidate for PLIF L4-L5 if failure to obtain pain relief with conservative and interventional pain management measures.  Unfortunately, Mrs. Mcmahon continues to struggle significantly  with her lower back and lower extremity symptoms.  At this point, she has failed two epidurals, and additional physical therapy.  She is going to follow-up with Dr. Chavez for further recommendations.  I have reviewed all available patient's medical records as well as previous therapies as referenced above. I had a lengthy conversation with Ms. Nita Mcmahon regarding her chronic pain condition and potential therapeutic options including risks, benefits, alternative therapies, to name a few. Therefore, I have proposed the following plan:  1. Pharmacological measures: Reviewed and discussed;   A. Patient takes meloxicam, cyclobenzaprine. Patient also takes venlafaxine  B. Continue gabapentin 100 mg three times per day.    C. Continue Rheumate one tablet twice daily  D. Continue pyridoxine 100 mg one tablet by mouth daily  2. Interventional pain management measures: None indicated this time.  Patient is gone to follow-up with Dr. Chavez for further recommendations.  3. Long-term rehabilitation efforts:  A. The patient does not have a history of falls. I did complete a risk assessment for falls  B. Patient will continue a comprehensive physical therapy program at [facility] for reconditioning, therapeutic exercise, core strengthening, gait and balance training, neurodynamics, myofascial release, cupping and dry needling   C. Start an exercise program such as water therapy  D. Contrast therapy: Apply ice-packs for 15-20 minutes, followed by heating pads for 15-20 minutes to affected area   4. The patient has been instructed to contact my office with any questions or difficulties. The patient understands the plan and agrees to proceed accordingly.        Patient Care Team:  Mary Dixon MD as PCP - General     No orders of the defined types were placed in this encounter.        Future Appointments   Date Time Provider Department Center   9/13/2021  8:15 AM Mary Dixon MD [facility]          TONY Sullivan/Transcription disclaimer:  Much of this encounter note is an electronic transcription of spoken language to printed text. Electronic transcription of spoken language may permit erroneous, or at times, nonsensical words or phrases to be inadvertently transcribed. Although I have reviewed the note for such errors, some may still exist.

## 2021-07-19 ENCOUNTER — OFFICE VISIT (OUTPATIENT)
Dept: PAIN MEDICINE | Facility: CLINIC | Age: 51
End: 2021-07-19

## 2021-07-19 VITALS
TEMPERATURE: 94.1 F | DIASTOLIC BLOOD PRESSURE: 91 MMHG | WEIGHT: 195.8 LBS | SYSTOLIC BLOOD PRESSURE: 141 MMHG | HEIGHT: 62 IN | OXYGEN SATURATION: 97 % | HEART RATE: 103 BPM | BODY MASS INDEX: 36.03 KG/M2

## 2021-07-19 DIAGNOSIS — M43.16 SPONDYLOLISTHESIS, LUMBAR REGION: ICD-10-CM

## 2021-07-19 DIAGNOSIS — M48.062 LUMBAR STENOSIS WITH NEUROGENIC CLAUDICATION: ICD-10-CM

## 2021-07-19 DIAGNOSIS — F32.A ANXIETY AND DEPRESSION: ICD-10-CM

## 2021-07-19 DIAGNOSIS — M51.37 DEGENERATION OF LUMBAR OR LUMBOSACRAL INTERVERTEBRAL DISC: ICD-10-CM

## 2021-07-19 DIAGNOSIS — F41.9 ANXIETY AND DEPRESSION: ICD-10-CM

## 2021-07-19 DIAGNOSIS — E66.09 CLASS 2 OBESITY DUE TO EXCESS CALORIES WITHOUT SERIOUS COMORBIDITY WITH BODY MASS INDEX (BMI) OF 35.0 TO 35.9 IN ADULT: ICD-10-CM

## 2021-07-19 DIAGNOSIS — M47.816 SPONDYLOSIS OF LUMBAR REGION WITHOUT MYELOPATHY OR RADICULOPATHY: ICD-10-CM

## 2021-07-19 PROCEDURE — 99213 OFFICE O/P EST LOW 20 MIN: CPT | Performed by: NURSE PRACTITIONER

## 2021-08-03 RX ORDER — ESTRADIOL 1 MG/1
TABLET ORAL
Qty: 90 TABLET | Refills: 2 | Status: SHIPPED | OUTPATIENT
Start: 2021-08-03 | End: 2022-02-08

## 2021-08-04 ENCOUNTER — OFFICE VISIT (OUTPATIENT)
Dept: NEUROSURGERY | Facility: CLINIC | Age: 51
End: 2021-08-04

## 2021-08-04 VITALS
DIASTOLIC BLOOD PRESSURE: 82 MMHG | BODY MASS INDEX: 36.18 KG/M2 | HEIGHT: 62 IN | WEIGHT: 196.6 LBS | TEMPERATURE: 96.8 F | SYSTOLIC BLOOD PRESSURE: 116 MMHG

## 2021-08-04 DIAGNOSIS — M43.16 SPONDYLOLISTHESIS, LUMBAR REGION: ICD-10-CM

## 2021-08-04 DIAGNOSIS — E66.09 CLASS 2 OBESITY DUE TO EXCESS CALORIES WITHOUT SERIOUS COMORBIDITY WITH BODY MASS INDEX (BMI) OF 35.0 TO 35.9 IN ADULT: ICD-10-CM

## 2021-08-04 DIAGNOSIS — M54.17 LUMBOSACRAL RADICULOPATHY: Primary | ICD-10-CM

## 2021-08-04 PROCEDURE — 99214 OFFICE O/P EST MOD 30 MIN: CPT | Performed by: NEUROLOGICAL SURGERY

## 2021-08-09 ENCOUNTER — PREP FOR SURGERY (OUTPATIENT)
Dept: OTHER | Facility: HOSPITAL | Age: 51
End: 2021-08-09

## 2021-08-09 ENCOUNTER — TELEPHONE (OUTPATIENT)
Dept: NEUROSURGERY | Facility: CLINIC | Age: 51
End: 2021-08-09

## 2021-08-09 DIAGNOSIS — M48.062 LUMBAR STENOSIS WITH NEUROGENIC CLAUDICATION: Primary | ICD-10-CM

## 2021-08-09 DIAGNOSIS — E66.09 CLASS 2 OBESITY DUE TO EXCESS CALORIES WITHOUT SERIOUS COMORBIDITY WITH BODY MASS INDEX (BMI) OF 35.0 TO 35.9 IN ADULT: ICD-10-CM

## 2021-08-09 RX ORDER — ACETAMINOPHEN 500 MG
1000 TABLET ORAL ONCE
Status: CANCELLED | OUTPATIENT
Start: 2021-08-09 | End: 2021-08-09

## 2021-08-09 RX ORDER — SODIUM CHLORIDE 0.9 % (FLUSH) 0.9 %
10 SYRINGE (ML) INJECTION EVERY 12 HOURS SCHEDULED
Status: CANCELLED | OUTPATIENT
Start: 2021-08-09

## 2021-08-09 RX ORDER — OXYCODONE HCL 10 MG/1
10 TABLET, FILM COATED, EXTENDED RELEASE ORAL ONCE
Status: CANCELLED | OUTPATIENT
Start: 2021-08-09 | End: 2021-08-09

## 2021-08-09 RX ORDER — SODIUM CHLORIDE 0.9 % (FLUSH) 0.9 %
10 SYRINGE (ML) INJECTION AS NEEDED
Status: CANCELLED | OUTPATIENT
Start: 2021-08-09

## 2021-08-09 RX ORDER — IBUPROFEN 800 MG/1
800 TABLET ORAL ONCE
Status: CANCELLED | OUTPATIENT
Start: 2021-08-09 | End: 2021-08-09

## 2021-08-09 RX ORDER — CEFAZOLIN SODIUM 2 G/100ML
2 INJECTION, SOLUTION INTRAVENOUS ONCE
Status: CANCELLED | OUTPATIENT
Start: 2021-08-09 | End: 2021-08-09

## 2021-08-09 RX ORDER — CHLORHEXIDINE GLUCONATE 4 G/100ML
SOLUTION TOPICAL
Qty: 120 ML | Refills: 0 | Status: SHIPPED | OUTPATIENT
Start: 2021-08-09 | End: 2021-10-20 | Stop reason: HOSPADM

## 2021-08-09 RX ORDER — PREGABALIN 150 MG/1
150 CAPSULE ORAL ONCE
Status: CANCELLED | OUTPATIENT
Start: 2021-08-09 | End: 2021-08-09

## 2021-08-09 NOTE — TELEPHONE ENCOUNTER
Office Visit with Primo Chavez MD (08/04/2021)        I discussed the risks and benefits of an L4-5 PLIF once again with the patient.  I think the surgery is likely to help her quite a bit with her leg pain and probably to some degree with her back pain given the dynamic nature of her spondylolisthesis.  A lengthy and protracted conversation was held with her regarding the risks and benefits.  She inquired about doing a simple decompression/laminectomy at L4.  I advised her that the reoperation rate with a surgery like this without spinal stabilization is very high.  I did advise her that she does have some early degenerative disc disease at L3-4 and I counseled her about the potential development of adjacent level disease.  She would like some time to consider her options and I think this is reasonable.  She also is considering getting a second opinion, so I did provide her with the names of some reputable surgeons here in town so she can get a second opinion.  She will call me if she would like to schedule her L4-5 PLIF.        Patient wants to schedule surgery.

## 2021-08-13 RX ORDER — VENLAFAXINE HYDROCHLORIDE 37.5 MG/1
CAPSULE, EXTENDED RELEASE ORAL
Qty: 90 CAPSULE | Refills: 1 | Status: SHIPPED | OUTPATIENT
Start: 2021-08-13 | End: 2021-09-13

## 2021-09-13 ENCOUNTER — OFFICE VISIT (OUTPATIENT)
Dept: INTERNAL MEDICINE | Facility: CLINIC | Age: 51
End: 2021-09-13

## 2021-09-13 VITALS
BODY MASS INDEX: 35.15 KG/M2 | DIASTOLIC BLOOD PRESSURE: 84 MMHG | RESPIRATION RATE: 18 BRPM | SYSTOLIC BLOOD PRESSURE: 126 MMHG | WEIGHT: 191 LBS | HEART RATE: 86 BPM | OXYGEN SATURATION: 98 % | HEIGHT: 62 IN

## 2021-09-13 DIAGNOSIS — Z13.220 LIPID SCREENING: ICD-10-CM

## 2021-09-13 DIAGNOSIS — F32.A ANXIETY AND DEPRESSION: Primary | ICD-10-CM

## 2021-09-13 DIAGNOSIS — F41.9 ANXIETY AND DEPRESSION: Primary | ICD-10-CM

## 2021-09-13 DIAGNOSIS — J45.20 MILD INTERMITTENT ASTHMA WITHOUT COMPLICATION: ICD-10-CM

## 2021-09-13 DIAGNOSIS — M51.37 DEGENERATION OF LUMBAR OR LUMBOSACRAL INTERVERTEBRAL DISC: ICD-10-CM

## 2021-09-13 PROCEDURE — 99214 OFFICE O/P EST MOD 30 MIN: CPT | Performed by: INTERNAL MEDICINE

## 2021-09-13 RX ORDER — ALBUTEROL SULFATE 90 UG/1
1 AEROSOL, METERED RESPIRATORY (INHALATION)
Qty: 18 G | Refills: 11 | Status: SHIPPED | OUTPATIENT
Start: 2021-09-13

## 2021-09-13 RX ORDER — VENLAFAXINE HYDROCHLORIDE 75 MG/1
75 CAPSULE, EXTENDED RELEASE ORAL DAILY
Qty: 90 CAPSULE | Refills: 3 | Status: SHIPPED | OUTPATIENT
Start: 2021-09-13 | End: 2021-11-24

## 2021-09-13 RX ORDER — LEVOCETIRIZINE DIHYDROCHLORIDE 5 MG/1
5 TABLET, FILM COATED ORAL EVERY EVENING
COMMUNITY

## 2021-09-13 NOTE — PROGRESS NOTES
"Subjective   Nita Mcmahon is a 51 y.o. female here for follow-up anxiety and depression, asthma, chronic low back pain.  Patient says overall she has been feeling well.  She is going to have lumbar fusion the 20th of this month with Dr. Chavez.  She is looking forward to it.  She has significant pain in the low back and she is hopeful that this will help her.  She had been looking at a laminectomy, but Dr. Chavez did not want to do that as he said that she would be back to see him in 2 months getting this procedure done.  Taking gabapentin 100 mg twice daily prescribed by neurosurgery.  She says mood has been improved, thinks the Effexor is doing its job.  Has to use albuterol intermittently for asthma.    I have reviewed the following portions of the patient's history and confirmed they are accurate: current medications, past medical history, past social history and problem list     I have personally reviewed and performed the ROS. Mary Dixon MD     Review of Systems:  General: negative  Neuro: negative  Psych: negative  MSK: Back pain    Objective   /84   Pulse 86   Resp 18   Ht 157.5 cm (62\")   Wt 86.6 kg (191 lb)   SpO2 98%   BMI 34.93 kg/m²     Physical Exam  Vitals reviewed.   Constitutional:       Appearance: She is well-developed.   Pulmonary:      Effort: Pulmonary effort is normal.   Skin:     General: Skin is warm and dry.   Neurological:      Mental Status: She is alert and oriented to person, place, and time.   Psychiatric:         Behavior: Behavior normal.         Thought Content: Thought content normal.         Judgment: Judgment normal.       Assessment/Plan   Diagnoses and all orders for this visit:    1. Anxiety and depression (Primary)  -     venlafaxine XR (EFFEXOR-XR) 75 MG 24 hr capsule; Take 1 capsule by mouth Daily.  Dispense: 90 capsule; Refill: 3  -Chronic improved    2. Mild intermittent asthma without complication  -     albuterol sulfate HFA (ProAir HFA) " 108 (90 Base) MCG/ACT inhaler; Inhale 1 puff 4 (Four) Times a Day.  Dispense: 18 g; Refill: 11  -Chronic stable    3. Lipid screening  -     Lipid Panel; Future    4. Degeneration of lumbar or lumbosacral intervertebral disc  -Has lumbar fusion scheduled with Dr. Chavez, will continue gabapentin until then  -Reviewed neurosurgery note, imaging           Mary Dixon MD    Please note that portions of this note were completed with a voice recognition program. Efforts were made to edit the dictations, but occasionally words are mistranscribed.

## 2021-09-21 ENCOUNTER — APPOINTMENT (OUTPATIENT)
Dept: PREADMISSION TESTING | Facility: HOSPITAL | Age: 51
End: 2021-09-21

## 2021-09-28 DIAGNOSIS — M48.062 LUMBAR STENOSIS WITH NEUROGENIC CLAUDICATION: ICD-10-CM

## 2021-09-28 RX ORDER — GABAPENTIN 100 MG/1
100 CAPSULE ORAL 2 TIMES DAILY
Qty: 60 CAPSULE | Refills: 0 | Status: SHIPPED | OUTPATIENT
Start: 2021-09-28 | End: 2021-11-24

## 2021-09-30 ENCOUNTER — TELEPHONE (OUTPATIENT)
Dept: NEUROSURGERY | Facility: CLINIC | Age: 51
End: 2021-09-30

## 2021-09-30 DIAGNOSIS — M48.062 LUMBAR STENOSIS WITH NEUROGENIC CLAUDICATION: Primary | ICD-10-CM

## 2021-09-30 NOTE — TELEPHONE ENCOUNTER
Ms. Mcmahon is scheduled for surgery on October 19 and is also scheduled for her COVID booster on October 11.  She would like a call from clinical to let her know if this ok or should she wait to have the booster after recovery from surgery.  Tuan    [phone]

## 2021-10-15 ENCOUNTER — PRE-ADMISSION TESTING (OUTPATIENT)
Dept: PREADMISSION TESTING | Facility: HOSPITAL | Age: 51
End: 2021-10-15

## 2021-10-15 VITALS — WEIGHT: 192.68 LBS | BODY MASS INDEX: 35.46 KG/M2 | HEIGHT: 62 IN

## 2021-10-15 DIAGNOSIS — E66.09 CLASS 2 OBESITY DUE TO EXCESS CALORIES WITHOUT SERIOUS COMORBIDITY WITH BODY MASS INDEX (BMI) OF 35.0 TO 35.9 IN ADULT: ICD-10-CM

## 2021-10-15 DIAGNOSIS — M48.062 LUMBAR STENOSIS WITH NEUROGENIC CLAUDICATION: ICD-10-CM

## 2021-10-15 DIAGNOSIS — M48.062 LUMBAR STENOSIS WITH NEUROGENIC CLAUDICATION: Primary | ICD-10-CM

## 2021-10-15 LAB
ANION GAP SERPL CALCULATED.3IONS-SCNC: 12 MMOL/L (ref 5–15)
BILIRUB UR QL STRIP: NEGATIVE
BUN SERPL-MCNC: 10 MG/DL (ref 6–20)
BUN/CREAT SERPL: 14.5 (ref 7–25)
CALCIUM SPEC-SCNC: 9.3 MG/DL (ref 8.6–10.5)
CHLORIDE SERPL-SCNC: 101 MMOL/L (ref 98–107)
CLARITY UR: CLEAR
CO2 SERPL-SCNC: 25 MMOL/L (ref 22–29)
COLOR UR: YELLOW
CREAT SERPL-MCNC: 0.69 MG/DL (ref 0.57–1)
DEPRECATED RDW RBC AUTO: 39.8 FL (ref 37–54)
ERYTHROCYTE [DISTWIDTH] IN BLOOD BY AUTOMATED COUNT: 12.1 % (ref 12.3–15.4)
GFR SERPL CREATININE-BSD FRML MDRD: 89 ML/MIN/1.73
GLUCOSE SERPL-MCNC: 101 MG/DL (ref 65–99)
GLUCOSE UR STRIP-MCNC: NEGATIVE MG/DL
HBA1C MFR BLD: 5.4 % (ref 4.8–5.6)
HCT VFR BLD AUTO: 41 % (ref 34–46.6)
HGB BLD-MCNC: 14 G/DL (ref 12–15.9)
HGB UR QL STRIP.AUTO: NEGATIVE
KETONES UR QL STRIP: NEGATIVE
LEUKOCYTE ESTERASE UR QL STRIP.AUTO: NEGATIVE
MCH RBC QN AUTO: 30.7 PG (ref 26.6–33)
MCHC RBC AUTO-ENTMCNC: 34.1 G/DL (ref 31.5–35.7)
MCV RBC AUTO: 89.9 FL (ref 79–97)
NITRITE UR QL STRIP: NEGATIVE
PH UR STRIP.AUTO: 6.5 [PH] (ref 5–8)
PLATELET # BLD AUTO: 346 10*3/MM3 (ref 140–450)
PMV BLD AUTO: 9.5 FL (ref 6–12)
POTASSIUM SERPL-SCNC: 4.2 MMOL/L (ref 3.5–5.2)
PROT UR QL STRIP: NEGATIVE
QT INTERVAL: 364 MS
QTC INTERVAL: 422 MS
RBC # BLD AUTO: 4.56 10*6/MM3 (ref 3.77–5.28)
SODIUM SERPL-SCNC: 138 MMOL/L (ref 136–145)
SP GR UR STRIP: <=1.005 (ref 1–1.03)
UROBILINOGEN UR QL STRIP: NORMAL
WBC # BLD AUTO: 7.25 10*3/MM3 (ref 3.4–10.8)

## 2021-10-15 PROCEDURE — 87081 CULTURE SCREEN ONLY: CPT

## 2021-10-15 PROCEDURE — 83036 HEMOGLOBIN GLYCOSYLATED A1C: CPT

## 2021-10-15 PROCEDURE — 80048 BASIC METABOLIC PNL TOTAL CA: CPT

## 2021-10-15 PROCEDURE — 81003 URINALYSIS AUTO W/O SCOPE: CPT

## 2021-10-15 PROCEDURE — 85027 COMPLETE CBC AUTOMATED: CPT

## 2021-10-15 PROCEDURE — 93010 ELECTROCARDIOGRAM REPORT: CPT | Performed by: INTERNAL MEDICINE

## 2021-10-15 PROCEDURE — 93005 ELECTROCARDIOGRAM TRACING: CPT

## 2021-10-15 PROCEDURE — 36415 COLL VENOUS BLD VENIPUNCTURE: CPT

## 2021-10-15 RX ORDER — MULTIPLE VITAMINS W/ MINERALS TAB 9MG-400MCG
1 TAB ORAL DAILY
COMMUNITY

## 2021-10-15 RX ORDER — DIPHENHYDRAMINE HCL 25 MG
25 CAPSULE ORAL NIGHTLY
COMMUNITY
End: 2022-01-26

## 2021-10-15 RX ORDER — FLUTICASONE PROPIONATE 50 MCG
2 SPRAY, SUSPENSION (ML) NASAL NIGHTLY PRN
COMMUNITY

## 2021-10-15 NOTE — PAT
An arrival time for procedure was not given during PAT visit. If patient had any questions or concerns about their arrival time, they were instructed to contact their surgeon/physician.  Additionally, if the patient referred to an arrival time that was acquired from their my chart account, patient was encouraged to verify that time with their surgeon/physician.  NO arrival times given in Pre Admission Testing Department.    Patient to apply Chlorhexadine wipes  to surgical area (as instructed) the night before procedure and the AM of procedure. Wipes provided.    Patient instructed to drink 20 ounces (or until full) of Gatorade and it needs to be completed 1 hour (for Main OR patients) or 2 hours (scheduled  section patients) before given arrival time for procedure (NO RED Gatorade)    Patient verbalized understanding.    Chlorhexidine Prescription prescribed by physician before PAT visit.  Verified with patient that medication was picked up from their pharmacy.  Written instructions given to patient during PAT visit.  Patient/family also instructed to complete skin prep checklist and return the checklist on the day of surgery to preoperative staff.  Patient/family verbalized understanding.    Pt scheduled for COVID test on 10/17/21.

## 2021-10-17 ENCOUNTER — APPOINTMENT (OUTPATIENT)
Dept: PREADMISSION TESTING | Facility: HOSPITAL | Age: 51
End: 2021-10-17

## 2021-10-17 DIAGNOSIS — M48.062 LUMBAR STENOSIS WITH NEUROGENIC CLAUDICATION: ICD-10-CM

## 2021-10-17 LAB
MRSA SPEC QL CULT: NORMAL
SARS-COV-2 RNA PNL SPEC NAA+PROBE: NOT DETECTED

## 2021-10-17 PROCEDURE — U0004 COV-19 TEST NON-CDC HGH THRU: HCPCS

## 2021-10-17 PROCEDURE — C9803 HOPD COVID-19 SPEC COLLECT: HCPCS

## 2021-10-18 ENCOUNTER — ANESTHESIA EVENT (OUTPATIENT)
Dept: PERIOP | Facility: HOSPITAL | Age: 51
End: 2021-10-18

## 2021-10-18 RX ORDER — FAMOTIDINE 10 MG/ML
20 INJECTION, SOLUTION INTRAVENOUS ONCE
Status: CANCELLED | OUTPATIENT
Start: 2021-10-18 | End: 2021-10-18

## 2021-10-19 ENCOUNTER — APPOINTMENT (OUTPATIENT)
Dept: GENERAL RADIOLOGY | Facility: HOSPITAL | Age: 51
End: 2021-10-19

## 2021-10-19 ENCOUNTER — ANESTHESIA (OUTPATIENT)
Dept: PERIOP | Facility: HOSPITAL | Age: 51
End: 2021-10-19

## 2021-10-19 ENCOUNTER — HOSPITAL ENCOUNTER (INPATIENT)
Facility: HOSPITAL | Age: 51
LOS: 1 days | Discharge: HOME | End: 2021-10-20
Attending: NEUROLOGICAL SURGERY | Admitting: NEUROLOGICAL SURGERY

## 2021-10-19 DIAGNOSIS — M43.16 SPONDYLOLISTHESIS, LUMBAR REGION: ICD-10-CM

## 2021-10-19 DIAGNOSIS — M51.37 DEGENERATION OF LUMBAR OR LUMBOSACRAL INTERVERTEBRAL DISC: Primary | ICD-10-CM

## 2021-10-19 LAB — GLUCOSE BLDC GLUCOMTR-MCNC: 111 MG/DL (ref 70–130)

## 2021-10-19 PROCEDURE — C1713 ANCHOR/SCREW BN/BN,TIS/BN: HCPCS | Performed by: NEUROLOGICAL SURGERY

## 2021-10-19 PROCEDURE — 22853 INSJ BIOMECHANICAL DEVICE: CPT | Performed by: PHYSICIAN ASSISTANT

## 2021-10-19 PROCEDURE — 25010000003 CEFAZOLIN IN DEXTROSE 2-4 GM/100ML-% SOLUTION: Performed by: PHYSICIAN ASSISTANT

## 2021-10-19 PROCEDURE — C1889 IMPLANT/INSERT DEVICE, NOC: HCPCS | Performed by: NEUROLOGICAL SURGERY

## 2021-10-19 PROCEDURE — 25010000003 MEPERIDINE PER 100 MG: Performed by: NURSE ANESTHETIST, CERTIFIED REGISTERED

## 2021-10-19 PROCEDURE — 25010000002 FENTANYL CITRATE (PF) 50 MCG/ML SOLUTION: Performed by: NURSE ANESTHETIST, CERTIFIED REGISTERED

## 2021-10-19 PROCEDURE — 25010000002 DROPERIDOL PER 5 MG: Performed by: NURSE ANESTHETIST, CERTIFIED REGISTERED

## 2021-10-19 PROCEDURE — 25010000002 DEXAMETHASONE SODIUM PHOSPHATE 10 MG/ML SOLUTION: Performed by: NURSE ANESTHETIST, CERTIFIED REGISTERED

## 2021-10-19 PROCEDURE — 25010000003 CEFAZOLIN IN DEXTROSE 2-4 GM/100ML-% SOLUTION: Performed by: NEUROLOGICAL SURGERY

## 2021-10-19 PROCEDURE — 0ST20ZZ RESECTION OF LUMBAR VERTEBRAL DISC, OPEN APPROACH: ICD-10-PCS | Performed by: NEUROLOGICAL SURGERY

## 2021-10-19 PROCEDURE — 25010000002 BUPRENORPHINE PER 0.1 MG: Performed by: NEUROLOGICAL SURGERY

## 2021-10-19 PROCEDURE — 25010000002 ONDANSETRON PER 1 MG: Performed by: NURSE ANESTHETIST, CERTIFIED REGISTERED

## 2021-10-19 PROCEDURE — 25010000002 NEOSTIGMINE 10 MG/10ML SOLUTION: Performed by: NURSE ANESTHETIST, CERTIFIED REGISTERED

## 2021-10-19 PROCEDURE — 3E0U0GB INTRODUCTION OF RECOMBINANT BONE MORPHOGENETIC PROTEIN INTO JOINTS, OPEN APPROACH: ICD-10-PCS | Performed by: NEUROLOGICAL SURGERY

## 2021-10-19 PROCEDURE — 22630 ARTHRD PST TQ 1NTRSPC LUM: CPT | Performed by: NEUROLOGICAL SURGERY

## 2021-10-19 PROCEDURE — 22840 INSERT SPINE FIXATION DEVICE: CPT | Performed by: NEUROLOGICAL SURGERY

## 2021-10-19 PROCEDURE — 22630 ARTHRD PST TQ 1NTRSPC LUM: CPT | Performed by: PHYSICIAN ASSISTANT

## 2021-10-19 PROCEDURE — 22853 INSJ BIOMECHANICAL DEVICE: CPT | Performed by: NEUROLOGICAL SURGERY

## 2021-10-19 PROCEDURE — 0SG00AJ FUSION OF LUMBAR VERTEBRAL JOINT WITH INTERBODY FUSION DEVICE, POSTERIOR APPROACH, ANTERIOR COLUMN, OPEN APPROACH: ICD-10-PCS | Performed by: NEUROLOGICAL SURGERY

## 2021-10-19 PROCEDURE — 25010000002 DEXAMETHASONE SODIUM PHOSPHATE 10 MG/ML SOLUTION 1 ML VIAL: Performed by: NEUROLOGICAL SURGERY

## 2021-10-19 PROCEDURE — 0SG00K1 FUSION OF LUMBAR VERTEBRAL JOINT WITH NONAUTOLOGOUS TISSUE SUBSTITUTE, POSTERIOR APPROACH, POSTERIOR COLUMN, OPEN APPROACH: ICD-10-PCS | Performed by: NEUROLOGICAL SURGERY

## 2021-10-19 PROCEDURE — 76000 FLUOROSCOPY <1 HR PHYS/QHP: CPT

## 2021-10-19 PROCEDURE — 22840 INSERT SPINE FIXATION DEVICE: CPT | Performed by: PHYSICIAN ASSISTANT

## 2021-10-19 PROCEDURE — 82962 GLUCOSE BLOOD TEST: CPT

## 2021-10-19 PROCEDURE — 25010000002 PROPOFOL 10 MG/ML EMULSION: Performed by: NURSE ANESTHETIST, CERTIFIED REGISTERED

## 2021-10-19 DEVICE — ROD 1475501035 4.75 CCM NS CURV 35MM
Type: IMPLANTABLE DEVICE | Site: SPINE LUMBAR | Status: FUNCTIONAL
Brand: CD HORIZON® SPINAL SYSTEM

## 2021-10-19 DEVICE — HEMOST ABS SURGIFOAM SZ100 8X12 10MM: Type: IMPLANTABLE DEVICE | Site: SPINE LUMBAR | Status: FUNCTIONAL

## 2021-10-19 DEVICE — FLOSEAL HEMOSTATIC MATRIX, 10ML
Type: IMPLANTABLE DEVICE | Site: SPINE LUMBAR | Status: FUNCTIONAL
Brand: FLOSEAL HEMOSTATIC MATRIX

## 2021-10-19 DEVICE — BONE GRAFT KIT 7510200 INFUSE SMALL
Type: IMPLANTABLE DEVICE | Site: SPINE LUMBAR | Status: FUNCTIONAL
Brand: INFUSE® BONE GRAFT

## 2021-10-19 DEVICE — SPACER 7770828 ELEVATE STD 28X8MM
Type: IMPLANTABLE DEVICE | Site: SPINE LUMBAR | Status: FUNCTIONAL
Brand: ELEVATE™ SPINAL SYSTEM

## 2021-10-19 DEVICE — DBM T42200 2.5CMX10CM 2 EACH GRAFTON MAT
Type: IMPLANTABLE DEVICE | Site: SPINE LUMBAR | Status: FUNCTIONAL
Brand: GRAFTON®AND GRAFTON PLUS®DEMINERALIZED BONE MATRIX (DBM)

## 2021-10-19 RX ORDER — MAGNESIUM HYDROXIDE 1200 MG/15ML
LIQUID ORAL AS NEEDED
Status: DISCONTINUED | OUTPATIENT
Start: 2021-10-19 | End: 2021-10-19 | Stop reason: HOSPADM

## 2021-10-19 RX ORDER — MEPERIDINE HYDROCHLORIDE 25 MG/ML
12.5 INJECTION INTRAMUSCULAR; INTRAVENOUS; SUBCUTANEOUS
Status: DISCONTINUED | OUTPATIENT
Start: 2021-10-19 | End: 2021-10-19

## 2021-10-19 RX ORDER — ONDANSETRON 2 MG/ML
INJECTION INTRAMUSCULAR; INTRAVENOUS AS NEEDED
Status: DISCONTINUED | OUTPATIENT
Start: 2021-10-19 | End: 2021-10-19 | Stop reason: SURG

## 2021-10-19 RX ORDER — BUPIVACAINE HCL/0.9 % NACL/PF 0.125 %
PLASTIC BAG, INJECTION (ML) EPIDURAL AS NEEDED
Status: DISCONTINUED | OUTPATIENT
Start: 2021-10-19 | End: 2021-10-19 | Stop reason: SURG

## 2021-10-19 RX ORDER — ONDANSETRON 2 MG/ML
4 INJECTION INTRAMUSCULAR; INTRAVENOUS ONCE AS NEEDED
Status: COMPLETED | OUTPATIENT
Start: 2021-10-19 | End: 2021-10-19

## 2021-10-19 RX ORDER — LIDOCAINE HYDROCHLORIDE AND EPINEPHRINE 5; 5 MG/ML; UG/ML
INJECTION, SOLUTION INFILTRATION; PERINEURAL AS NEEDED
Status: DISCONTINUED | OUTPATIENT
Start: 2021-10-19 | End: 2021-10-19 | Stop reason: HOSPADM

## 2021-10-19 RX ORDER — SODIUM CHLORIDE 0.9 % (FLUSH) 0.9 %
10 SYRINGE (ML) INJECTION EVERY 12 HOURS SCHEDULED
Status: DISCONTINUED | OUTPATIENT
Start: 2021-10-19 | End: 2021-10-19 | Stop reason: HOSPADM

## 2021-10-19 RX ORDER — LIDOCAINE HYDROCHLORIDE 10 MG/ML
INJECTION, SOLUTION EPIDURAL; INFILTRATION; INTRACAUDAL; PERINEURAL AS NEEDED
Status: DISCONTINUED | OUTPATIENT
Start: 2021-10-19 | End: 2021-10-19 | Stop reason: SURG

## 2021-10-19 RX ORDER — HYDRALAZINE HYDROCHLORIDE 20 MG/ML
5 INJECTION INTRAMUSCULAR; INTRAVENOUS
Status: DISCONTINUED | OUTPATIENT
Start: 2021-10-19 | End: 2021-10-19

## 2021-10-19 RX ORDER — SODIUM CHLORIDE 0.9 % (FLUSH) 0.9 %
10 SYRINGE (ML) INJECTION AS NEEDED
Status: DISCONTINUED | OUTPATIENT
Start: 2021-10-19 | End: 2021-10-19 | Stop reason: HOSPADM

## 2021-10-19 RX ORDER — LIDOCAINE HYDROCHLORIDE 10 MG/ML
0.5 INJECTION, SOLUTION EPIDURAL; INFILTRATION; INTRACAUDAL; PERINEURAL ONCE AS NEEDED
Status: COMPLETED | OUTPATIENT
Start: 2021-10-19 | End: 2021-10-19

## 2021-10-19 RX ORDER — NEOSTIGMINE METHYLSULFATE 1 MG/ML
INJECTION, SOLUTION INTRAVENOUS AS NEEDED
Status: DISCONTINUED | OUTPATIENT
Start: 2021-10-19 | End: 2021-10-19 | Stop reason: SURG

## 2021-10-19 RX ORDER — IBUPROFEN 800 MG/1
800 TABLET ORAL ONCE
Status: COMPLETED | OUTPATIENT
Start: 2021-10-19 | End: 2021-10-19

## 2021-10-19 RX ORDER — ROCURONIUM BROMIDE 10 MG/ML
INJECTION, SOLUTION INTRAVENOUS AS NEEDED
Status: DISCONTINUED | OUTPATIENT
Start: 2021-10-19 | End: 2021-10-19 | Stop reason: SURG

## 2021-10-19 RX ORDER — ACETAMINOPHEN 500 MG
1000 TABLET ORAL ONCE
Status: COMPLETED | OUTPATIENT
Start: 2021-10-19 | End: 2021-10-19

## 2021-10-19 RX ORDER — SODIUM CHLORIDE 0.9 % (FLUSH) 0.9 %
10 SYRINGE (ML) INJECTION AS NEEDED
Status: DISCONTINUED | OUTPATIENT
Start: 2021-10-19 | End: 2021-10-20 | Stop reason: HOSPADM

## 2021-10-19 RX ORDER — FAMOTIDINE 20 MG/1
20 TABLET, FILM COATED ORAL ONCE
Status: COMPLETED | OUTPATIENT
Start: 2021-10-19 | End: 2021-10-19

## 2021-10-19 RX ORDER — NALOXONE HCL 0.4 MG/ML
0.4 VIAL (ML) INJECTION AS NEEDED
Status: DISCONTINUED | OUTPATIENT
Start: 2021-10-19 | End: 2021-10-19

## 2021-10-19 RX ORDER — EPHEDRINE SULFATE 50 MG/ML
INJECTION, SOLUTION INTRAVENOUS AS NEEDED
Status: DISCONTINUED | OUTPATIENT
Start: 2021-10-19 | End: 2021-10-19 | Stop reason: SURG

## 2021-10-19 RX ORDER — GABAPENTIN 100 MG/1
100 CAPSULE ORAL EVERY 12 HOURS SCHEDULED
Status: DISCONTINUED | OUTPATIENT
Start: 2021-10-19 | End: 2021-10-20 | Stop reason: HOSPADM

## 2021-10-19 RX ORDER — FENTANYL CITRATE 50 UG/ML
INJECTION, SOLUTION INTRAMUSCULAR; INTRAVENOUS AS NEEDED
Status: DISCONTINUED | OUTPATIENT
Start: 2021-10-19 | End: 2021-10-19 | Stop reason: SURG

## 2021-10-19 RX ORDER — DROPERIDOL 2.5 MG/ML
INJECTION, SOLUTION INTRAMUSCULAR; INTRAVENOUS
Status: DISPENSED
Start: 2021-10-19 | End: 2021-10-19

## 2021-10-19 RX ORDER — PROPOFOL 10 MG/ML
VIAL (ML) INTRAVENOUS AS NEEDED
Status: DISCONTINUED | OUTPATIENT
Start: 2021-10-19 | End: 2021-10-19 | Stop reason: SURG

## 2021-10-19 RX ORDER — FENTANYL CITRATE 50 UG/ML
INJECTION, SOLUTION INTRAMUSCULAR; INTRAVENOUS
Status: DISPENSED
Start: 2021-10-19 | End: 2021-10-20

## 2021-10-19 RX ORDER — HYDROCODONE BITARTRATE AND ACETAMINOPHEN 5; 325 MG/1; MG/1
1 TABLET ORAL EVERY 4 HOURS PRN
Status: DISCONTINUED | OUTPATIENT
Start: 2021-10-19 | End: 2021-10-20 | Stop reason: HOSPADM

## 2021-10-19 RX ORDER — DROPERIDOL 2.5 MG/ML
0.62 INJECTION, SOLUTION INTRAMUSCULAR; INTRAVENOUS AS NEEDED
Status: DISCONTINUED | OUTPATIENT
Start: 2021-10-19 | End: 2021-10-19

## 2021-10-19 RX ORDER — IPRATROPIUM BROMIDE AND ALBUTEROL SULFATE 2.5; .5 MG/3ML; MG/3ML
3 SOLUTION RESPIRATORY (INHALATION) ONCE AS NEEDED
Status: DISCONTINUED | OUTPATIENT
Start: 2021-10-19 | End: 2021-10-19

## 2021-10-19 RX ORDER — ONDANSETRON 2 MG/ML
INJECTION INTRAMUSCULAR; INTRAVENOUS
Status: DISPENSED
Start: 2021-10-19 | End: 2021-10-19

## 2021-10-19 RX ORDER — FENTANYL CITRATE 50 UG/ML
INJECTION, SOLUTION INTRAMUSCULAR; INTRAVENOUS
Status: DISPENSED
Start: 2021-10-19 | End: 2021-10-19

## 2021-10-19 RX ORDER — ONDANSETRON 4 MG/1
4 TABLET, FILM COATED ORAL EVERY 6 HOURS PRN
Status: DISCONTINUED | OUTPATIENT
Start: 2021-10-19 | End: 2021-10-20 | Stop reason: HOSPADM

## 2021-10-19 RX ORDER — CYCLOBENZAPRINE HCL 10 MG
10 TABLET ORAL EVERY 8 HOURS SCHEDULED
Status: DISCONTINUED | OUTPATIENT
Start: 2021-10-19 | End: 2021-10-20 | Stop reason: HOSPADM

## 2021-10-19 RX ORDER — ONDANSETRON 2 MG/ML
4 INJECTION INTRAMUSCULAR; INTRAVENOUS EVERY 6 HOURS PRN
Status: DISCONTINUED | OUTPATIENT
Start: 2021-10-19 | End: 2021-10-20 | Stop reason: HOSPADM

## 2021-10-19 RX ORDER — VENLAFAXINE HYDROCHLORIDE 37.5 MG/1
37.5 CAPSULE, EXTENDED RELEASE ORAL DAILY
Status: DISCONTINUED | OUTPATIENT
Start: 2021-10-20 | End: 2021-10-20 | Stop reason: HOSPADM

## 2021-10-19 RX ORDER — MEPERIDINE HYDROCHLORIDE 25 MG/ML
INJECTION INTRAMUSCULAR; INTRAVENOUS; SUBCUTANEOUS
Status: DISPENSED
Start: 2021-10-19 | End: 2021-10-19

## 2021-10-19 RX ORDER — CEFAZOLIN SODIUM 2 G/100ML
2 INJECTION, SOLUTION INTRAVENOUS ONCE
Status: COMPLETED | OUTPATIENT
Start: 2021-10-19 | End: 2021-10-19

## 2021-10-19 RX ORDER — GLYCOPYRROLATE 0.2 MG/ML
INJECTION INTRAMUSCULAR; INTRAVENOUS AS NEEDED
Status: DISCONTINUED | OUTPATIENT
Start: 2021-10-19 | End: 2021-10-19 | Stop reason: SURG

## 2021-10-19 RX ORDER — FENTANYL CITRATE 50 UG/ML
50 INJECTION, SOLUTION INTRAMUSCULAR; INTRAVENOUS
Status: DISCONTINUED | OUTPATIENT
Start: 2021-10-19 | End: 2021-10-19

## 2021-10-19 RX ORDER — MIDAZOLAM HYDROCHLORIDE 1 MG/ML
1 INJECTION INTRAMUSCULAR; INTRAVENOUS
Status: DISCONTINUED | OUTPATIENT
Start: 2021-10-19 | End: 2021-10-19 | Stop reason: HOSPADM

## 2021-10-19 RX ORDER — SODIUM CHLORIDE 0.9 % (FLUSH) 0.9 %
3-10 SYRINGE (ML) INJECTION AS NEEDED
Status: DISCONTINUED | OUTPATIENT
Start: 2021-10-19 | End: 2021-10-19

## 2021-10-19 RX ORDER — POLYETHYLENE GLYCOL 3350 17 G/17G
17 POWDER, FOR SOLUTION ORAL DAILY PRN
Status: DISCONTINUED | OUTPATIENT
Start: 2021-10-19 | End: 2021-10-20 | Stop reason: HOSPADM

## 2021-10-19 RX ORDER — PREGABALIN 150 MG/1
150 CAPSULE ORAL ONCE
Status: COMPLETED | OUTPATIENT
Start: 2021-10-19 | End: 2021-10-19

## 2021-10-19 RX ORDER — SODIUM CHLORIDE 0.9 % (FLUSH) 0.9 %
3 SYRINGE (ML) INJECTION EVERY 12 HOURS SCHEDULED
Status: DISCONTINUED | OUTPATIENT
Start: 2021-10-19 | End: 2021-10-20 | Stop reason: HOSPADM

## 2021-10-19 RX ORDER — DEXAMETHASONE SODIUM PHOSPHATE 10 MG/ML
INJECTION, SOLUTION INTRAMUSCULAR; INTRAVENOUS AS NEEDED
Status: DISCONTINUED | OUTPATIENT
Start: 2021-10-19 | End: 2021-10-19 | Stop reason: SURG

## 2021-10-19 RX ORDER — PROMETHAZINE HYDROCHLORIDE 25 MG/1
25 TABLET ORAL ONCE AS NEEDED
Status: DISCONTINUED | OUTPATIENT
Start: 2021-10-19 | End: 2021-10-19

## 2021-10-19 RX ORDER — LABETALOL HYDROCHLORIDE 5 MG/ML
5 INJECTION, SOLUTION INTRAVENOUS
Status: DISCONTINUED | OUTPATIENT
Start: 2021-10-19 | End: 2021-10-19

## 2021-10-19 RX ORDER — OXYCODONE HCL 10 MG/1
10 TABLET, FILM COATED, EXTENDED RELEASE ORAL ONCE
Status: COMPLETED | OUTPATIENT
Start: 2021-10-19 | End: 2021-10-19

## 2021-10-19 RX ORDER — SODIUM CHLORIDE, SODIUM LACTATE, POTASSIUM CHLORIDE, CALCIUM CHLORIDE 600; 310; 30; 20 MG/100ML; MG/100ML; MG/100ML; MG/100ML
9 INJECTION, SOLUTION INTRAVENOUS CONTINUOUS
Status: DISCONTINUED | OUTPATIENT
Start: 2021-10-19 | End: 2021-10-20 | Stop reason: HOSPADM

## 2021-10-19 RX ORDER — CEFAZOLIN SODIUM 2 G/100ML
2 INJECTION, SOLUTION INTRAVENOUS EVERY 8 HOURS
Status: COMPLETED | OUTPATIENT
Start: 2021-10-19 | End: 2021-10-20

## 2021-10-19 RX ORDER — DOCUSATE SODIUM 100 MG/1
100 CAPSULE, LIQUID FILLED ORAL 2 TIMES DAILY PRN
Status: DISCONTINUED | OUTPATIENT
Start: 2021-10-19 | End: 2021-10-20 | Stop reason: HOSPADM

## 2021-10-19 RX ORDER — SODIUM CHLORIDE 0.9 % (FLUSH) 0.9 %
3 SYRINGE (ML) INJECTION EVERY 12 HOURS SCHEDULED
Status: DISCONTINUED | OUTPATIENT
Start: 2021-10-19 | End: 2021-10-19

## 2021-10-19 RX ORDER — AMOXICILLIN 250 MG
1 CAPSULE ORAL NIGHTLY PRN
Status: DISCONTINUED | OUTPATIENT
Start: 2021-10-19 | End: 2021-10-20 | Stop reason: HOSPADM

## 2021-10-19 RX ORDER — DROPERIDOL 2.5 MG/ML
0.62 INJECTION, SOLUTION INTRAMUSCULAR; INTRAVENOUS ONCE AS NEEDED
Status: DISCONTINUED | OUTPATIENT
Start: 2021-10-19 | End: 2021-10-19

## 2021-10-19 RX ADMIN — PREGABALIN 150 MG: 150 CAPSULE ORAL at 06:53

## 2021-10-19 RX ADMIN — ONDANSETRON 4 MG: 2 INJECTION INTRAMUSCULAR; INTRAVENOUS at 11:03

## 2021-10-19 RX ADMIN — LIDOCAINE HYDROCHLORIDE 50 MG: 10 INJECTION, SOLUTION EPIDURAL; INFILTRATION; INTRACAUDAL; PERINEURAL at 07:49

## 2021-10-19 RX ADMIN — ROCURONIUM BROMIDE 50 MG: 10 INJECTION, SOLUTION INTRAVENOUS at 07:49

## 2021-10-19 RX ADMIN — DROPERIDOL 0.62 MG: 2.5 INJECTION, SOLUTION INTRAMUSCULAR; INTRAVENOUS at 11:08

## 2021-10-19 RX ADMIN — CYCLOBENZAPRINE 10 MG: 10 TABLET, FILM COATED ORAL at 20:20

## 2021-10-19 RX ADMIN — Medication 200 MCG: at 08:04

## 2021-10-19 RX ADMIN — GABAPENTIN 100 MG: 100 CAPSULE ORAL at 20:20

## 2021-10-19 RX ADMIN — FAMOTIDINE 20 MG: 20 TABLET ORAL at 06:52

## 2021-10-19 RX ADMIN — SODIUM CHLORIDE, POTASSIUM CHLORIDE, SODIUM LACTATE AND CALCIUM CHLORIDE: 600; 310; 30; 20 INJECTION, SOLUTION INTRAVENOUS at 10:20

## 2021-10-19 RX ADMIN — MEPERIDINE HYDROCHLORIDE 12.5 MG: 25 INJECTION INTRAMUSCULAR; INTRAVENOUS; SUBCUTANEOUS at 11:14

## 2021-10-19 RX ADMIN — CEFAZOLIN SODIUM 2 G: 2 INJECTION, SOLUTION INTRAVENOUS at 15:51

## 2021-10-19 RX ADMIN — HYDROCODONE BITARTRATE AND ACETAMINOPHEN 1 TABLET: 5; 325 TABLET ORAL at 15:51

## 2021-10-19 RX ADMIN — LIDOCAINE HYDROCHLORIDE 0.5 ML: 10 INJECTION, SOLUTION EPIDURAL; INFILTRATION; INTRACAUDAL; PERINEURAL at 06:52

## 2021-10-19 RX ADMIN — EPHEDRINE SULFATE 15 MG: 50 INJECTION INTRAVENOUS at 08:37

## 2021-10-19 RX ADMIN — EPHEDRINE SULFATE 15 MG: 50 INJECTION INTRAVENOUS at 09:10

## 2021-10-19 RX ADMIN — ONDANSETRON 4 MG: 2 INJECTION INTRAMUSCULAR; INTRAVENOUS at 10:20

## 2021-10-19 RX ADMIN — FENTANYL CITRATE 50 MCG: 50 INJECTION, SOLUTION INTRAMUSCULAR; INTRAVENOUS at 07:51

## 2021-10-19 RX ADMIN — GLYCOPYRROLATE 0.4 MG: 0.2 INJECTION INTRAMUSCULAR; INTRAVENOUS at 10:20

## 2021-10-19 RX ADMIN — NEOSTIGMINE METHYLSULFATE 3 MG: 0.5 INJECTION INTRAVENOUS at 10:20

## 2021-10-19 RX ADMIN — OXYCODONE HYDROCHLORIDE 10 MG: 10 TABLET, FILM COATED, EXTENDED RELEASE ORAL at 06:52

## 2021-10-19 RX ADMIN — ACETAMINOPHEN 1000 MG: 500 TABLET ORAL at 06:52

## 2021-10-19 RX ADMIN — FENTANYL CITRATE 50 MCG: 50 INJECTION INTRAMUSCULAR; INTRAVENOUS at 10:57

## 2021-10-19 RX ADMIN — SODIUM CHLORIDE, POTASSIUM CHLORIDE, SODIUM LACTATE AND CALCIUM CHLORIDE 9 ML/HR: 600; 310; 30; 20 INJECTION, SOLUTION INTRAVENOUS at 06:51

## 2021-10-19 RX ADMIN — Medication 100 MCG: at 08:16

## 2021-10-19 RX ADMIN — FENTANYL CITRATE 50 MCG: 50 INJECTION INTRAMUSCULAR; INTRAVENOUS at 13:13

## 2021-10-19 RX ADMIN — PROPOFOL 200 MG: 10 INJECTION, EMULSION INTRAVENOUS at 07:49

## 2021-10-19 RX ADMIN — DEXAMETHASONE SODIUM PHOSPHATE 10 MG: 10 INJECTION, SOLUTION INTRAMUSCULAR; INTRAVENOUS at 07:43

## 2021-10-19 RX ADMIN — IBUPROFEN 800 MG: 800 TABLET, FILM COATED ORAL at 06:52

## 2021-10-19 RX ADMIN — CYCLOBENZAPRINE 10 MG: 10 TABLET, FILM COATED ORAL at 15:51

## 2021-10-19 RX ADMIN — FENTANYL CITRATE 50 MCG: 50 INJECTION INTRAMUSCULAR; INTRAVENOUS at 10:50

## 2021-10-19 RX ADMIN — Medication 100 MCG: at 08:13

## 2021-10-19 RX ADMIN — FENTANYL CITRATE 50 MCG: 50 INJECTION, SOLUTION INTRAMUSCULAR; INTRAVENOUS at 07:47

## 2021-10-19 RX ADMIN — CEFAZOLIN SODIUM 2 G: 2 INJECTION, SOLUTION INTRAVENOUS at 07:51

## 2021-10-19 NOTE — ANESTHESIA PROCEDURE NOTES
Airway  Urgency: elective    Date/Time: 10/19/2021 7:50 AM  Airway not difficult    General Information and Staff    Patient location during procedure: OR  Anesthesiologist: Sandeep Hernandez MD  CRNA: Ann Lora CRNA    Indications and Patient Condition  Indications for airway management: airway protection    Preoxygenated: yes  MILS not maintained throughout  Mask difficulty assessment: 2 - vent by mask + OA or adjuvant +/- NMBA    Final Airway Details  Final airway type: endotracheal airway      Successful airway: ETT  Cuffed: yes   Successful intubation technique: direct laryngoscopy  Facilitating devices/methods: cricoid pressure and intubating stylet  Endotracheal tube insertion site: oral  Blade: Saroj  Blade size: 3  ETT size (mm): 7.0  Cormack-Lehane Classification: grade I - full view of glottis  Placement verified by: chest auscultation and capnometry   Measured from: lips  ETT/EBT  to lips (cm): 20  Number of attempts at approach: 1  Assessment: lips, teeth, and gum same as pre-op and atraumatic intubation    Additional Comments  Negative epigastric sounds, Breath sound equal bilaterally with symmetric chest rise and fall

## 2021-10-19 NOTE — PLAN OF CARE
Goal Outcome Evaluation:      Patient alert and oriented. Pleasant and cooperative with care. LISY in place with 30 mL output this shift. Ambulated standby assist 400 feet.  Dressing dry and intact with shadow drainage.  No complaints of numbness/tingling in any of 4 extremities.

## 2021-10-19 NOTE — ANESTHESIA PREPROCEDURE EVALUATION
Anesthesia Evaluation     Patient summary reviewed and Nursing notes reviewed                Airway   Mallampati: II  TM distance: >3 FB  Neck ROM: full  No difficulty expected  Dental - normal exam     Pulmonary - normal exam   (+) asthma,  Cardiovascular - normal exam    (+) hyperlipidemia,       Neuro/Psych- negative ROS  GI/Hepatic/Renal/Endo    (+) morbid obesity, GERD,      Musculoskeletal     Abdominal  - normal exam    Bowel sounds: normal.   Substance History - negative use     OB/GYN negative ob/gyn ROS         Other   arthritis,                      Anesthesia Plan    ASA 2     general     intravenous induction     Anesthetic plan, all risks, benefits, and alternatives have been provided, discussed and informed consent has been obtained with: patient.    Plan discussed with CRNA.

## 2021-10-19 NOTE — H&P
Pre-Op H&P  Nita Mcmahon  [id number]  1970    Chief complaint: Back Pain    HPI:    Patient is a 51 y.o.female who presents with an 8 year history of back pain. Pt reports that she has tried several modes of conservative therapy including PT and chiropractic but all have failed to this point. Pt is scheduled to undergo a posterior lumbar interbody fusion with possible decompression with Dr. Chavez.     Review of Systems:  General ROS: negative for chills, fever or skin lesions;  No changes since last office visit.  Neg for recent sick exposure  Cardiovascular ROS: no chest pain or dyspnea on exertion  Respiratory ROS: no cough, shortness of breath, or wheezing    Allergies:   Allergies   Allergen Reactions   • Avelox  [Moxifloxacin Hcl] Anaphylaxis   • Morphine Itching       Home Meds:    No current facility-administered medications on file prior to encounter.     Current Outpatient Medications on File Prior to Encounter   Medication Sig Dispense Refill   • chlorhexidine (HIBICLENS) 4 % external liquid Shower each day with solution for 5 days beginning 5 days before surgery. 120 mL 0   • estradiol (ESTRACE) 1 MG tablet TAKE 1 TABLET BY MOUTH EVERY DAY (Patient taking differently: Take 1 mg by mouth Daily.) 90 tablet 2   • meloxicam (MOBIC) 15 MG tablet Take 1 tablet by mouth Daily. 90 tablet 3   • vitamin B-6 (PYRIDOXINE) 100 MG tablet Take 1 tablet by mouth Daily. 90 tablet 0       PMH:   Past Medical History:   Diagnosis Date   • Asthma    • Diverticulosis    • Elevated cholesterol    • GERD (gastroesophageal reflux disease)    • History of esophageal reflux    • History of transfusion     NO REACTION   • Muscle spasm of back      PSH:    Past Surgical History:   Procedure Laterality Date   • ABDOMINAL SURGERY      scar tissue removal   • APPENDECTOMY     •  SECTION     • COLONOSCOPY     • ENDOSCOPY     • HERNIA REPAIR      UMBILICAL   • HYSTERECTOMY         Immunization  "History:  Influenza: 2020  Pneumococcal: NO  Tetanus: unknown  COVID: YES    Social History:   Tobacco:   Social History     Tobacco Use   Smoking Status Never Smoker   Smokeless Tobacco Never Used      Alcohol:     Social History     Substance and Sexual Activity   Alcohol Use No       Vitals:           /92 (BP Location: Right arm, Patient Position: Lying)   Pulse 82   Temp 97 °F (36.1 °C) (Temporal)   Resp 18   Ht 157.5 cm (62\")   Wt 87.1 kg (192 lb)   SpO2 97%   BMI 35.12 kg/m²     Physical Exam:  General Appearance:    Alert, cooperative, no distress, appears stated age   Head:    Normocephalic, without obvious abnormality, atraumatic   Lungs:     Clear to auscultation bilaterally, respirations unlabored    Heart:   Regular rate and rhythm, S1 and S2 normal, no murmur, rub    or gallop    Abdomen:    Soft, nontender.  +bowel sounds   Breast Exam:    deferred   Genitalia:    deferred   Extremities:   Extremities normal, atraumatic, no cyanosis or edema   Skin:   Skin color, texture, turgor normal, no rashes or lesions   Neurologic:   Grossly intact   Results Review  LABS:  Lab Results   Component Value Date    WBC 7.25 10/15/2021    HGB 14.0 10/15/2021    HCT 41.0 10/15/2021    MCV 89.9 10/15/2021     10/15/2021    GLUCOSE 101 (H) 10/15/2021    BUN 10 10/15/2021    CREATININE 0.69 10/15/2021    EGFRIFNONA 89 10/15/2021    EGFRIFAFRI 125 11/20/2020     10/15/2021    K 4.2 10/15/2021     10/15/2021    CO2 25.0 10/15/2021    CALCIUM 9.3 10/15/2021    ALBUMIN 4.30 12/17/2019    AST 13 12/17/2019    ALT 20 12/17/2019    BILITOT 0.2 12/17/2019       RADIOLOGY:  No radiology results for the last 3 days     I reviewed the patient's new clinical results.    Cancer Staging (if applicable)  Cancer Patient: __ yes __no __unknown; If yes, clinical stage T:__ N:__M:__, stage group or __N/A    Impression: Lower back Pain    Plan: posterior lumbar interbody fusion with possible " decompression      ELIAN Adams   10/19/21   6:55 AM EDT

## 2021-10-19 NOTE — OP NOTE
Pre-Operative Diagnosis: Lumbar spondylolisthesis, bilateral lateral recess stenosis, bilateral L5 radiculopathy  Post-Operative Diagnosis: Same    Procedures Performed:   1.  L4-5 posterior lumbar interbody fusion  2.  L4 laminectomy  3.  Application of posterior instrumentation, 1 spinal segment ([employer] Solera 4.75)  4.  Insertion of machined interbody graft ([employer] 8 x 28 elevate x2)  5.  Harvesting of local autograft  6.  Application of morselized allograft ([place], BMP)  7.  Use of intraoperative spinal navigation      Indication for procedure: This is a 51-year-old woman who presented to my office with a longstanding history of progressive low back pain and bilateral lower extremity pain and weakness.  She had an MRI which demonstrated congenital stenosis with a grade 1 spondylolisthesis at L4-5.  Clinically she had evidence of bilateral L5 nerve root compression.  She underwent a lumbar myelogram which demonstrated pathological motion at L4-5 with compression of the bilateral L5 nerve roots.  She failed conservative treatment.  Operative intervention is indicated.    I discussed the use of bone morphogenic protein with the patient prior to the surgery.  I discussed the risks and benefits and she consented to proceed.    Informed consent for this procedure was obtained from the patient.  The patient acknowledges risk of nerve root injury, paralysis, weakness, loss of sensation, permanent neurologic disability, bleeding, infection, iatrogenic instability, instrumentation failure, pseudoarthrosis, failure of benefit of the operation, spinal fluid leak, or need for additional procedures.  All questions were answered prior to beginning the procedure.  No guarantees were given or implied.  The patient elects to proceed with surgery.    Procedure in detail: The patient was identified in the preoperative holding area, where She underwent the uneventful induction of general, endotracheal anesthesia.  Jarrod  were placed by the nursing staff.  The patient was then gently rotated to the prone position on the Lewis OSI table.  Pressure points were inspected, and appropriately padded.  The patient's lumbar spine was then prepped and draped in the usual, sterile fashion.  A timeout was performed.  Intravenous antibiotics were administered.    Using lateral fluoroscopy, a spinal needle was then directed towards the junction of the transverse process and pedicles at L4, L5, using lateral fluoroscopic visualization.  At each level, approximately 10 mL of the neurosurgical cocktail consisting of buprenorphine, Marcaine, and Decadron was then infiltrated into the paraspinous musculature.  Please refer to the nursing notes for the composition of the aforementioned cocktail.    A midline, vertically oriented skin incision was then made over the L4/L5 after anesthetizing the skin.  The paraspinous musculature was then reflected off of the spinous process and lamina at L4.  The self-retaining retractors were placed.  The reference array was affixed to the inferior aspect of the spinous process at L3.  A 3-dimensional spin was then obtained.  The patient's anatomy was registered with the navigation system.    Entry points into the pedicles at L4 and L5 were identified bilaterally.  The [employer] Solera 4.75 mm system was used.  6.5 diameter screws were used at L4 entry points.  The L4 pedicle screw on the left excepted a 45 mm length screw and the remainder of the screws were all 40 mm.    The TLIF distractors were then placed in the polyaxial heads and affixed into place with the set screws.  Gentle distraction was then applied across the L4/L5 disc space.  A complete laminectomy was then carried out using the high-speed drill and the [place] rongeurs.  The thecal sac was retracted medially, and the exiting nerve root was identified and protected.  An annulotomy was then carried out at theL4/L5 interspace.  A complete discectomy  was then carried out.    After meticulously preparing the endplates, an 8 x 28 mm elevate cage was packed with harvested autograft and cindy DBM and was inserted into the L4-5 disc space using navigation.  The graft was expanded to its nominal height.    A similar procedure was subsequently carried out on the other side.    Harvested allograft and autograft was packed into the disc space and into the facet joints bilaterally, after decorticating the facet joints.  Epidural oozing was controlled using thrombin foam.  4.75 mm rods were then placed into the polyaxial heads and affixed using the set screws according to the manufacture's specifications.  A second 3-dimensional spin was obtained which confirmed satisfactory position of the pedicle screws and interbody devices.  The remainder of the BMP and allograft/autograft slurry was then packed into the decorticated facet joints.    A 10 flat LISY drain was then left in the subfascial space and tunneled out through separate stab incision.  The fascia and skin were then closed in layers.  Glue and a sterile dressing were then applied.    Sponge, instrument, and needle counts were all correct at the conclusion of the case.    Judy Jackson, physician assistant was responsible for performing the following activities: Retraction, Suction, Irrigation, Suturing and Closing and their skilled assistance was necessary for the success of this case.

## 2021-10-19 NOTE — ANESTHESIA POSTPROCEDURE EVALUATION
Patient: Nita Mcmahon    Procedure Summary     Date: 10/19/21 Room / Location:  [facility] /  [facility]    Anesthesia Start: 0743 Anesthesia Stop:     Procedure: LUMBAR LAMINECTOMY POSTERIOR LUMBAR INTERBODY FUSION L4-5 (N/A Spine Lumbar) Diagnosis:       Lumbar stenosis with neurogenic claudication      (Lumbar stenosis with neurogenic claudication [M48.062])    Surgeons: Primo Chavez MD Provider: Sandeep Hernandez MD    Anesthesia Type: general ASA Status: 2          Anesthesia Type: general    Vitals  Vitals Value Taken Time   /98 10/19/21 1042   Temp     Pulse 114 10/19/21 1043   Resp     SpO2     Vitals shown include unvalidated device data.        Post Anesthesia Care and Evaluation    Patient location during evaluation: PACU  Patient participation: complete - patient participated  Level of consciousness: awake and alert  Pain management: adequate  Airway patency: patent  Anesthetic complications: No anesthetic complications  PONV Status: none  Cardiovascular status: hemodynamically stable and acceptable  Respiratory status: nonlabored ventilation, acceptable and nasal cannula  Hydration status: acceptable    Comments: Patient stable to Recovery.

## 2021-10-20 ENCOUNTER — READMISSION MANAGEMENT (OUTPATIENT)
Dept: CALL CENTER | Facility: HOSPITAL | Age: 51
End: 2021-10-20

## 2021-10-20 VITALS
OXYGEN SATURATION: 94 % | TEMPERATURE: 98.1 F | SYSTOLIC BLOOD PRESSURE: 115 MMHG | HEIGHT: 62 IN | BODY MASS INDEX: 35.33 KG/M2 | HEART RATE: 95 BPM | RESPIRATION RATE: 18 BRPM | DIASTOLIC BLOOD PRESSURE: 79 MMHG | WEIGHT: 192 LBS

## 2021-10-20 PROCEDURE — 25010000003 CEFAZOLIN IN DEXTROSE 2-4 GM/100ML-% SOLUTION: Performed by: NEUROLOGICAL SURGERY

## 2021-10-20 PROCEDURE — 94799 UNLISTED PULMONARY SVC/PX: CPT

## 2021-10-20 PROCEDURE — 99024 POSTOP FOLLOW-UP VISIT: CPT | Performed by: PHYSICIAN ASSISTANT

## 2021-10-20 RX ORDER — DOCUSATE SODIUM 100 MG/1
100 CAPSULE, LIQUID FILLED ORAL 2 TIMES DAILY PRN
Qty: 30 CAPSULE | Refills: 2 | Status: SHIPPED | OUTPATIENT
Start: 2021-10-20 | End: 2021-11-24

## 2021-10-20 RX ORDER — CYCLOBENZAPRINE HCL 10 MG
10 TABLET ORAL EVERY 8 HOURS SCHEDULED
Qty: 60 TABLET | Refills: 2 | Status: SHIPPED | OUTPATIENT
Start: 2021-10-20 | End: 2021-10-27 | Stop reason: SDUPTHER

## 2021-10-20 RX ORDER — HYDROCODONE BITARTRATE AND ACETAMINOPHEN 7.5; 325 MG/1; MG/1
1 TABLET ORAL EVERY 4 HOURS PRN
Qty: 60 TABLET | Refills: 0 | Status: SHIPPED | OUTPATIENT
Start: 2021-10-20 | End: 2021-11-04

## 2021-10-20 RX ADMIN — VENLAFAXINE HYDROCHLORIDE 37.5 MG: 37.5 CAPSULE, EXTENDED RELEASE ORAL at 08:31

## 2021-10-20 RX ADMIN — GABAPENTIN 100 MG: 100 CAPSULE ORAL at 08:31

## 2021-10-20 RX ADMIN — SODIUM CHLORIDE, PRESERVATIVE FREE 3 ML: 5 INJECTION INTRAVENOUS at 08:31

## 2021-10-20 RX ADMIN — CYCLOBENZAPRINE 10 MG: 10 TABLET, FILM COATED ORAL at 05:23

## 2021-10-20 RX ADMIN — CEFAZOLIN SODIUM 2 G: 2 INJECTION, SOLUTION INTRAVENOUS at 00:28

## 2021-10-20 RX ADMIN — HYDROCODONE BITARTRATE AND ACETAMINOPHEN 1 TABLET: 5; 325 TABLET ORAL at 13:17

## 2021-10-20 RX ADMIN — HYDROCODONE BITARTRATE AND ACETAMINOPHEN 1 TABLET: 5; 325 TABLET ORAL at 00:28

## 2021-10-20 RX ADMIN — HYDROCODONE BITARTRATE AND ACETAMINOPHEN 1 TABLET: 5; 325 TABLET ORAL at 07:39

## 2021-10-20 NOTE — PLAN OF CARE
Goal Outcome Evaluation:  Plan of Care Reviewed With: patient, spouse        Progress: improving  Outcome Summary: dc home poc , pt pain controlled , ambulates well up as anel , dc summary covered, instructed

## 2021-10-20 NOTE — OUTREACH NOTE
Prep Survey      Responses   [facility] patient discharged from? [place]   Is LACE score < 7 ? Yes   Emergency Room discharge w/ pulse ox? No   Eligibility [facility]   Date of Admission 10/19/21   Date of Discharge 10/20/21   Discharge Disposition Home or Self Care   Discharge diagnosis L4-5 posterior lumbar interbody fusion,    L4 laminectomy   Does the patient have one of the following disease processes/diagnoses(primary or secondary)? General Surgery   Does the patient have Home health ordered? No   Is there a DME ordered? No   Prep survey completed? Yes          Soraida Sauceda RN

## 2021-10-21 ENCOUNTER — TELEPHONE (OUTPATIENT)
Dept: NEUROSURGERY | Facility: CLINIC | Age: 51
End: 2021-10-21

## 2021-10-21 ENCOUNTER — TRANSITIONAL CARE MANAGEMENT TELEPHONE ENCOUNTER (OUTPATIENT)
Dept: CALL CENTER | Facility: HOSPITAL | Age: 51
End: 2021-10-21

## 2021-10-21 NOTE — TELEPHONE ENCOUNTER
She can  Mag Citrate OTC and take Miralax daily. Drink plenty of water. Also can try Dulcolax suppository.

## 2021-10-21 NOTE — DISCHARGE SUMMARY
DISCHARGE SUMMARY  PATIENT:  JR MCMAHON  YOB: 1970  VISIT ID:  [id number]  PRIMARY CARE:  Mary Dixon MD  ADMITTING PHYSICIAN:  Sarahi rosado. providers found    DATE OF ADMISSION:  10/19/2021  DATE OF DISCHARGE:  10/21/21      ADMITTING DIAGNOSIS:  Lumbar spondylolisthesis, bilateral lateral recess stenosis, bilateral L5 radiculopathy    DISCHARGE DIAGNOSIS:  Lumbar spondylolisthesis, bilateral lateral recess stenosis, bilateral L5 radiculopathy    Past Medical History:   Diagnosis Date   • Asthma    • Diverticulosis    • Elevated cholesterol    • GERD (gastroesophageal reflux disease)    • History of esophageal reflux    • History of transfusion     NO REACTION   • Muscle spasm of back          PROCEDURES:  L4-5 posterior lumbar interbody fusion    BRIEF HOSPITAL COURSE:  Ms. Mcmahon is a 51 y.o. female who presented to our office with a longstanding history of progressive low back pain and bilateral lower extremity pain and weakness.  Preoperative imaging demonstrated stenosis with a grade 1 spondylolisthesis at L4-5.  She failed conservative treatment.  Operative intervention was indicated and she underwent uncomplicated L4-5 PLIF on 10/19/2021.  LISY drain was left in place postoperatively and removed prior to discharge on POD 1.    During hospital stay, vital signs remained stable and her postoperative dressing was clean, dry and intact.  The patient was ambulatory and voiding independently.  She tolerated p.o. diet without nausea or vomiting.  Pain was well controlled with oral medications at the time of discharge on POD 1, 10/20/2021.    DISCHARGE MEDICATIONS:     Discharge Medications      New Medications      Instructions Start Date   cyclobenzaprine 10 MG tablet  Commonly known as: FLEXERIL   10 mg, Oral, Every 8 Hours Scheduled      HYDROcodone-acetaminophen 7.5-325 MG per tablet  Commonly known as: NORCO   1 tablet, Oral, Every 4 Hours PRN      Stool Softener 100 MG  capsule  Generic drug: docusate sodium   100 mg, Oral, 2 Times Daily PRN         Changes to Medications      Instructions Start Date   albuterol sulfate  (90 Base) MCG/ACT inhaler  Commonly known as: ProAir HFA  What changed:   · when to take this  · reasons to take this   1 puff, Inhalation, 4 Times Daily - RT      venlafaxine XR 75 MG 24 hr capsule  Commonly known as: EFFEXOR-XR  What changed: how much to take   75 mg, Oral, Daily         Continue These Medications      Instructions Start Date   diphenhydrAMINE 25 mg capsule  Commonly known as: BENADRYL   25 mg, Oral, Nightly, SLEEP       estradiol 1 MG tablet  Commonly known as: ESTRACE   TAKE 1 TABLET BY MOUTH EVERY DAY      fluticasone 50 MCG/ACT nasal spray  Commonly known as: FLONASE   2 sprays, Nasal, Nightly PRN      gabapentin 100 MG capsule  Commonly known as: NEURONTIN   100 mg, Oral, 2 times daily      levocetirizine 5 MG tablet  Commonly known as: XYZAL   5 mg, Oral, Every Evening      meloxicam 15 MG tablet  Commonly known as: MOBIC   15 mg, Oral, Daily      multivitamin with minerals tablet tablet   1 tablet, Oral, Daily      vitamin B-6 100 MG tablet  Commonly known as: PYRIDOXINE   100 mg, Oral, Daily         Stop These Medications    chlorhexidine 4 % external liquid  Commonly known as: HIBICLENS          Discharge to home    ACTIVITY:  As tolerated.  No heavy lifting, bending, twisting.  No driving until follow-up    DIET:  As tolerated    FOLLOW UP:  2 weeks with neurosurgery PA     Follow-up Information     Mary Dixon MD Follow up.    Specialty: Internal Medicine  Why: December 13, 2021 @ 08:00 a.m.  Contact information:  [address]  [address] [address] [phone]

## 2021-10-21 NOTE — TELEPHONE ENCOUNTER
Provider:  Scott  Caller: patient  Time of call:   3:36  Phone #:  [phone]  Surgery:  Lumbar laminectomy  Surgery Date:  10/19/21  Last visit:   same  Next visit: 11-03-21    YENNY:         Reason for call:    Patient called and said she has not had a BM since Sunday.  She is taking an OTC stool softer and wants to know if we can call something in.

## 2021-10-21 NOTE — OUTREACH NOTE
Call Center TCM Note      Responses   [facility] patient discharged from? [place]   Does the patient have one of the following disease processes/diagnoses(primary or secondary)? General Surgery   TCM attempt successful? Yes   Discharge diagnosis L4-5 posterior lumbar interbody fusion,    L4 laminectomy   Meds reviewed with patient/caregiver? Yes   Is the patient having any side effects they believe may be caused by any medication additions or changes? Yes   Does the patient have all medications related to this admission filled (includes all antibiotics, pain medications, etc.) Yes   Is the patient taking all medications as directed (includes completed medication regime)? Yes   Does the patient have a follow up appointment scheduled with their surgeon? Yes   Has the patient kept scheduled appointments due by today? Yes   Has home health visited the patient within 72 hours of discharge? N/A   Psychosocial issues? No   Did the patient receive a copy of their discharge instructions? Yes   Nursing interventions Reviewed instructions with patient   What is the patient's perception of their health status since discharge? Improving   Nursing interventions Nurse provided patient education   Is the patient /caregiver able to teach back basic post-op care? Continue use of incentive spirometry at least 1 week post discharge,  Take showers only when approved by MD-sponge bathe until then,  Lifting as instructed by MD in discharge instructions,  No tub bath, swimming, or hot tub until instructed by MD,  Practice 'cough and deep breath',  Drive as instructed by MD in discharge instructions,  Keep incision areas clean,dry and protected,  Do not remove steri-strips   Is the patient/caregiver able to teach back signs and symptoms of incisional infection? Increased redness, swelling or pain at the incisonal site,  Pus or odor from incision,  Fever,  Increased drainage or bleeding,  Incisional warmth   Is the patient/caregiver  able to teach back steps to recovery at home? Set small, achievable goals for return to baseline health,  Practice good oral hygiene,  Eat a well-balance diet,  Rest and rebuild strength, gradually increase activity,  Weigh daily,  Make a list of questions for surgeon's appointment   If the patient is a current smoker, are they able to teach back resources for cessation? Not a smoker   Is the patient/caregiver able to teach back the hierarchy of who to call/visit for symptoms/problems? PCP, Specialist, Home health nurse, Urgent Care, ED, 911 Yes   TCM call completed? Yes   Wrap up additional comments Pt doing ok s/p Lumbar Fusion. Having alot of pain, taking Norco & Flexeril at scheduled doses. No BM since Sun/Mon despite stool softener, daily normal for her. I scuggested call to [facility] for instruction. Pt did feel nauseated and dizzy post shower today but is resolved. POST OP is 11/03, PCP knew of sx and told pt to keep 12/2021 fwp.           Nae Lynn MA    10/21/2021, 14:57 EDT

## 2021-10-25 ENCOUNTER — TELEPHONE (OUTPATIENT)
Dept: NEUROSURGERY | Facility: CLINIC | Age: 51
End: 2021-10-25

## 2021-10-25 NOTE — TELEPHONE ENCOUNTER
Patient called with complaints of irritation surrounding her incision.  I reviewed a picture of her incision, she appears to have contact dermatitis to the bandage that she has left arm since surgery.  She is complaining of itching around the incision.  She had a small amount of serosanguineous drainage on her bandage.  Denies any purulent drainage or swelling.  Denies fevers or chills.  I have advised her to take some Benadryl and use hydrocortisone cream surrounding her incision, not over top of the incision.  She may leave her incision uncovered.  I carefully reviewed the signs/symptoms of wound infection with her.  She will call back tomorrow with an update.  I will be happy to see her for wound check if she has any new or worsening symptoms.

## 2021-10-26 NOTE — TELEPHONE ENCOUNTER
"Patient called back today with update. States her rash has spread along her back more with \"blisters\". Has had serosanguineous drainage from incision. No wound dehiscence. No fever/chills. She sent a picture and I reviewed with Dr. Chavez. Recommend oral benadryl, topical hydrocortisone cream, symptomatic treatment. She will come in tomorrow for a wound check at noon. Jessica, please add to my schedule.   "

## 2021-10-27 ENCOUNTER — OFFICE VISIT (OUTPATIENT)
Dept: NEUROSURGERY | Facility: CLINIC | Age: 51
End: 2021-10-27

## 2021-10-27 VITALS
DIASTOLIC BLOOD PRESSURE: 84 MMHG | WEIGHT: 195.4 LBS | SYSTOLIC BLOOD PRESSURE: 126 MMHG | HEIGHT: 62 IN | BODY MASS INDEX: 35.96 KG/M2 | TEMPERATURE: 97.1 F

## 2021-10-27 DIAGNOSIS — Z98.1 S/P LUMBAR FUSION: Primary | ICD-10-CM

## 2021-10-27 DIAGNOSIS — T81.89XA DRAINING POSTOPERATIVE WOUND, INITIAL ENCOUNTER: ICD-10-CM

## 2021-10-27 PROCEDURE — 99024 POSTOP FOLLOW-UP VISIT: CPT | Performed by: PHYSICIAN ASSISTANT

## 2021-10-27 RX ORDER — TRAMADOL HYDROCHLORIDE 50 MG/1
50 TABLET ORAL EVERY 6 HOURS PRN
Qty: 35 TABLET | Refills: 0 | Status: SHIPPED | OUTPATIENT
Start: 2021-10-27 | End: 2021-11-04

## 2021-10-27 RX ORDER — CYCLOBENZAPRINE HCL 10 MG
10 TABLET ORAL EVERY 8 HOURS SCHEDULED
Qty: 60 TABLET | Refills: 2 | Status: SHIPPED | OUTPATIENT
Start: 2021-10-27 | End: 2021-11-04

## 2021-10-27 NOTE — PROGRESS NOTES
Subjective     Chief Complaint: wound check     Patient ID: Nita Mcmahon is a 51 y.o. female is here today for follow-up.    History of Present Illness    This is a 51-year-old woman who underwent an uncomplicated L4-5 PLIF on 10/19/2021.  Four days ago, on 10/24/2020, she began to notice a rash along the area of her bandage with associated serous drainage.  Since that time, the rash has spread across her low back and flank.  She has continued to have serosanguineous drainage from her incision.  She denies any purulent drainage, fevers, chills, nausea, vomiting or other constitutional symptoms.  She has been taking Benadryl and using hydrocortisone cream without improvement.    The following portions of the patient's history were reviewed and updated as appropriate: allergies, current medications, past family history, past medical history, past social history, past surgical history and problem list.    Family history:   Family History   Problem Relation Age of Onset   • Arthritis Other    • Diabetes Other    • Hyperlipidemia Other    • Obesity Other    • Mental illness Other    • Heart attack Other        Social history:   Social History     Socioeconomic History   • Marital status:    Tobacco Use   • Smoking status: Never Smoker   • Smokeless tobacco: Never Used   Vaping Use   • Vaping Use: Never used   Substance and Sexual Activity   • Alcohol use: No   • Drug use: No   • Sexual activity: Yes     Partners: Male     Comment:        Review of Systems   Constitutional: Negative for activity change, appetite change, chills, diaphoresis, fatigue, fever and unexpected weight change.   HENT: Negative for congestion, dental problem, drooling, ear discharge, ear pain, facial swelling, hearing loss, mouth sores, nosebleeds, postnasal drip, rhinorrhea, sinus pressure, sneezing, sore throat, tinnitus, trouble swallowing and voice change.    Eyes: Negative for photophobia, pain, discharge, redness,  "itching and visual disturbance.   Respiratory: Negative for apnea, cough, choking, chest tightness, shortness of breath, wheezing and stridor.    Cardiovascular: Negative for chest pain, palpitations and leg swelling.   Gastrointestinal: Negative for abdominal distention, abdominal pain, anal bleeding, blood in stool, constipation, diarrhea, nausea, rectal pain and vomiting.   Endocrine: Negative for cold intolerance, heat intolerance, polydipsia, polyphagia and polyuria.   Genitourinary: Negative for decreased urine volume, difficulty urinating, dysuria, enuresis, flank pain, frequency, genital sores, hematuria and urgency.   Musculoskeletal: Positive for back pain. Negative for arthralgias, gait problem, joint swelling, myalgias, neck pain and neck stiffness.   Skin: Positive for rash and wound. Negative for color change and pallor.   Allergic/Immunologic: Negative for environmental allergies, food allergies and immunocompromised state.   Neurological: Negative for dizziness, tremors, seizures, syncope, facial asymmetry, speech difficulty, weakness, light-headedness, numbness and headaches.   Hematological: Negative for adenopathy. Does not bruise/bleed easily.   Psychiatric/Behavioral: Negative for agitation, behavioral problems, confusion, decreased concentration, dysphoric mood, hallucinations, self-injury, sleep disturbance and suicidal ideas. The patient is not nervous/anxious and is not hyperactive.        Objective   Blood pressure 126/84, temperature 97.1 °F (36.2 °C), temperature source Infrared, height 157.5 cm (62\"), weight 88.6 kg (195 lb 6.4 oz).  Body mass index is 35.74 kg/m².  Patient's Body mass index is 35.74 kg/m². indicating that she is obese (BMI >30). Obesity-related health conditions include the following: osteoarthritis. Obesity is unchanged. BMI is is above average; BMI management plan is completed. We discussed portion control and increasing exercise..      Physical Exam  Constitutional:  "      General: She is not in acute distress.     Appearance: She is obese. She is not ill-appearing, toxic-appearing or diaphoretic.   HENT:      Head: Normocephalic and atraumatic.   Eyes:      Conjunctiva/sclera: Conjunctivae normal.   Pulmonary:      Effort: Pulmonary effort is normal.   Skin:     Findings: Rash present. Rash is urticarial.          Neurological:      General: No focal deficit present.      Mental Status: She is alert and oriented to person, place, and time.      GCS: GCS eye subscore is 4. GCS verbal subscore is 5. GCS motor subscore is 6.      Motor: Motor function is intact.   Psychiatric:         Mood and Affect: Mood normal.         Behavior: Behavior normal.           Assessment/Plan     This is a 51-year-old woman who is 8 days status post L4-5 PLIF.  She presents with 4 days of progressive pruritic rash. Her rash appears consistent with a contact dermatitis. No fevers, chills or signs/symptoms of infection. These were reviewed with patient and her mother in law. They were instructed to call if she develops fevers, shaking chills, worsening drainage or increased pain. Continue benadryl and hydrocortisone cream. Follow up in one week for wound check.     Diagnoses and all orders for this visit:    1. S/P lumbar fusion (Primary)    2. Draining postoperative wound, initial encounter        Return if symptoms worsen or fail to improve, for Next scheduled follow up.             Judy Jackson PA-C

## 2021-11-01 ENCOUNTER — TELEPHONE (OUTPATIENT)
Dept: NEUROSURGERY | Facility: CLINIC | Age: 51
End: 2021-11-01

## 2021-11-01 NOTE — TELEPHONE ENCOUNTER
Provider:  Scott  Surgery:  LUMBAR LAMINECTOMY POSTERIOR LUMBAR INTERBODY FUSION L4-5  Surgery Date:  Op Note by Primo Chavez MD (10/19/2021 08:15)    Last visit:   Office Visit with Judy Jackson PA-C (10/27/2021)    Next visit: 11/03/21      Reason for call:         ----- Message from Nita Mcmahon sent at 10/29/2021 12:45 PM EDT -----  Regarding: Wound care help   Dr Chavez, would it be possible for you to call in a prescription for a steroid to help clear up this rash quicker? I am currently not using the Albuterol inhaler so I’m not getting any steroid meds. I have switched over to the Benadryl cream, but my back is still so itchy. The rash is just so uncomfortable and I’m really desperate to get it cleared up.

## 2021-11-03 ENCOUNTER — OFFICE VISIT (OUTPATIENT)
Dept: NEUROSURGERY | Facility: CLINIC | Age: 51
End: 2021-11-03

## 2021-11-03 VITALS
TEMPERATURE: 97.6 F | BODY MASS INDEX: 35.33 KG/M2 | SYSTOLIC BLOOD PRESSURE: 118 MMHG | HEIGHT: 62 IN | WEIGHT: 192 LBS | DIASTOLIC BLOOD PRESSURE: 78 MMHG

## 2021-11-03 DIAGNOSIS — R21 RASH: ICD-10-CM

## 2021-11-03 DIAGNOSIS — Z98.1 S/P LUMBAR FUSION: ICD-10-CM

## 2021-11-03 DIAGNOSIS — T81.89XA DRAINING POSTOPERATIVE WOUND, INITIAL ENCOUNTER: Primary | ICD-10-CM

## 2021-11-03 PROCEDURE — 99024 POSTOP FOLLOW-UP VISIT: CPT | Performed by: PHYSICIAN ASSISTANT

## 2021-11-03 RX ORDER — HYDROCORTISONE 25 MG/ML
LOTION TOPICAL 2 TIMES DAILY
Qty: 118 ML | Refills: 1 | Status: SHIPPED | OUTPATIENT
Start: 2021-11-03 | End: 2021-11-24

## 2021-11-03 NOTE — PROGRESS NOTES
NAME: JR MCMAHON   DOS: 2021  : 1970  PCP: Mary Dixon MD    Chief Complaint:  Post-op      History of Present Illness: Ms. Mcmahon is a 51 y.o. female who underwent an uncomplicated L4-5 Bleph on 10/19/2021 with Dr. Chavez.  On 10/24/2021, she began to notice a rash along the area of her panties with associated serous drainage.  The rash spread across her low back and flanks with noted serosanguineous drainage from incision.  At that time, she had been taking Benadryl and using over-the-counter hydrocortisone cream.    Today, she is seen today in follow-up.  The rash is now only located in the area of the bandage.  She continues to notes significant pruritis at area surrounding the incision.  She continues to take Benadryl oral, Benadryl cream, and cortisone 10 over-the-counter.  She notes the hydrocortisone is the most beneficial, however, she is still in significant pain.  Denies any purulent drainage, fevers, chills, nausea, vomiting or other constitutional symptoms.  She is wanting oral prednisone today.  She is seen today for wound check.      Past Medical History:   Diagnosis Date   • Asthma    • Diverticulosis    • Elevated cholesterol    • GERD (gastroesophageal reflux disease)    • History of esophageal reflux    • History of transfusion     NO REACTION   • Muscle spasm of back        Past Surgical History:   Procedure Laterality Date   • ABDOMINAL SURGERY      scar tissue removal   • APPENDECTOMY     •  SECTION     • COLONOSCOPY     • ENDOSCOPY     • HERNIA REPAIR      UMBILICAL   • HYSTERECTOMY     • LUMBAR DISCECTOMY FUSION INSTRUMENTATION N/A 10/19/2021    Procedure: LUMBAR LAMINECTOMY POSTERIOR LUMBAR INTERBODY FUSION L4-5;  Surgeon: Primo Chavez MD;  Location: [facility];  Service: Neurosurgery;  Laterality: N/A;             Review of Systems   Psychiatric/Behavioral: Positive for dysphoric mood. The patient is nervous/anxious.    All  other systems reviewed and are negative.           Medications:    Current Outpatient Medications:   •  albuterol sulfate HFA (ProAir HFA) 108 (90 Base) MCG/ACT inhaler, Inhale 1 puff 4 (Four) Times a Day. (Patient taking differently: Inhale 1 puff At Night As Needed.), Disp: 18 g, Rfl: 11  •  diphenhydrAMINE (BENADRYL) 25 mg capsule, Take 25 mg by mouth Every Night. SLEEP, Disp: , Rfl:   •  docusate sodium (COLACE) 100 MG capsule, Take 1 capsule by mouth 2 (Two) Times a Day As Needed for Constipation., Disp: 30 capsule, Rfl: 2  •  estradiol (ESTRACE) 1 MG tablet, TAKE 1 TABLET BY MOUTH EVERY DAY (Patient taking differently: Take 1 mg by mouth Daily.), Disp: 90 tablet, Rfl: 2  •  fluticasone (FLONASE) 50 MCG/ACT nasal spray, 2 sprays into the nostril(s) as directed by provider At Night As Needed for Rhinitis or Allergies., Disp: , Rfl:   •  gabapentin (NEURONTIN) 100 MG capsule, Take 1 capsule by mouth 2 (two) times a day., Disp: 60 capsule, Rfl: 0  •  hydrocortisone 2.5 % lotion, Apply  topically to the appropriate area as directed 2 (Two) Times a Day., Disp: 118 mL, Rfl: 1  •  levocetirizine (XYZAL) 5 MG tablet, Take 5 mg by mouth Every Evening., Disp: , Rfl:   •  meloxicam (MOBIC) 15 MG tablet, Take 1 tablet by mouth Daily., Disp: 90 tablet, Rfl: 3  •  multivitamin with minerals (MULTIVITAMIN ADULTS PO), Take 1 tablet by mouth Daily., Disp: , Rfl:   •  venlafaxine XR (EFFEXOR-XR) 75 MG 24 hr capsule, Take 1 capsule by mouth Daily. (Patient taking differently: Take 37.5 mg by mouth Daily.), Disp: 90 capsule, Rfl: 3  •  vitamin B-6 (PYRIDOXINE) 100 MG tablet, Take 1 tablet by mouth Daily., Disp: 90 tablet, Rfl: 0  No current facility-administered medications for this visit.    Allergies:  Allergies   Allergen Reactions   • Avelox  [Moxifloxacin Hcl] Anaphylaxis   • Tramadol Mental Status Change     Patient notes very emotional   • Morphine Itching       Social History     Tobacco Use   • Smoking status: Never  Smoker   • Smokeless tobacco: Never Used   Vaping Use   • Vaping Use: Never used   Substance Use Topics   • Alcohol use: No   • Drug use: No       Family History   Problem Relation Age of Onset   • Arthritis Other    • Diabetes Other    • Hyperlipidemia Other    • Obesity Other    • Mental illness Other    • Heart attack Other        Review of Imaging:  No new imaging to review    Vitals:    11/03/21 1446   BP: 118/78   Temp: 97.6 °F (36.4 °C)     Body mass index is 35.12 kg/m².    Physical Exam  Constitutional:       General: She is not in acute distress.     Appearance: She is obese. She is not ill-appearing, toxic-appearing or diaphoretic.   HENT:      Head: Normocephalic and atraumatic.   Pulmonary:      Effort: No respiratory distress.   Skin:     Findings: Rash present. Rash is urticarial.   Neurological:      Mental Status: She is oriented to person, place, and time.   Psychiatric:         Mood and Affect: Mood is anxious.         Speech: Speech normal.       Neurologic Exam     Mental Status   Oriented to person, place, and time.   Speech: speech is normal           Diagnoses/Plan:    Ms. Mcmahon is a 51 y.o. female who is seen today 2 weeks status post L4-5 placed by Dr. Chavez.  She has had a pruritic rash over the last 10 days that appears to be consistent with contact dermatitis.  From last wound check, the rash has greatly improved.  Although, patient notes she is very uncomfortable due to the pruritic nature.  Therefore, did provide patient prescription strength hydrocortisone to see if this provides her with greater relief.  She will continue to take her Benadryl.  Signs and symptoms, specifically fever, chills, increased drainage or pain, were reviewed and she was instructed to call our office.  We will have her follow-up in 1 week for ongoing wound check.  She notes understanding and willing to proceed.      Patient's Body mass index is 35.12 kg/m². indicating that she is obese (BMI >30).  Obesity-related health conditions include the following: osteoarthritis. Obesity is unchanged. BMI is is above average; BMI management plan is completed. We discussed portion control and increasing exercise..      Lila Greer PA-C

## 2021-11-04 NOTE — PATIENT INSTRUCTIONS

## 2021-11-10 ENCOUNTER — TELEPHONE (OUTPATIENT)
Dept: PAIN MEDICINE | Facility: CLINIC | Age: 51
End: 2021-11-10

## 2021-11-10 NOTE — TELEPHONE ENCOUNTER
Provider: JESSICA  Caller: JR SANFORD  Relationship to Patient: PATIENT  Pharmacy: NA  Phone Number: [phone]  Reason for Call: PATIENT IS WANTING COPIES OF UB04 FOR DATES OF SERVICE 3.9.21 AND 3.22.21  When was the patient last seen: 7.19.21

## 2021-11-10 NOTE — TELEPHONE ENCOUNTER
Spoke with pt, advised that she needed to call billing for this information and provided the phone number- [phone]. Pt understood.

## 2021-11-11 DIAGNOSIS — M19.90 ARTHRITIS: ICD-10-CM

## 2021-11-11 RX ORDER — MELOXICAM 15 MG/1
15 TABLET ORAL DAILY
Qty: 90 TABLET | Refills: 3 | Status: SHIPPED | OUTPATIENT
Start: 2021-11-11 | End: 2022-06-03 | Stop reason: SDUPTHER

## 2021-11-11 NOTE — TELEPHONE ENCOUNTER
Caller: Nita Mcmahon    Relationship: Self    Best call back number: [phone]    What medications are you currently taking:   Current Outpatient Medications on File Prior to Visit   Medication Sig Dispense Refill   • albuterol sulfate HFA (ProAir HFA) 108 (90 Base) MCG/ACT inhaler Inhale 1 puff 4 (Four) Times a Day. (Patient taking differently: Inhale 1 puff At Night As Needed.) 18 g 11   • diphenhydrAMINE (BENADRYL) 25 mg capsule Take 25 mg by mouth Every Night. SLEEP     • docusate sodium (COLACE) 100 MG capsule Take 1 capsule by mouth 2 (Two) Times a Day As Needed for Constipation. 30 capsule 2   • estradiol (ESTRACE) 1 MG tablet TAKE 1 TABLET BY MOUTH EVERY DAY (Patient taking differently: Take 1 mg by mouth Daily.) 90 tablet 2   • fluticasone (FLONASE) 50 MCG/ACT nasal spray 2 sprays into the nostril(s) as directed by provider At Night As Needed for Rhinitis or Allergies.     • gabapentin (NEURONTIN) 100 MG capsule Take 1 capsule by mouth 2 (two) times a day. 60 capsule 0   • hydrocortisone 2.5 % lotion Apply  topically to the appropriate area as directed 2 (Two) Times a Day. 118 mL 1   • levocetirizine (XYZAL) 5 MG tablet Take 5 mg by mouth Every Evening.     • meloxicam (MOBIC) 15 MG tablet Take 1 tablet by mouth Daily. 90 tablet 3   • multivitamin with minerals (MULTIVITAMIN ADULTS PO) Take 1 tablet by mouth Daily.     • venlafaxine XR (EFFEXOR-XR) 75 MG 24 hr capsule Take 1 capsule by mouth Daily. (Patient taking differently: Take 37.5 mg by mouth Daily.) 90 capsule 3   • vitamin B-6 (PYRIDOXINE) 100 MG tablet Take 1 tablet by mouth Daily. 90 tablet 0     No current facility-administered medications on file prior to visit.          When did you start taking these medications:    Which medication are you concerned about: VENLAFAXINE 75 MG    Who prescribed you this medication: DR BARRON    What are your concerns: PATIENT SAID THIS USED TO BE 37.5 AND SHE IS CONCERNED THAT IS NOW 75 MG; IS  THIS CORRECT??     PLEASE CALL TO ADVISE

## 2021-11-12 ENCOUNTER — OFFICE VISIT (OUTPATIENT)
Dept: NEUROSURGERY | Facility: CLINIC | Age: 51
End: 2021-11-12

## 2021-11-12 ENCOUNTER — LAB (OUTPATIENT)
Dept: LAB | Facility: HOSPITAL | Age: 51
End: 2021-11-12

## 2021-11-12 VITALS
DIASTOLIC BLOOD PRESSURE: 85 MMHG | BODY MASS INDEX: 34.96 KG/M2 | SYSTOLIC BLOOD PRESSURE: 132 MMHG | HEART RATE: 95 BPM | RESPIRATION RATE: 20 BRPM | HEIGHT: 62 IN | WEIGHT: 190 LBS

## 2021-11-12 DIAGNOSIS — Z98.1 S/P LUMBAR FUSION: ICD-10-CM

## 2021-11-12 DIAGNOSIS — Z98.1 S/P LUMBAR FUSION: Primary | ICD-10-CM

## 2021-11-12 PROCEDURE — 99024 POSTOP FOLLOW-UP VISIT: CPT | Performed by: PHYSICIAN ASSISTANT

## 2021-11-12 PROCEDURE — 87070 CULTURE OTHR SPECIMN AEROBIC: CPT

## 2021-11-12 PROCEDURE — 87147 CULTURE TYPE IMMUNOLOGIC: CPT

## 2021-11-12 PROCEDURE — 87205 SMEAR GRAM STAIN: CPT

## 2021-11-12 PROCEDURE — 87186 SC STD MICRODIL/AGAR DIL: CPT

## 2021-11-12 NOTE — PROGRESS NOTES
Subjective     Chief Complaint: Wound check    Patient ID: Nita Mcmahon is a 51 y.o. female is here today for follow-up.    History of Present Illness    This is a 51-year-old woman who underwent uncomplicated L4-5 PLIF on 10/19 with Dr. Chavez.  We have been following her for a pruritic rash that she developed postoperatively.  She reports today that the rash has improved.  She has been using hydrocortisone and Benadryl cream on her back as well as directly over incision and has been applying this liberally.  She reports over the last few days she has noticed some drainage on her bandage when she wakes up in the morning that is yellow.  She denies any fevers, chills, or other constitutional symptoms.    The following portions of the patient's history were reviewed and updated as appropriate: allergies, current medications, past family history, past medical history, past social history, past surgical history and problem list.    Family history:   Family History   Problem Relation Age of Onset   • Arthritis Other    • Diabetes Other    • Hyperlipidemia Other    • Obesity Other    • Mental illness Other    • Heart attack Other        Social history:   Social History     Socioeconomic History   • Marital status:    Tobacco Use   • Smoking status: Never Smoker   • Smokeless tobacco: Never Used   Vaping Use   • Vaping Use: Never used   Substance and Sexual Activity   • Alcohol use: No   • Drug use: No   • Sexual activity: Yes     Partners: Male     Comment:        Review of Systems   Constitutional: Negative for activity change, appetite change, chills, diaphoresis, fatigue, fever and unexpected weight change.   HENT: Negative for congestion, dental problem, drooling, ear discharge, ear pain, facial swelling, hearing loss, mouth sores, nosebleeds, postnasal drip, rhinorrhea, sinus pressure, sinus pain, sneezing, sore throat, tinnitus, trouble swallowing and voice change.    Eyes: Negative for  "photophobia, pain, discharge, redness, itching and visual disturbance.   Respiratory: Negative for apnea, cough, choking, chest tightness, shortness of breath, wheezing and stridor.    Cardiovascular: Negative for chest pain, palpitations and leg swelling.   Gastrointestinal: Negative for abdominal distention, abdominal pain, anal bleeding, blood in stool, constipation, diarrhea, nausea, rectal pain and vomiting.   Endocrine: Negative for cold intolerance, heat intolerance, polydipsia, polyphagia and polyuria.   Genitourinary: Negative for decreased urine volume, difficulty urinating, dysuria, enuresis, flank pain, frequency, genital sores, hematuria and urgency.   Musculoskeletal: Negative for arthralgias, back pain, gait problem, joint swelling, myalgias, neck pain and neck stiffness.   Skin: Negative for color change, pallor, rash and wound.   Allergic/Immunologic: Negative for environmental allergies, food allergies and immunocompromised state.   Neurological: Negative for dizziness, tremors, seizures, syncope, facial asymmetry, speech difficulty, weakness, light-headedness, numbness and headaches.   Hematological: Negative for adenopathy. Does not bruise/bleed easily.   Psychiatric/Behavioral: Positive for dysphoric mood. Negative for agitation, behavioral problems, confusion, decreased concentration, hallucinations, self-injury, sleep disturbance and suicidal ideas. The patient is nervous/anxious. The patient is not hyperactive.    All other systems reviewed and are negative.      Objective   Blood pressure 132/85, pulse 95, resp. rate 20, height 157.5 cm (62\"), weight 86.2 kg (190 lb).  Body mass index is 34.75 kg/m².        Physical Exam      Assessment/Plan     This a 51-year-old woman who developed a pruritic rash and contact dermatitis following an uncomplicated L4-5 PLIF 3 weeks ago.  Her rash has improved, however she has 2 small open areas at the superior and inferior edge of her incision that are new. "  She has been liberally applying hydrocortisone cream directly over her incision.  I have advised her to stop applying directly over the incision line.  I have sent a wound culture of drainage I was able to express.  I closely reviewed the signs and symptoms of wound infection with her.  I will follow-up with her next week for a wound check, or sooner if clinically indicated.    Diagnoses and all orders for this visit:    1. S/P lumbar fusion (Primary)  -     Wound Culture - Wound, Spine, Lumbar; Future        Return in about 1 year (around 11/12/2022), or if symptoms worsen or fail to improve.             Judy Jackson PA-C

## 2021-11-15 LAB
BACTERIA SPEC AEROBE CULT: ABNORMAL
GRAM STN SPEC: ABNORMAL
GRAM STN SPEC: ABNORMAL

## 2021-11-15 RX ORDER — SULFAMETHOXAZOLE AND TRIMETHOPRIM 800; 160 MG/1; MG/1
1 TABLET ORAL 2 TIMES DAILY
Qty: 20 TABLET | Refills: 0 | Status: SHIPPED | OUTPATIENT
Start: 2021-11-15 | End: 2021-11-25

## 2021-11-17 ENCOUNTER — OFFICE VISIT (OUTPATIENT)
Dept: NEUROSURGERY | Facility: CLINIC | Age: 51
End: 2021-11-17

## 2021-11-17 VITALS
RESPIRATION RATE: 18 BRPM | WEIGHT: 189.2 LBS | HEIGHT: 62 IN | BODY MASS INDEX: 34.82 KG/M2 | DIASTOLIC BLOOD PRESSURE: 90 MMHG | SYSTOLIC BLOOD PRESSURE: 118 MMHG | HEART RATE: 105 BPM

## 2021-11-17 DIAGNOSIS — L24.A9 WOUND DRAINAGE: Primary | ICD-10-CM

## 2021-11-17 PROCEDURE — 99024 POSTOP FOLLOW-UP VISIT: CPT | Performed by: PHYSICIAN ASSISTANT

## 2021-11-17 NOTE — PROGRESS NOTES
Subjective     Chief Complaint: Wound check    Patient ID: Nita Mcmahon is a 51 y.o. female is here today for follow-up.    History of Present Illness    This is a 51-year-old woman who underwent an uncomplicated L4-5 PLIF on 10/19/2021.  We have been following her for wound drainage.  Wound culture was taken at her last office visit and came back positive for staph aureus, susceptible to Bactrim and she was placed on antibiotics.  She reports she continues to have some yellow-tinged drainage, particularly overnight.  Denies fevers, chills or other constitutional symptoms    The following portions of the patient's history were reviewed and updated as appropriate: allergies, current medications, past family history, past medical history, past social history, past surgical history and problem list.    Family history:   Family History   Problem Relation Age of Onset   • Arthritis Other    • Diabetes Other    • Hyperlipidemia Other    • Obesity Other    • Mental illness Other    • Heart attack Other        Social history:   Social History     Socioeconomic History   • Marital status:    Tobacco Use   • Smoking status: Never Smoker   • Smokeless tobacco: Never Used   Vaping Use   • Vaping Use: Never used   Substance and Sexual Activity   • Alcohol use: No   • Drug use: No   • Sexual activity: Yes     Partners: Male     Comment:        Review of Systems   Constitutional: Negative for activity change, appetite change, chills, diaphoresis, fatigue, fever and unexpected weight change.   HENT: Negative for congestion, dental problem, drooling, ear discharge, ear pain, facial swelling, hearing loss, mouth sores, nosebleeds, postnasal drip, rhinorrhea, sinus pressure, sinus pain, sneezing, sore throat, tinnitus, trouble swallowing and voice change.    Eyes: Negative for photophobia, pain, discharge, redness, itching and visual disturbance.   Respiratory: Negative for apnea, cough, choking, chest  "tightness, shortness of breath, wheezing and stridor.    Cardiovascular: Negative for chest pain, palpitations and leg swelling.   Gastrointestinal: Negative for abdominal distention, abdominal pain, anal bleeding, blood in stool, constipation, diarrhea, nausea, rectal pain and vomiting.   Endocrine: Negative for cold intolerance, heat intolerance, polydipsia, polyphagia and polyuria.   Genitourinary: Negative for decreased urine volume, difficulty urinating, dysuria, enuresis, flank pain, frequency, genital sores, hematuria and urgency.   Musculoskeletal: Negative for arthralgias, back pain, gait problem, joint swelling, myalgias, neck pain and neck stiffness.   Skin: Negative for color change, pallor, rash and wound.   Allergic/Immunologic: Negative for environmental allergies, food allergies and immunocompromised state.   Neurological: Negative for dizziness, tremors, seizures, syncope, facial asymmetry, speech difficulty, weakness, light-headedness, numbness and headaches.   Hematological: Negative for adenopathy. Does not bruise/bleed easily.   Psychiatric/Behavioral: Positive for dysphoric mood. Negative for agitation, behavioral problems, confusion, decreased concentration, hallucinations, self-injury, sleep disturbance and suicidal ideas. The patient is nervous/anxious. The patient is not hyperactive.    All other systems reviewed and are negative.      Objective   Blood pressure 118/90, pulse 105, resp. rate 18, height 157.5 cm (62\"), weight 85.8 kg (189 lb 3.2 oz).  Body mass index is 34.61 kg/m².  Patient's Body mass index is 34.61 kg/m². indicating that she is obese (BMI >30). Obesity-related health conditions include the following: dyslipidemias. Obesity is unchanged. BMI is is above average; BMI management plan is completed. We discussed portion control and increasing exercise..      Physical Exam      Assessment/Plan     Continue to monitor wound for signs/symptoms of infection.  These were reviewed " with her  Continue antibiotics as prescribed  I will check some labs-CBC, ESR, CRP and procalcitonin.  I will call her with results.  Follow-up 1 week for wound check, or sooner if clinical indicated    Diagnoses and all orders for this visit:    1. Wound drainage (Primary)  -     CBC & Differential; Future  -     Sedimentation Rate  -     C-reactive Protein; Future  -     Procalcitonin; Future  -     Procalcitonin  -     C-reactive Protein  -     CBC & Differential        Return in about 1 week (around 11/24/2021), or if symptoms worsen or fail to improve.             Judy Jackson PA-C

## 2021-11-19 LAB
BASOPHILS # BLD AUTO: 0.08 10*3/MM3 (ref 0–0.2)
BASOPHILS NFR BLD AUTO: 1 % (ref 0–1.5)
CRP SERPL-MCNC: <0.3 MG/DL (ref 0–0.5)
EOSINOPHIL # BLD AUTO: 0.15 10*3/MM3 (ref 0–0.4)
EOSINOPHIL NFR BLD AUTO: 1.8 % (ref 0.3–6.2)
ERYTHROCYTE [DISTWIDTH] IN BLOOD BY AUTOMATED COUNT: 12.4 % (ref 12.3–15.4)
ERYTHROCYTE [SEDIMENTATION RATE] IN BLOOD BY WESTERGREN METHOD: 15 MM/HR (ref 0–30)
HCT VFR BLD AUTO: 40.6 % (ref 34–46.6)
HGB BLD-MCNC: 13.5 G/DL (ref 12–15.9)
IMM GRANULOCYTES # BLD AUTO: 0.01 10*3/MM3 (ref 0–0.05)
IMM GRANULOCYTES NFR BLD AUTO: 0.1 % (ref 0–0.5)
LYMPHOCYTES # BLD AUTO: 3.34 10*3/MM3 (ref 0.7–3.1)
LYMPHOCYTES NFR BLD AUTO: 40.3 % (ref 19.6–45.3)
MCH RBC QN AUTO: 30 PG (ref 26.6–33)
MCHC RBC AUTO-ENTMCNC: 33.3 G/DL (ref 31.5–35.7)
MCV RBC AUTO: 90.2 FL (ref 79–97)
MONOCYTES # BLD AUTO: 0.73 10*3/MM3 (ref 0.1–0.9)
MONOCYTES NFR BLD AUTO: 8.8 % (ref 5–12)
NEUTROPHILS # BLD AUTO: 3.98 10*3/MM3 (ref 1.7–7)
NEUTROPHILS NFR BLD AUTO: 48 % (ref 42.7–76)
NRBC BLD AUTO-RTO: 0 /100 WBC (ref 0–0.2)
PLATELET # BLD AUTO: 416 10*3/MM3 (ref 140–450)
PROCALCITONIN SERPL-MCNC: 0.04 NG/ML (ref 0–0.08)
RBC # BLD AUTO: 4.5 10*6/MM3 (ref 3.77–5.28)
WBC # BLD AUTO: 8.29 10*3/MM3 (ref 3.4–10.8)

## 2021-11-20 ENCOUNTER — PATIENT MESSAGE (OUTPATIENT)
Dept: NEUROSURGERY | Facility: CLINIC | Age: 51
End: 2021-11-20

## 2021-11-22 ENCOUNTER — TELEPHONE (OUTPATIENT)
Dept: NEUROSURGERY | Facility: CLINIC | Age: 51
End: 2021-11-22

## 2021-11-22 NOTE — TELEPHONE ENCOUNTER
Provider:  Scott  Caller: Karuna- My chart msg  Time of call:   11/20/21 8:16p  Surgery:  PLIF L4-5  Surgery Date:  10/19/21  Last visit:  Office Visit with Judy Jackson PA-C (11/17/2021)  Next visit: 11/24/21       Reason for call:       ---- Message from Nita Mcmahon sent at 11/20/2021  8:16 PM EST -----  Regarding: Question regarding CBC WITH DIFFERENTIAL  What do these results tell you? Will the antibiotic I’m currently taking clear up this infection?

## 2021-11-24 ENCOUNTER — TELEPHONE (OUTPATIENT)
Dept: NEUROSURGERY | Facility: CLINIC | Age: 51
End: 2021-11-24

## 2021-11-24 ENCOUNTER — OFFICE VISIT (OUTPATIENT)
Dept: NEUROSURGERY | Facility: CLINIC | Age: 51
End: 2021-11-24

## 2021-11-24 VITALS
WEIGHT: 191 LBS | BODY MASS INDEX: 35.15 KG/M2 | SYSTOLIC BLOOD PRESSURE: 138 MMHG | HEIGHT: 62 IN | DIASTOLIC BLOOD PRESSURE: 88 MMHG

## 2021-11-24 DIAGNOSIS — Z98.1 S/P LUMBAR FUSION: Primary | ICD-10-CM

## 2021-11-24 PROCEDURE — 99024 POSTOP FOLLOW-UP VISIT: CPT | Performed by: PHYSICIAN ASSISTANT

## 2021-11-24 RX ORDER — VENLAFAXINE HYDROCHLORIDE 37.5 MG/1
CAPSULE, EXTENDED RELEASE ORAL
COMMUNITY
Start: 2021-11-14 | End: 2021-12-13

## 2021-11-24 NOTE — PROGRESS NOTES
Subjective     Chief Complaint: Wound check    Patient ID: Nita Mcmahon is a 51 y.o. female is here today for follow-up.    History of Present Illness    This is a 51-year-old woman who underwent uncomplicated L4-5 PLIF on 10/19/2021.  She had some wound drainage and a culture was taken which came back positive for staph aureus.  She was placed on Bactrim.  Her lab results thus far have been benign.  She denies any fevers, chills or other constitutional symptoms.  She has had a very small amount of serous drainage from her incision a few days over the last week.  She reports she is feeling excellent, has had great relief of her preoperative leg pain.    The following portions of the patient's history were reviewed and updated as appropriate: allergies, current medications, past family history, past medical history, past social history, past surgical history and problem list.    Family history:   Family History   Problem Relation Age of Onset   • Arthritis Other    • Diabetes Other    • Hyperlipidemia Other    • Obesity Other    • Mental illness Other    • Heart attack Other        Social history:   Social History     Socioeconomic History   • Marital status:    Tobacco Use   • Smoking status: Never Smoker   • Smokeless tobacco: Never Used   Vaping Use   • Vaping Use: Never used   Substance and Sexual Activity   • Alcohol use: No   • Drug use: No   • Sexual activity: Yes     Partners: Male     Comment:        Review of Systems   Constitutional: Negative for activity change, appetite change, chills, diaphoresis, fatigue, fever and unexpected weight change.   HENT: Negative for congestion, dental problem, drooling, ear discharge, ear pain, facial swelling, hearing loss, mouth sores, nosebleeds, postnasal drip, rhinorrhea, sinus pressure, sinus pain, sneezing, sore throat, tinnitus, trouble swallowing and voice change.    Eyes: Negative for photophobia, pain, discharge, redness, itching and visual  "disturbance.   Respiratory: Negative for apnea, cough, choking, chest tightness, shortness of breath, wheezing and stridor.    Cardiovascular: Negative for chest pain, palpitations and leg swelling.   Gastrointestinal: Negative for abdominal distention, abdominal pain, anal bleeding, blood in stool, constipation, diarrhea, nausea, rectal pain and vomiting.   Endocrine: Negative for cold intolerance, heat intolerance, polydipsia, polyphagia and polyuria.   Genitourinary: Negative for decreased urine volume, difficulty urinating, dysuria, enuresis, flank pain, frequency, genital sores, hematuria and urgency.   Musculoskeletal: Negative for arthralgias, back pain, gait problem, joint swelling, myalgias, neck pain and neck stiffness.   Skin: Positive for wound. Negative for color change, pallor and rash.   Allergic/Immunologic: Negative for environmental allergies, food allergies and immunocompromised state.   Neurological: Negative for dizziness, tremors, seizures, syncope, facial asymmetry, speech difficulty, weakness, light-headedness, numbness and headaches.   Hematological: Negative for adenopathy. Does not bruise/bleed easily.   Psychiatric/Behavioral: Positive for dysphoric mood. Negative for agitation, behavioral problems, confusion, decreased concentration, hallucinations, self-injury, sleep disturbance and suicidal ideas. The patient is nervous/anxious. The patient is not hyperactive.    All other systems reviewed and are negative.      Objective   Blood pressure 138/88, height 157.5 cm (62\"), weight 86.6 kg (191 lb).  Body mass index is 34.93 kg/m².        Physical Exam  Constitutional:       Appearance: Normal appearance.   HENT:      Head: Normocephalic and atraumatic.   Pulmonary:      Effort: Pulmonary effort is normal.   Skin:     General: Skin is warm and dry.      Comments:   Wound edges are well approximated.  There is no erythema, fluctuance, active drainage or drainage able to be expressed. "   Neurological:      Mental Status: She is alert.      GCS: GCS eye subscore is 4. GCS verbal subscore is 5. GCS motor subscore is 6.      Motor: Motor function is intact.      Gait: Gait is intact.   Psychiatric:         Mood and Affect: Mood normal.         Behavior: Behavior normal.           Assessment/Plan     This is a 51-year-old woman that we have been following for some wound drainage.  Her wound is healing well at this point with no signs/symptoms of wound infection.  I reviewed these with her and symptoms to look out for.  She may begin with physical therapy.  She will return to work as a  next week.  We will see her back in 6-8 weeks with x-rays after PT, or sooner if clinically indicated.    Diagnoses and all orders for this visit:    1. S/P lumbar fusion (Primary)  -     Ambulatory Referral to Physical Therapy Evaluate and treat  -     XR Spine Lumbar AP & Lateral        Return in about 2 months (around 1/24/2022), or if symptoms worsen or fail to improve.             Judy Jackson PA-C

## 2021-12-13 ENCOUNTER — OFFICE VISIT (OUTPATIENT)
Dept: INTERNAL MEDICINE | Facility: CLINIC | Age: 51
End: 2021-12-13

## 2021-12-13 VITALS
TEMPERATURE: 96.6 F | WEIGHT: 191.4 LBS | BODY MASS INDEX: 35.22 KG/M2 | HEIGHT: 62 IN | SYSTOLIC BLOOD PRESSURE: 124 MMHG | DIASTOLIC BLOOD PRESSURE: 68 MMHG

## 2021-12-13 DIAGNOSIS — M47.816 SPONDYLOSIS OF LUMBAR REGION WITHOUT MYELOPATHY OR RADICULOPATHY: ICD-10-CM

## 2021-12-13 DIAGNOSIS — F41.9 ANXIETY AND DEPRESSION: Primary | ICD-10-CM

## 2021-12-13 DIAGNOSIS — F32.A ANXIETY AND DEPRESSION: Primary | ICD-10-CM

## 2021-12-13 PROCEDURE — 99213 OFFICE O/P EST LOW 20 MIN: CPT | Performed by: INTERNAL MEDICINE

## 2021-12-13 RX ORDER — VENLAFAXINE HYDROCHLORIDE 75 MG/1
75 CAPSULE, EXTENDED RELEASE ORAL DAILY
Qty: 90 CAPSULE | Refills: 3 | Status: SHIPPED | OUTPATIENT
Start: 2021-12-13 | End: 2022-06-03 | Stop reason: SDUPTHER

## 2021-12-13 NOTE — PROGRESS NOTES
"Subjective   Nita Mcmahon is a 51 y.o. female here for follow-up anxiety and depression and recent lumbar back surgery.  Patient says throughout her recovery from the back surgery, she was having crying spells, was not really in pain and could not explain what was going on with her.  Her Effexor was increased to 75 mg and she has done better on this.  She would like to continue at this dose.  Says back pain has much improved, able to do think she has not been able to do in years.  She can walk around the whole perimeter of her work and go up and down stairs without pain.  She is very pleased.  She follows with Dr. Chavez.  Still taking Mobic daily.    I have reviewed the following portions of the patient's history and confirmed they are accurate: current medications, past medical history, past social history and problem list     I have personally reviewed and performed the ROS. Mary Dixon MD     Review of Systems:  General: negative  Neuro: negative  Psych: negative    Objective   /68   Temp 96.6 °F (35.9 °C) (Temporal)   Ht 157.5 cm (62\")   Wt 86.8 kg (191 lb 6.4 oz)   BMI 35.01 kg/m²     Physical Exam  Vitals reviewed.   Constitutional:       Appearance: She is well-developed.   Pulmonary:      Effort: Pulmonary effort is normal.   Skin:     General: Skin is warm and dry.   Neurological:      Mental Status: She is alert and oriented to person, place, and time.   Psychiatric:         Behavior: Behavior normal.         Thought Content: Thought content normal.         Judgment: Judgment normal.         Assessment/Plan   Diagnoses and all orders for this visit:    1. Anxiety and depression (Primary)  -     venlafaxine XR (Effexor XR) 75 MG 24 hr capsule; Take 1 capsule by mouth Daily.  Dispense: 90 capsule; Refill: 3  -improved sx on increased dose, continue at 75mg    2. Spondylosis of lumbar region without myelopathy or radiculopathy  -improved, reviewed various NS notes, labs, " photos of skin reaction           Mary Dixon MD

## 2022-01-26 ENCOUNTER — HOSPITAL ENCOUNTER (OUTPATIENT)
Dept: GENERAL RADIOLOGY | Facility: HOSPITAL | Age: 52
Discharge: HOME | End: 2022-01-26
Admitting: PHYSICIAN ASSISTANT

## 2022-01-26 ENCOUNTER — OFFICE VISIT (OUTPATIENT)
Dept: NEUROSURGERY | Facility: CLINIC | Age: 52
End: 2022-01-26

## 2022-01-26 VITALS — BODY MASS INDEX: 35.81 KG/M2 | TEMPERATURE: 97.8 F | HEIGHT: 62 IN | WEIGHT: 194.6 LBS

## 2022-01-26 DIAGNOSIS — M43.16 SPONDYLOLISTHESIS, LUMBAR REGION: Primary | ICD-10-CM

## 2022-01-26 PROCEDURE — 72100 X-RAY EXAM L-S SPINE 2/3 VWS: CPT

## 2022-01-26 PROCEDURE — 99024 POSTOP FOLLOW-UP VISIT: CPT | Performed by: PHYSICIAN ASSISTANT

## 2022-01-26 NOTE — PROGRESS NOTES
Subjective     Chief Complaint: Follow-up lumbar fusion    Patient ID: Nita Mcmahon is a 51 y.o. female is here today for follow-up.    History of Present Illness    This is a 51-year-old who underwent uncomplicated L4-5 PLIF in October 2021.  She presents today in routine follow-up.  She has completed physical therapy.  She denies any lower extremity pain.  She is extremely pleased with her surgical outcome.    The following portions of the patient's history were reviewed and updated as appropriate: allergies, current medications, past family history, past medical history, past social history, past surgical history and problem list.    Family history:   Family History   Problem Relation Age of Onset   • Arthritis Other    • Diabetes Other    • Hyperlipidemia Other    • Obesity Other    • Mental illness Other    • Heart attack Other        Social history:   Social History     Socioeconomic History   • Marital status:    Tobacco Use   • Smoking status: Never Smoker   • Smokeless tobacco: Never Used   Vaping Use   • Vaping Use: Never used   Substance and Sexual Activity   • Alcohol use: No   • Drug use: No   • Sexual activity: Defer     Partners: Male     Comment:        Review of Systems   Constitutional: Negative for activity change, appetite change, chills, diaphoresis, fatigue, fever and unexpected weight change.   HENT: Negative for congestion, dental problem, drooling, ear discharge, ear pain, facial swelling, hearing loss, mouth sores, nosebleeds, postnasal drip, rhinorrhea, sinus pressure, sinus pain, sneezing, sore throat, tinnitus, trouble swallowing and voice change.    Eyes: Negative for photophobia, pain, discharge, redness, itching and visual disturbance.   Respiratory: Negative for apnea, cough, choking, chest tightness, shortness of breath, wheezing and stridor.    Cardiovascular: Negative for chest pain, palpitations and leg swelling.   Gastrointestinal: Negative for abdominal  "distention, abdominal pain, anal bleeding, blood in stool, constipation, diarrhea, nausea, rectal pain and vomiting.   Endocrine: Negative for cold intolerance, heat intolerance, polydipsia, polyphagia and polyuria.   Genitourinary: Negative for decreased urine volume, difficulty urinating, dysuria, enuresis, flank pain, frequency, genital sores, hematuria and urgency.   Musculoskeletal: Negative for arthralgias, back pain, gait problem, joint swelling, myalgias, neck pain and neck stiffness.   Skin: Negative for color change, pallor, rash and wound.   Allergic/Immunologic: Negative for environmental allergies, food allergies and immunocompromised state.   Neurological: Negative for dizziness, tremors, seizures, syncope, facial asymmetry, speech difficulty, weakness, light-headedness, numbness and headaches.   Hematological: Negative for adenopathy. Does not bruise/bleed easily.   Psychiatric/Behavioral: Negative for agitation, behavioral problems, confusion, decreased concentration, dysphoric mood, hallucinations, self-injury, sleep disturbance and suicidal ideas. The patient is not nervous/anxious and is not hyperactive.    All other systems reviewed and are negative.      Objective   Temperature 97.8 °F (36.6 °C), height 157.5 cm (62.01\"), weight 88.3 kg (194 lb 9.6 oz).  Body mass index is 35.58 kg/m².  Patient's Body mass index is 35.58 kg/m². indicating that she is obese (BMI >30). Obesity-related health conditions include the following: osteoarthritis. Obesity is unchanged. BMI is is above average; BMI management plan is completed. We discussed portion control and increasing exercise..      Physical Exam  Constitutional:       Appearance: Normal appearance.   HENT:      Head: Normocephalic and atraumatic.   Pulmonary:      Effort: Pulmonary effort is normal.   Skin:     General: Skin is warm and dry.      Comments: Well-healed surgical incision   Neurological:      General: No focal deficit present.      " Mental Status: She is alert and oriented to person, place, and time.   Psychiatric:         Mood and Affect: Mood normal.         Behavior: Behavior normal.           Assessment/Plan       51-year-old woman status post L4-5 PLIF in October 2021.  She has completed physical therapy.  She is extremely pleased with her surgical outcome.  I reviewed signs and symptoms of when to follow-up with our office.  Otherwise, we will be happy to see her back on an as-needed basis moving forward.    Diagnoses and all orders for this visit:    1. Spondylolisthesis, lumbar region (Primary)        Return if symptoms worsen or fail to improve.             Judy Jackson PA-C

## 2022-02-08 RX ORDER — ESTRADIOL 1 MG/1
TABLET ORAL
Qty: 90 TABLET | Refills: 2 | Status: SHIPPED | OUTPATIENT
Start: 2022-02-08 | End: 2022-06-03 | Stop reason: SDUPTHER

## 2022-06-03 ENCOUNTER — OFFICE VISIT (OUTPATIENT)
Dept: INTERNAL MEDICINE | Facility: CLINIC | Age: 52
End: 2022-06-03

## 2022-06-03 VITALS
HEIGHT: 62 IN | DIASTOLIC BLOOD PRESSURE: 74 MMHG | BODY MASS INDEX: 34.04 KG/M2 | TEMPERATURE: 97.3 F | SYSTOLIC BLOOD PRESSURE: 122 MMHG | WEIGHT: 185 LBS

## 2022-06-03 DIAGNOSIS — M19.90 ARTHRITIS: ICD-10-CM

## 2022-06-03 DIAGNOSIS — F32.A ANXIETY AND DEPRESSION: ICD-10-CM

## 2022-06-03 DIAGNOSIS — L08.9 SKIN INFECTION: Primary | ICD-10-CM

## 2022-06-03 DIAGNOSIS — F41.9 ANXIETY AND DEPRESSION: ICD-10-CM

## 2022-06-03 PROCEDURE — 99213 OFFICE O/P EST LOW 20 MIN: CPT | Performed by: INTERNAL MEDICINE

## 2022-06-03 RX ORDER — VENLAFAXINE HYDROCHLORIDE 75 MG/1
75 CAPSULE, EXTENDED RELEASE ORAL DAILY
Qty: 90 CAPSULE | Refills: 3 | Status: SHIPPED | OUTPATIENT
Start: 2022-06-03

## 2022-06-03 RX ORDER — ESTRADIOL 1 MG/1
1 TABLET ORAL DAILY
Qty: 90 TABLET | Refills: 2 | Status: SHIPPED | OUTPATIENT
Start: 2022-06-03

## 2022-06-03 RX ORDER — SULFAMETHOXAZOLE AND TRIMETHOPRIM 800; 160 MG/1; MG/1
1 TABLET ORAL 2 TIMES DAILY
Qty: 14 TABLET | Refills: 0 | OUTPATIENT
Start: 2022-06-03 | End: 2022-07-13

## 2022-06-03 RX ORDER — MELOXICAM 15 MG/1
15 TABLET ORAL DAILY
Qty: 90 TABLET | Refills: 3 | Status: SHIPPED | OUTPATIENT
Start: 2022-06-03 | End: 2023-01-30

## 2022-06-03 NOTE — PROGRESS NOTES
"Subjective   Nita Mcmahon is a 52 y.o. female here for skin infection and needs refill of her chronic meds which are working well.  Says she did S&S nails at home and they started to grow out.  Her  used a Dremel to even out the nails and every time he used it he nicked her cuticles.  Now most of them are red and have a yellowish serous discharge.  They are very itchy as well.    I have reviewed the following portions of the patient's history and confirmed they are accurate: current medications, past medical history, past social history and problem list     I have personally reviewed and performed the ROS. Mary Dixon MD     Review of Systems:  General: negative  Skin: See HPI    Objective   /74   Temp 97.3 °F (36.3 °C) (Temporal)   Ht 157.5 cm (62\")   Wt 83.9 kg (185 lb)   BMI 33.84 kg/m²     Physical Exam  Vitals reviewed.   Constitutional:       Appearance: She is well-developed.   Pulmonary:      Effort: Pulmonary effort is normal.   Skin:     General: Skin is warm and dry.      Comments: Most cuticle areas on fingers with erythema and serous drainage, no humberto pus seen   Neurological:      Mental Status: She is alert and oriented to person, place, and time.   Psychiatric:         Behavior: Behavior normal.         Thought Content: Thought content normal.         Judgment: Judgment normal.         Assessment & Plan   Diagnoses and all orders for this visit:    1. Skin infection (Primary)  -     sulfamethoxazole-trimethoprim (Bactrim DS) 800-160 MG per tablet; Take 1 tablet by mouth 2 (Two) Times a Day.  Dispense: 14 tablet; Refill: 0  -Advised her to put Vaseline over the open areas             Mary Dixon MD  "

## 2022-08-24 ENCOUNTER — TELEPHONE (OUTPATIENT)
Dept: INTERNAL MEDICINE | Facility: CLINIC | Age: 52
End: 2022-08-24

## 2022-08-24 NOTE — TELEPHONE ENCOUNTER
Caller: Nita Mcmahon    Relationship to patient: Self    Best call back number: [phone]    Date of exposure: SOMETIME BETWEEN THE 08/15/2022 AND  08/19/2022    Date of positive COVID19 test: 08/22/2022 WITH HOME TEST         COVID19 symptoms: COUGH WITH PHLEM HEADACHE SWEATS FEVER BODY ACHES FATIGUE DIARRHEA SOME SHORTNESS OF BREATH         Additional information or concerns: THE PATIENT STATES THAT SHE IS FEELING WORSE SHE TESTED POSITIVE ON 08/22/2022 THE PATIENT WOULD LIKE TO KNOW IF THE DOCTOR CAN CALL IN A MEDICATION TO TREAT HER SYMPTOMS     What is the patients preferred pharmacy: [facility]

## 2022-08-24 NOTE — TELEPHONE ENCOUNTER
What is she looking for exactly?  I do not see where she would qualify for the antiviral drugs.  Her BMI is not more than 35.

## 2022-09-20 PROCEDURE — 87086 URINE CULTURE/COLONY COUNT: CPT | Performed by: NURSE PRACTITIONER

## 2022-12-05 ENCOUNTER — OFFICE VISIT (OUTPATIENT)
Dept: INTERNAL MEDICINE | Facility: CLINIC | Age: 52
End: 2022-12-05

## 2022-12-05 ENCOUNTER — LAB (OUTPATIENT)
Dept: LAB | Facility: HOSPITAL | Age: 52
End: 2022-12-05

## 2022-12-05 VITALS
DIASTOLIC BLOOD PRESSURE: 84 MMHG | OXYGEN SATURATION: 98 % | HEIGHT: 62 IN | WEIGHT: 186 LBS | HEART RATE: 74 BPM | BODY MASS INDEX: 34.23 KG/M2 | RESPIRATION RATE: 16 BRPM | TEMPERATURE: 98.2 F | SYSTOLIC BLOOD PRESSURE: 132 MMHG

## 2022-12-05 DIAGNOSIS — Z00.00 HEALTH CARE MAINTENANCE: ICD-10-CM

## 2022-12-05 DIAGNOSIS — F32.A ANXIETY AND DEPRESSION: ICD-10-CM

## 2022-12-05 DIAGNOSIS — T78.40XA ALLERGY, INITIAL ENCOUNTER: Primary | ICD-10-CM

## 2022-12-05 DIAGNOSIS — Z11.59 ENCOUNTER FOR HEPATITIS C SCREENING TEST FOR LOW RISK PATIENT: ICD-10-CM

## 2022-12-05 DIAGNOSIS — F41.9 ANXIETY AND DEPRESSION: ICD-10-CM

## 2022-12-05 LAB
ALBUMIN SERPL-MCNC: 4.1 G/DL (ref 3.5–5.2)
ALBUMIN/GLOB SERPL: 1.7 G/DL
ALP SERPL-CCNC: 65 U/L (ref 39–117)
ALT SERPL W P-5'-P-CCNC: 17 U/L (ref 1–33)
ANION GAP SERPL CALCULATED.3IONS-SCNC: 8.7 MMOL/L (ref 5–15)
AST SERPL-CCNC: 17 U/L (ref 1–32)
BILIRUB SERPL-MCNC: 0.4 MG/DL (ref 0–1.2)
BUN SERPL-MCNC: 14 MG/DL (ref 6–20)
BUN/CREAT SERPL: 20.6 (ref 7–25)
CALCIUM SPEC-SCNC: 9.3 MG/DL (ref 8.6–10.5)
CHLORIDE SERPL-SCNC: 104 MMOL/L (ref 98–107)
CHOLEST SERPL-MCNC: 231 MG/DL (ref 0–200)
CO2 SERPL-SCNC: 28.3 MMOL/L (ref 22–29)
CREAT SERPL-MCNC: 0.68 MG/DL (ref 0.57–1)
DEPRECATED RDW RBC AUTO: 40.2 FL (ref 37–54)
EGFRCR SERPLBLD CKD-EPI 2021: 103.6 ML/MIN/1.73
ERYTHROCYTE [DISTWIDTH] IN BLOOD BY AUTOMATED COUNT: 12.6 % (ref 12.3–15.4)
GLOBULIN UR ELPH-MCNC: 2.4 GM/DL
GLUCOSE SERPL-MCNC: 90 MG/DL (ref 65–99)
HCT VFR BLD AUTO: 39.8 % (ref 34–46.6)
HCV AB SER DONR QL: NORMAL
HDLC SERPL-MCNC: 55 MG/DL (ref 40–60)
HGB BLD-MCNC: 13.6 G/DL (ref 12–15.9)
LDLC SERPL CALC-MCNC: 150 MG/DL (ref 0–100)
LDLC/HDLC SERPL: 2.67 {RATIO}
MCH RBC QN AUTO: 29.8 PG (ref 26.6–33)
MCHC RBC AUTO-ENTMCNC: 34.2 G/DL (ref 31.5–35.7)
MCV RBC AUTO: 87.1 FL (ref 79–97)
PLATELET # BLD AUTO: 351 10*3/MM3 (ref 140–450)
PMV BLD AUTO: 10.2 FL (ref 6–12)
POTASSIUM SERPL-SCNC: 4.6 MMOL/L (ref 3.5–5.2)
PROT SERPL-MCNC: 6.5 G/DL (ref 6–8.5)
RBC # BLD AUTO: 4.57 10*6/MM3 (ref 3.77–5.28)
SODIUM SERPL-SCNC: 141 MMOL/L (ref 136–145)
TRIGL SERPL-MCNC: 146 MG/DL (ref 0–150)
VLDLC SERPL-MCNC: 26 MG/DL (ref 5–40)
WBC NRBC COR # BLD: 6.97 10*3/MM3 (ref 3.4–10.8)

## 2022-12-05 PROCEDURE — 86803 HEPATITIS C AB TEST: CPT | Performed by: INTERNAL MEDICINE

## 2022-12-05 PROCEDURE — 80053 COMPREHEN METABOLIC PANEL: CPT | Performed by: INTERNAL MEDICINE

## 2022-12-05 PROCEDURE — 85027 COMPLETE CBC AUTOMATED: CPT | Performed by: INTERNAL MEDICINE

## 2022-12-05 PROCEDURE — 99214 OFFICE O/P EST MOD 30 MIN: CPT | Performed by: INTERNAL MEDICINE

## 2022-12-05 PROCEDURE — 80061 LIPID PANEL: CPT | Performed by: INTERNAL MEDICINE

## 2022-12-05 NOTE — PROGRESS NOTES
"Subjective   Nita Mcmahon is a 52 y.o. female here for follow-up anxiety and depression, allergy.  Patient had back surgery and had a reaction to the tape which caused extensive and severe contact dermatitis.  It went away and she recently went on a cruise and she developed severe contact dermatitis on her anterior thighs and around her elbows.  She had been using Voltaren gel in that area but has since not used it.  She still feels like she is having allergy as she has some swelling around the eyes.  She has an appointment with an allergist.  Says mood has been great, back pain has also been good    I have reviewed the following portions of the patient's history and confirmed they are accurate: allergies, current medications, past medical history, past social history and problem list     I have personally reviewed and performed the ROS. Mary Dixon MD     Review of Systems:  General: negative  Skin: No rash  Psych: negative  Allergy: Environmental    Objective   /84   Pulse 74   Temp 98.2 °F (36.8 °C) (Tympanic)   Resp 16   Ht 157.5 cm (62\")   Wt 84.4 kg (186 lb)   SpO2 98%   BMI 34.02 kg/m²     Physical Exam  Vitals reviewed.   Constitutional:       Appearance: She is well-developed.   Pulmonary:      Effort: Pulmonary effort is normal.   Skin:     General: Skin is warm and dry.      Comments: Mild swelling of upper periorbital area   Neurological:      Mental Status: She is alert and oriented to person, place, and time.   Psychiatric:         Behavior: Behavior normal.         Thought Content: Thought content normal.         Judgment: Judgment normal.         Assessment & Plan   Diagnoses and all orders for this visit:    1. Allergy, initial encounter (Primary)  -allergy testing recommended, has appt  -continue Xyzal, add famotidine    2. Health care maintenance  -     CBC (No Diff)  -     Comprehensive Metabolic Panel  -     Lipid Panel    3. Encounter for hepatitis C screening " test for low risk patient  -     Hepatitis C Antibody    4. Anxiety and depression  -Chronic stable, continue current medications    Reviewed last set of lab       Mary Dixon MD

## 2022-12-22 ENCOUNTER — TELEPHONE (OUTPATIENT)
Dept: INTERNAL MEDICINE | Facility: CLINIC | Age: 52
End: 2022-12-22

## 2022-12-22 NOTE — TELEPHONE ENCOUNTER
Caller: Nita Mcmahon     Relationship: SELF    Best call back number: [phone]- OK TO LEAVE DETAILED MESSAGE IF NO ANSWER    What is your medical concern? ALLERGY PROBLEMS  URGENT CARE FOLLOW UP 12/10/22, EYE NEARLY SWOLLEN SHUT, COMPLETED PREDNISONE- GOT BETTER, THEN A COUPLE OF DAYS AGO THE EYE BEGAN SWELLING AGAIN-  RIGHT  EYE SWOLLEN, RED, AND ITCHY    PICTURE OF EYE SENT VIA [facility]    How long has this issue been going on? 12/10/22    Is your provider already aware of this issue? NO, WAS SEEN IN URGENT CARE [facility]    Have you been treated for this issue? YES, BUT HAVING A NEW FLARE UP      PHARMACY: [facility] [address] - [phone] [facility] [phone] FX      CURRENTLY SCHEDULED FOR [facility] VIDEO VISIT 12/23/22

## 2022-12-23 ENCOUNTER — TELEMEDICINE (OUTPATIENT)
Dept: INTERNAL MEDICINE | Facility: CLINIC | Age: 52
End: 2022-12-23

## 2022-12-23 DIAGNOSIS — H10.11 ALLERGIC CONJUNCTIVITIS OF RIGHT EYE: ICD-10-CM

## 2022-12-23 DIAGNOSIS — H01.001 BLEPHARITIS OF RIGHT UPPER EYELID, UNSPECIFIED TYPE: Primary | ICD-10-CM

## 2022-12-23 PROCEDURE — 99213 OFFICE O/P EST LOW 20 MIN: CPT | Performed by: PHYSICIAN ASSISTANT

## 2022-12-23 RX ORDER — KETOTIFEN FUMARATE 0.35 MG/ML
1 SOLUTION/ DROPS OPHTHALMIC 2 TIMES DAILY
Qty: 10 ML | Refills: 1 | Status: SHIPPED | OUTPATIENT
Start: 2022-12-23

## 2022-12-23 RX ORDER — ERYTHROMYCIN 5 MG/G
OINTMENT OPHTHALMIC NIGHTLY
Qty: 1 G | Refills: 0 | Status: SHIPPED | OUTPATIENT
Start: 2022-12-23

## 2022-12-23 NOTE — PROGRESS NOTES
[facility]  [address]  [place] [address]  Dept: [phone]  Dept Fax: [phone]  Loc: [phone]  Loc Fax: [phone]    Nita Mcmahon  1970    Televisit Note    You have chosen to receive care through a telephone visit. Do you consent to use a telephone visit for your medical care today? Yes. Pt at home and provider working from home during visit today.    History of Present Illness:  Nita Mcmahon is a 52 y.o. female who presents via phone due to technical difficulties for irritated eyelid and pinkeye. Pt has been to urgent care for this and was given PO abx and steroids w/o any relief of sx. Pt d/t allergist soon. Pt still having itchy, swollen, and red R upper eyelid and pinkness to her conjunctiva.    The following portions of the patient's history were reviewed and updated as appropriate: allergies, current medications, past family history, past medical history, past social history, past surgical history, and problem list.    This visit was scheduled as a phone visit to comply with patient safety concerns in accordance with CDC recommendations.       Medications    Current Outpatient Medications:   •  albuterol sulfate HFA (ProAir HFA) 108 (90 Base) MCG/ACT inhaler, Inhale 1 puff 4 (Four) Times a Day. (Patient taking differently: Inhale 1 puff At Night As Needed.), Disp: 18 g, Rfl: 11  •  erythromycin (ROMYCIN) 5 MG/GM ophthalmic ointment, Administer  to the right eye Every Night., Disp: 1 g, Rfl: 0  •  estradiol (ESTRACE) 1 MG tablet, Take 1 tablet by mouth Daily., Disp: 90 tablet, Rfl: 2  •  fluticasone (FLONASE) 50 MCG/ACT nasal spray, 2 sprays into the nostril(s) as directed by provider At Night As Needed for Rhinitis or Allergies., Disp: , Rfl:   •  ketotifen (Zaditor) 0.025 % ophthalmic solution, Administer 1 drop to the right eye 2 (Two) Times a Day., Disp: 10 mL, Rfl: 1  •  levocetirizine (XYZAL) 5 MG tablet, Take 5  mg by mouth Every Evening., Disp: , Rfl:   •  meloxicam (MOBIC) 15 MG tablet, Take 1 tablet by mouth Daily., Disp: 90 tablet, Rfl: 3  •  multivitamin with minerals tablet tablet, Take 1 tablet by mouth Daily., Disp: , Rfl:   •  venlafaxine XR (Effexor XR) 75 MG 24 hr capsule, Take 1 capsule by mouth Daily., Disp: 90 capsule, Rfl: 3    Subjective  Allergies   Allergen Reactions   • Avelox  [Moxifloxacin Hcl] Anaphylaxis   • Tramadol Mental Status Change     Patient notes very emotional   • Latex Rash   • Morphine Itching   • Wound Dressing Adhesive Rash        Past Medical History:   Diagnosis Date   • Asthma    • Diverticulosis    • Elevated cholesterol    • GERD (gastroesophageal reflux disease)    • History of esophageal reflux    • History of transfusion     NO REACTION   • Muscle spasm of back        Past Surgical History:   Procedure Laterality Date   • ABDOMINAL SURGERY      scar tissue removal   • APPENDECTOMY     •  SECTION     • COLONOSCOPY     • ENDOSCOPY     • HERNIA REPAIR      UMBILICAL   • HYSTERECTOMY     • LUMBAR DISCECTOMY FUSION INSTRUMENTATION N/A 10/19/2021    Procedure: LUMBAR LAMINECTOMY POSTERIOR LUMBAR INTERBODY FUSION L4-5;  Surgeon: Primo Chavez MD;  Location: [facility];  Service: Neurosurgery;  Laterality: N/A;       Family History   Problem Relation Age of Onset   • Arthritis Other    • Diabetes Other    • Hyperlipidemia Other    • Obesity Other    • Mental illness Other    • Heart attack Other         Social History     Socioeconomic History   • Marital status:    Tobacco Use   • Smoking status: Never   • Smokeless tobacco: Never   Vaping Use   • Vaping Use: Never used   Substance and Sexual Activity   • Alcohol use: No   • Drug use: No   • Sexual activity: Defer     Partners: Male     Comment:          Objective  There were no vitals filed for this visit.  There is no height or weight on file to calculate BMI.    Assessment  Diagnoses and all  orders for this visit:    1. Blepharitis of right upper eyelid, unspecified type (Primary)  -     erythromycin (ROMYCIN) 5 MG/GM ophthalmic ointment; Administer  to the right eye Every Night.  Dispense: 1 g; Refill: 0    2. Allergic conjunctivitis of right eye  -     ketotifen (Zaditor) 0.025 % ophthalmic solution; Administer 1 drop to the right eye 2 (Two) Times a Day.  Dispense: 10 mL; Refill: 1        Plan    1. Blepharitis of right upper eyelid, unspecified type (Primary)- e-mycin ointment as directed.    2. Allergic conjunctivitis of right eye- sent Zaditor as directed. Follow up w/ allergist.      Return if symptoms worsen or fail to improve.    Sunday Calderon PA-C  12/23/2022

## 2023-01-28 DIAGNOSIS — M19.90 ARTHRITIS: ICD-10-CM

## 2023-01-30 RX ORDER — MELOXICAM 15 MG/1
15 TABLET ORAL DAILY
Qty: 90 TABLET | Refills: 3 | Status: SHIPPED | OUTPATIENT
Start: 2023-01-30

## 2023-02-22 ENCOUNTER — TELEPHONE (OUTPATIENT)
Dept: INTERNAL MEDICINE | Facility: CLINIC | Age: 53
End: 2023-02-22
Payer: COMMERCIAL

## 2023-02-22 NOTE — TELEPHONE ENCOUNTER
Caller: Jr Mcmahon    Relationship: Self    Best call back number: [phone]    What is the best time to reach you: ANY TIME    Who are you requesting to speak with (clinical staff, provider,  specific staff member): CLINICAL STAFF    Do you know the name of the person who called: JR    What was the call regarding: PATIENT IS REQUESTING A CALL BACK TO DISCUSS WHETHER OR NOT DR BARRON WOULD BE ABLE TO TAKE OVER ADMINISTERING HER ALLERGY SHOTS THAT SHE CURRENTLY RECEIVES AT THE [facility]ERGY CENTER WITH MYRNA ORDOÑEZ.     PLEASE ADVISE.     Do you require a callback: YES

## 2023-05-29 NOTE — PROGRESS NOTES
Subjective     Chief Complaint: Lumbar spondylolisthesis with bilateral L5 radiculopathy    Patient ID: Nita Mcmahon is a 51 y.o. female is here today for follow-up.    History of Present Illness    This is a 51-year-old woman who I saw back in January for some low back pain and bilateral leg pain.  I saw her back in January and talk to her about potentially doing an L4-5 PLIF, however at that time she really had not done much in the way of conservative treatment so I recommended pain management and physical therapy.  She presents today and reports that her symptoms have essentially not gotten any better.  She has a lot of low back pain but also complains of significant, bilateral intermittent L5 radiculopathy.  She would like to talk about surgical options today.    She has had no significant change in her other medical comorbidities or new medical diagnoses since her visit in January.    The following portions of the patient's history were reviewed and updated as appropriate: allergies, current medications, past family history, past medical history, past social history, past surgical history and problem list.    Family history:   Family History   Problem Relation Age of Onset   • Arthritis Other    • Diabetes Other    • Hyperlipidemia Other    • Obesity Other    • Mental illness Other    • Heart attack Other        Social history:   Social History     Socioeconomic History   • Marital status:      Spouse name: Not on file   • Number of children: Not on file   • Years of education: Not on file   • Highest education level: Not on file   Tobacco Use   • Smoking status: Never Smoker   • Smokeless tobacco: Never Used   Substance and Sexual Activity   • Alcohol use: No   • Drug use: No   • Sexual activity: Yes     Partners: Male       Review of Systems   Constitutional: Negative for activity change, appetite change, chills, diaphoresis, fatigue, fever and unexpected weight change.   HENT: Negative for  "congestion, dental problem, drooling, ear discharge, ear pain, facial swelling, hearing loss, mouth sores, nosebleeds, postnasal drip, rhinorrhea, sinus pressure, sneezing, sore throat, tinnitus, trouble swallowing and voice change.    Eyes: Negative for photophobia, pain, discharge, redness, itching and visual disturbance.   Respiratory: Negative for apnea, cough, choking, chest tightness, shortness of breath, wheezing and stridor.    Cardiovascular: Negative for chest pain, palpitations and leg swelling.   Gastrointestinal: Negative for abdominal distention, abdominal pain, anal bleeding, blood in stool, constipation, diarrhea, nausea, rectal pain and vomiting.   Endocrine: Negative for cold intolerance, heat intolerance, polydipsia, polyphagia and polyuria.   Genitourinary: Negative for decreased urine volume, difficulty urinating, dysuria, enuresis, flank pain, frequency, genital sores, hematuria and urgency.   Musculoskeletal: Positive for back pain. Negative for arthralgias, gait problem, joint swelling, myalgias, neck pain and neck stiffness.   Skin: Negative for color change, pallor, rash and wound.   Allergic/Immunologic: Negative for environmental allergies, food allergies and immunocompromised state.   Neurological: Positive for weakness. Negative for dizziness, tremors, seizures, syncope, facial asymmetry, speech difficulty, light-headedness, numbness and headaches.   Hematological: Negative for adenopathy. Does not bruise/bleed easily.   Psychiatric/Behavioral: Negative for agitation, behavioral problems, confusion, decreased concentration, dysphoric mood, hallucinations, self-injury, sleep disturbance and suicidal ideas. The patient is not nervous/anxious and is not hyperactive.    All other systems reviewed and are negative.      Objective   Blood pressure 116/82, temperature 96.8 °F (36 °C), temperature source Infrared, height 157.5 cm (62\"), weight 89.2 kg (196 lb 9.6 oz).  Body mass index is 35.96 " kg/m².    Physical Exam  Vitals and nursing note reviewed.   Constitutional:       Appearance: She is well-developed. She is not toxic-appearing.   HENT:      Head: Normocephalic and atraumatic.      Right Ear: Hearing normal.      Left Ear: Hearing normal.      Nose: Nose normal.   Eyes:      General: Lids are normal.      Conjunctiva/sclera: Conjunctivae normal.      Pupils: Pupils are equal, round, and reactive to light.   Neck:      Vascular: No JVD.   Cardiovascular:      Rate and Rhythm: Normal rate and regular rhythm.      Pulses:           Radial pulses are 2+ on the right side and 2+ on the left side.   Pulmonary:      Effort: Pulmonary effort is normal. No respiratory distress.      Breath sounds: No stridor. No wheezing.   Musculoskeletal:      Cervical back: Normal range of motion.   Skin:     General: Skin is warm and dry.      Findings: No erythema or rash.   Neurological:      Mental Status: She is alert and oriented to person, place, and time.      GCS: GCS eye subscore is 4. GCS verbal subscore is 5. GCS motor subscore is 6.      Cranial Nerves: No cranial nerve deficit.      Motor: No abnormal muscle tone.      Coordination: Coordination is intact. Romberg sign negative. Coordination normal.      Gait: Gait is intact. Gait and tandem walk normal.      Deep Tendon Reflexes: Reflexes are normal and symmetric. Reflexes normal.      Reflex Scores:       Patellar reflexes are 2+ on the right side and 2+ on the left side.       Achilles reflexes are 2+ on the right side and 2+ on the left side.     Comments: Casual, toe raised, heel raised, and tandem gait all performed unremarkably.   Psychiatric:         Behavior: Behavior normal.         Thought Content: Thought content normal.         Judgment: Judgment normal.         Assessment/Plan     Independent Review of Radiographic Studies:      She has no new imaging for me to review.  She has a MRI of the lumbar spine which demonstrates a grade 1  spondylolisthesis of L4 on L5.  She has moderate degenerative disc disease at L3-4 with no obvious radiographic evidence of nerve root compression.  She has a congenitally narrowed spinal canal.  She has decreased lumbar lordosis.  She also has flexion-extension films of the lumbar spine which demonstrate a mobile spondylolisthesis of L4 on L5.    Medical Decision Making:      I discussed the risks and benefits of an L4-5 PLIF once again with the patient.  I think the surgery is likely to help her quite a bit with her leg pain and probably to some degree with her back pain given the dynamic nature of her spondylolisthesis.  A lengthy and protracted conversation was held with her regarding the risks and benefits.  She inquired about doing a simple decompression/laminectomy at L4.  I advised her that the reoperation rate with a surgery like this without spinal stabilization is very high.  I did advise her that she does have some early degenerative disc disease at L3-4 and I counseled her about the potential development of adjacent level disease.  She would like some time to consider her options and I think this is reasonable.  She also is considering getting a second opinion, so I did provide her with the names of some reputable surgeons here in town so she can get a second opinion.  She will call me if she would like to schedule her L4-5 PLIF.    Diagnoses and all orders for this visit:    1. Lumbosacral radiculopathy (Primary)    2. Spondylolisthesis, lumbar region        No follow-ups on file.           This document signed by SYLVESTER Chavez MD August 4, 2021 12:11 EDT       yes

## 2023-06-05 ENCOUNTER — LAB (OUTPATIENT)
Dept: LAB | Facility: HOSPITAL | Age: 53
End: 2023-06-05
Payer: COMMERCIAL

## 2023-06-05 ENCOUNTER — OFFICE VISIT (OUTPATIENT)
Dept: INTERNAL MEDICINE | Facility: CLINIC | Age: 53
End: 2023-06-05
Payer: COMMERCIAL

## 2023-06-05 VITALS
WEIGHT: 193 LBS | HEIGHT: 62 IN | HEART RATE: 77 BPM | DIASTOLIC BLOOD PRESSURE: 90 MMHG | SYSTOLIC BLOOD PRESSURE: 138 MMHG | BODY MASS INDEX: 35.51 KG/M2 | OXYGEN SATURATION: 97 %

## 2023-06-05 DIAGNOSIS — F32.A ANXIETY AND DEPRESSION: ICD-10-CM

## 2023-06-05 DIAGNOSIS — F41.9 ANXIETY AND DEPRESSION: ICD-10-CM

## 2023-06-05 DIAGNOSIS — R25.2 LEG CRAMPS: ICD-10-CM

## 2023-06-05 DIAGNOSIS — Z13.220 LIPID SCREENING: ICD-10-CM

## 2023-06-05 DIAGNOSIS — M19.90 ARTHRITIS: Primary | ICD-10-CM

## 2023-06-05 LAB
ANION GAP SERPL CALCULATED.3IONS-SCNC: 7.6 MMOL/L (ref 5–15)
BUN SERPL-MCNC: 17 MG/DL (ref 6–20)
BUN/CREAT SERPL: 22.1 (ref 7–25)
CALCIUM SPEC-SCNC: 9.5 MG/DL (ref 8.6–10.5)
CHLORIDE SERPL-SCNC: 107 MMOL/L (ref 98–107)
CHOLEST SERPL-MCNC: 229 MG/DL (ref 0–200)
CHROMATIN AB SERPL-ACNC: <10 IU/ML (ref 0–14)
CO2 SERPL-SCNC: 27.4 MMOL/L (ref 22–29)
CREAT SERPL-MCNC: 0.77 MG/DL (ref 0.57–1)
CRP SERPL-MCNC: <0.3 MG/DL (ref 0–0.5)
EGFRCR SERPLBLD CKD-EPI 2021: 91.2 ML/MIN/1.73
ERYTHROCYTE [SEDIMENTATION RATE] IN BLOOD: 6 MM/HR (ref 0–30)
GLUCOSE SERPL-MCNC: 93 MG/DL (ref 65–99)
HDLC SERPL-MCNC: 50 MG/DL (ref 40–60)
LDLC SERPL CALC-MCNC: 139 MG/DL (ref 0–100)
LDLC/HDLC SERPL: 2.69 {RATIO}
MAGNESIUM SERPL-MCNC: 2.1 MG/DL (ref 1.6–2.6)
POTASSIUM SERPL-SCNC: 4.3 MMOL/L (ref 3.5–5.2)
SODIUM SERPL-SCNC: 142 MMOL/L (ref 136–145)
TRIGL SERPL-MCNC: 223 MG/DL (ref 0–150)
URATE SERPL-MCNC: 5.9 MG/DL (ref 2.4–5.7)
VLDLC SERPL-MCNC: 40 MG/DL (ref 5–40)

## 2023-06-05 PROCEDURE — 86038 ANTINUCLEAR ANTIBODIES: CPT | Performed by: INTERNAL MEDICINE

## 2023-06-05 PROCEDURE — 86200 CCP ANTIBODY: CPT | Performed by: INTERNAL MEDICINE

## 2023-06-05 PROCEDURE — 84550 ASSAY OF BLOOD/URIC ACID: CPT | Performed by: INTERNAL MEDICINE

## 2023-06-05 PROCEDURE — 83735 ASSAY OF MAGNESIUM: CPT | Performed by: INTERNAL MEDICINE

## 2023-06-05 PROCEDURE — 86431 RHEUMATOID FACTOR QUANT: CPT | Performed by: INTERNAL MEDICINE

## 2023-06-05 PROCEDURE — 85652 RBC SED RATE AUTOMATED: CPT | Performed by: INTERNAL MEDICINE

## 2023-06-05 PROCEDURE — 80061 LIPID PANEL: CPT | Performed by: INTERNAL MEDICINE

## 2023-06-05 PROCEDURE — 36415 COLL VENOUS BLD VENIPUNCTURE: CPT | Performed by: INTERNAL MEDICINE

## 2023-06-05 PROCEDURE — 86140 C-REACTIVE PROTEIN: CPT | Performed by: INTERNAL MEDICINE

## 2023-06-05 PROCEDURE — 80048 BASIC METABOLIC PNL TOTAL CA: CPT | Performed by: INTERNAL MEDICINE

## 2023-06-05 RX ORDER — CYCLOBENZAPRINE HCL 10 MG
10 TABLET ORAL 3 TIMES DAILY PRN
Qty: 90 TABLET | Refills: 2 | Status: SHIPPED | OUTPATIENT
Start: 2023-06-05

## 2023-06-05 RX ORDER — CELECOXIB 200 MG/1
200 CAPSULE ORAL DAILY
Qty: 30 CAPSULE | Refills: 5 | Status: SHIPPED | OUTPATIENT
Start: 2023-06-05 | End: 2023-07-05

## 2023-06-05 NOTE — PROGRESS NOTES
"Subjective   Nita Mcmahon is a 53 y.o. female here for follow-up anxiety and depression, new leg cramps and follow-up arthritis.  Patient says the Mobic is not really working anymore, particularly for the knees and hands.  She feels stiff and achy all over.  She does not recall having an autoimmune work-up in the past.  She does recall taking Celebrex but does not remember its effect.  Mood has been good, on Effexor.  Has also noted new leg cramps, particularly feet and calves.  No family history of autoimmune disorder, no personal history of psoriasis.    I have reviewed the patient's relevant medical history and confirmed it is accurate.    I have personally reviewed and performed the ROS. Mary Dixon MD     Objective   /90 (BP Location: Left arm)   Pulse 77   Ht 157.5 cm (62\")   Wt 87.5 kg (193 lb)   SpO2 97%   BMI 35.30 kg/m²     Physical Exam  Vitals reviewed.   Constitutional:       Appearance: She is well-developed.   Pulmonary:      Effort: Pulmonary effort is normal.   Neurological:      General: No focal deficit present.      Mental Status: She is alert.   Psychiatric:         Behavior: Behavior normal.         Thought Content: Thought content normal.         Judgment: Judgment normal.       Current Outpatient Medications:     albuterol sulfate HFA (ProAir HFA) 108 (90 Base) MCG/ACT inhaler, Inhale 1 puff 4 (Four) Times a Day. (Patient taking differently: Inhale 1 puff At Night As Needed.), Disp: 18 g, Rfl: 11    estradiol (ESTRACE) 1 MG tablet, Take 1 tablet by mouth Daily., Disp: 90 tablet, Rfl: 2    fluticasone (FLONASE) 50 MCG/ACT nasal spray, 2 sprays into the nostril(s) as directed by provider At Night As Needed for Rhinitis or Allergies., Disp: , Rfl:     levocetirizine (XYZAL) 5 MG tablet, Take 1 tablet by mouth Every Evening., Disp: , Rfl:     meloxicam (MOBIC) 15 MG tablet, TAKE 1 TABLET BY MOUTH DAILY, Disp: 90 tablet, Rfl: 3    multivitamin with minerals tablet " tablet, Take 1 tablet by mouth Daily., Disp: , Rfl:     venlafaxine XR (Effexor XR) 75 MG 24 hr capsule, Take 1 capsule by mouth Daily., Disp: 90 capsule, Rfl: 3    Assessment & Plan   Diagnoses and all orders for this visit:    1. Arthritis (Primary)  -     celecoxib (CeleBREX) 200 MG capsule; Take 1 capsule by mouth Daily for 30 days.  Dispense: 30 capsule; Refill: 5  -     LIANG With / DsDNA, RNP, Sjogrens A / B, Smith  -     C-reactive Protein  -     Cyclic Citrul Peptide Antibody, IgG / IgA  -     Rheumatoid Factor  -Have never done autoimmune work-up, will do that today    2. Anxiety and depression  -Chronic stable, continue Effexor    3. Leg cramps  -     Sedimentation Rate  -     Uric acid  -     Magnesium  -     Basic Metabolic Panel  -     cyclobenzaprine (FLEXERIL) 10 MG tablet; Take 1 tablet by mouth 3 (Three) Times a Day As Needed for Muscle Spasms.  Dispense: 90 tablet; Refill: 2    4. Lipid screening  -     Lipid Panel             Mary Dixon MD

## 2023-06-06 LAB
ANA SER QL: NEGATIVE
CCP IGA+IGG SERPL IA-ACNC: 4 UNITS (ref 0–19)

## 2023-08-07 RX ORDER — MELOXICAM 15 MG/1
TABLET ORAL
Qty: 90 TABLET | Refills: 0 | Status: SHIPPED | OUTPATIENT
Start: 2023-08-07

## 2023-08-22 DIAGNOSIS — F41.9 ANXIETY AND DEPRESSION: ICD-10-CM

## 2023-08-22 DIAGNOSIS — F32.A ANXIETY AND DEPRESSION: ICD-10-CM

## 2023-08-22 RX ORDER — VENLAFAXINE HYDROCHLORIDE 75 MG/1
75 CAPSULE, EXTENDED RELEASE ORAL DAILY
Qty: 90 CAPSULE | Refills: 0 | Status: SHIPPED | OUTPATIENT
Start: 2023-08-22

## 2023-11-09 NOTE — PROGRESS NOTES
"Subjective   Nita Mcmahon is a 48 y.o. female here for follow-up epigastric pain \"spells.\" She has had these every few months for the last year. She has gallbladder. She has severe epigastric pain and burning, acid reflux, writhing in pain. Most recent episode lasted about 1.5 hours then she felt tender in the area afterwards. No RUQ pain. No constitutional sx. She has not had a HIDA though had US gallbladder about a year ago that was negative. She used to take zantac but doesn't anymore. Bentyl helps her IBS in terms of gas and bloating but not this. Mood is ok, stable.       The following portions of the patient's history were reviewed and updated as appropriate: allergies, current medications, past medical history, past social history, past surgical history and problem list.    Review of Systems:  General: negative  HEENT: negative  Respiratory: negative  GI: see hpi  Psych: negative    Objective   /70 (BP Location: Right arm, Patient Position: Sitting, Cuff Size: Adult)   Temp 97.2 °F (36.2 °C) (Temporal Artery )   Ht 157.5 cm (62\")   Wt 88.7 kg (195 lb 9.6 oz)   BMI 35.78 kg/m²     Physical Exam   Constitutional: She is oriented to person, place, and time. She appears well-developed and well-nourished.   Pulmonary/Chest: Effort normal.   Abdominal: Soft. She exhibits no distension.   Neurological: She is alert and oriented to person, place, and time.   Skin: Skin is warm and dry.   Psychiatric: She has a normal mood and affect. Her behavior is normal. Judgment and thought content normal.   Vitals reviewed.      Assessment/Plan   Nita was seen today for follow-up.    Diagnoses and all orders for this visit:    Anxiety and depression  -     citalopram (CELEXA) 40 MG tablet; Take 1 tablet by mouth Daily.    Irritable bowel syndrome, unspecified type  -     dicyclomine (BENTYL) 10 MG capsule; Take 1 capsule by mouth 3 (Three) Times a Day As Needed (abdominal pain or bloating/gas).  -     NM HIDA scan " Surgeon (Optional): Bianca Hall PA-C with pharmacological intervention    Epigastric pain  -     raNITIdine (ZANTAC) 150 MG tablet; Take 1 tablet by mouth Daily.  -     NM HIDA scan with pharmacological intervention  -     If HIDA negative, refer to GI for further eval    Gastroesophageal reflux disease without esophagitis  -restart H2 blocker    Other orders  -     montelukast (SINGULAIR) 10 MG tablet; Take 1 tablet by mouth Every Night.  -     estradiol (ESTRACE) 1 MG tablet; Take 1 tablet by mouth Daily.  -     meloxicam (MOBIC) 15 MG tablet; Take 1 tablet by mouth Daily As Needed for Moderate Pain .  -     fexofenadine-pseudoephedrine (ALLEGRA-D ALLERGY & CONGESTION) 180-240 MG per 24 hr tablet; Take 1 tablet by mouth Daily.            Biopsy Photograph Reviewed: Yes Size Of Lesion In Cm: 0.6 X Size Of Lesion In Cm (Optional): 0 Size Of Margin In Cm: 0.2 Anesthesia Volume In Cc: 3 Was An Eye Clamp Used?: No Eye Clamp Note Details: An eye clamp was used during the procedure. Excision Method: Elliptical Saucerization Depth: dermis and superficial adipose tissue Repair Type: Intermediate Intermediate / Complex Repair - Final Wound Length In Cm: 2 Suturegard Retention Suture: 2-0 Nylon Retention Suture Bite Size: 3 mm Length To Time In Minutes Device Was In Place: 10 Number Of Hemigard Strips Per Side: 1 Distance Of Undermining In Cm (Required): 0.8 Undermining Type: Entire Wound Debridement Text: The wound edges were debrided prior to proceeding with the closure to facilitate wound healing. Helical Rim Text: The closure involved the helical rim. Vermilion Border Text: The closure involved the vermilion border. Nostril Rim Text: The closure involved the nostril rim. Retention Suture Text: Retention sutures were placed to support the closure and prevent dehiscence. Lab: [id number] Lab Facility: [id number] Graft Donor Site Bandage (Optional-Leave Blank If You Don't Want In Note): Steri-strips and a pressure bandage were applied to the donor site. Epidermal Closure Graft Donor Site (Optional): simple interrupted Billing Type: Third-Party Bill Excision Depth: adipose tissue Scalpel Size: 15 blade Anesthesia Type: 1% lidocaine with epinephrine Hemostasis: Electrocautery Estimated Blood Loss (Cc): minimal Detail Level: Detailed Deep Sutures: 3-0 Vicryl Epidermal Sutures: 4-0 Plain Gut Epidermal Closure: running Wound Care: Petrolatum Dressing: dry sterile dressing Suturegard Intro: Intraoperative tissue expansion was performed, utilizing the SUTUREGARD device, in order to reduce wound tension. Suturegard Body: The suture ends were repeatedly re-tightened and re-clamped to achieve the desired tissue expansion. Hemigard Intro: Due to skin fragility and wound tension, it was decided to use HEMIGARD adhesive retention suture devices to permit a linear closure. The skin was cleaned and dried for a 6cm distance away from the wound. Excessive hair, if present, was removed to allow for adhesion. Hemigard Postcare Instructions: The HEMIGARD strips are to remain completely dry for at least 5-7 days. Positioning (Leave Blank If You Do Not Want): The patient was placed in a comfortable position exposing the surgical site. Pre-Excision Curettage Text (Leave Blank If You Do Not Want): Prior to drawing the surgical margin the visible lesion was removed with electrodesiccation and curettage to clearly define the lesion size. Complex Repair Preamble Text (Leave Blank If You Do Not Want): Extensive wide undermining was performed. Intermediate Repair Preamble Text (Leave Blank If You Do Not Want): Undermining was performed with blunt dissection. Curvilinear Excision Additional Text (Leave Blank If You Do Not Want): The margin was drawn around the clinically apparent lesion.  A curvilinear shape was then drawn on the skin incorporating the lesion and margins.  Incisions were then made along these lines to the appropriate tissue plane and the lesion was extirpated. Fusiform Excision Additional Text (Leave Blank If You Do Not Want): The margin was drawn around the clinically apparent lesion.  A fusiform shape was then drawn on the skin incorporating the lesion and margins.  Incisions were then made along these lines to the appropriate tissue plane and the lesion was extirpated. Elliptical Excision Additional Text (Leave Blank If You Do Not Want): The margin was drawn around the clinically apparent lesion.  An elliptical shape was then drawn on the skin incorporating the lesion and margins.  Incisions were then made along these lines to the appropriate tissue plane and the lesion was extirpated. Saucerization Excision Additional Text (Leave Blank If You Do Not Want): The margin was drawn around the clinically apparent lesion.  Incisions were then made along these lines, in a tangential fashion, to the appropriate tissue plane and the lesion was extirpated. Slit Excision Additional Text (Leave Blank If You Do Not Want): A linear line was drawn on the skin overlying the lesion. An incision was made slowly until the lesion was visualized.  Once visualized, the lesion was removed with blunt dissection. Excisional Biopsy Additional Text (Leave Blank If You Do Not Want): The margin was drawn around the clinically apparent lesion. An elliptical shape was then drawn on the skin incorporating the lesion and margins.  Incisions were then made along these lines to the appropriate tissue plane and the lesion was extirpated. Perilesional Excision Additional Text (Leave Blank If You Do Not Want): The margin was drawn around the clinically apparent lesion. Incisions were then made along these lines to the appropriate tissue plane and the lesion was extirpated. Repair Performed By Another Provider Text (Leave Blank If You Do Not Want): After the tissue was excised the defect was repaired by another provider. No Repair - Repaired With Adjacent Surgical Defect Text (Leave Blank If You Do Not Want): After the excision the defect was repaired concurrently with another surgical defect which was in close approximation. Adjacent Tissue Transfer Text: The defect edges were debeveled with a #15 scalpel blade.  Given the location of the defect and the proximity to free margins an adjacent tissue transfer was deemed most appropriate.  Using a sterile surgical marker, an appropriate flap was drawn incorporating the defect and placing the expected incisions within the relaxed skin tension lines where possible.    The area thus outlined was incised deep to adipose tissue with a #15 scalpel blade.  The skin margins were undermined to an appropriate distance in all directions utilizing iris scissors. Advancement Flap (Single) Text: The defect edges were debeveled with a #15 scalpel blade.  Given the location of the defect and the proximity to free margins a single advancement flap was deemed most appropriate.  Using a sterile surgical marker, an appropriate advancement flap was drawn incorporating the defect and placing the expected incisions within the relaxed skin tension lines where possible.    The area thus outlined was incised deep to adipose tissue with a #15 scalpel blade.  The skin margins were undermined to an appropriate distance in all directions utilizing iris scissors. Advancement Flap (Double) Text: The defect edges were debeveled with a #15 scalpel blade.  Given the location of the defect and the proximity to free margins a double advancement flap was deemed most appropriate.  Using a sterile surgical marker, the appropriate advancement flaps were drawn incorporating the defect and placing the expected incisions within the relaxed skin tension lines where possible.    The area thus outlined was incised deep to adipose tissue with a #15 scalpel blade.  The skin margins were undermined to an appropriate distance in all directions utilizing iris scissors. Burow's Advancement Flap Text: The defect edges were debeveled with a #15 scalpel blade.  Given the location of the defect and the proximity to free margins a Burow's advancement flap was deemed most appropriate.  Using a sterile surgical marker, the appropriate advancement flap was drawn incorporating the defect and placing the expected incisions within the relaxed skin tension lines where possible.    The area thus outlined was incised deep to adipose tissue with a #15 scalpel blade.  The skin margins were undermined to an appropriate distance in all directions utilizing iris scissors. Chonodrocutaneous Helical Advancement Flap Text: The defect edges were debeveled with a #15 scalpel blade.  Given the location of the defect and the proximity to free margins a chondrocutaneous helical advancement flap was deemed most appropriate.  Using a sterile surgical marker, the appropriate advancement flap was drawn incorporating the defect and placing the expected incisions within the relaxed skin tension lines where possible.    The area thus outlined was incised deep to adipose tissue with a #15 scalpel blade.  The skin margins were undermined to an appropriate distance in all directions utilizing iris scissors. Crescentic Advancement Flap Text: The defect edges were debeveled with a #15 scalpel blade.  Given the location of the defect and the proximity to free margins a crescentic advancement flap was deemed most appropriate.  Using a sterile surgical marker, the appropriate advancement flap was drawn incorporating the defect and placing the expected incisions within the relaxed skin tension lines where possible.    The area thus outlined was incised deep to adipose tissue with a #15 scalpel blade.  The skin margins were undermined to an appropriate distance in all directions utilizing iris scissors. A-T Advancement Flap Text: The defect edges were debeveled with a #15 scalpel blade.  Given the location of the defect, shape of the defect and the proximity to free margins an A-T advancement flap was deemed most appropriate.  Using a sterile surgical marker, an appropriate advancement flap was drawn incorporating the defect and placing the expected incisions within the relaxed skin tension lines where possible.    The area thus outlined was incised deep to adipose tissue with a #15 scalpel blade.  The skin margins were undermined to an appropriate distance in all directions utilizing iris scissors. O-T Advancement Flap Text: The defect edges were debeveled with a #15 scalpel blade.  Given the location of the defect, shape of the defect and the proximity to free margins an O-T advancement flap was deemed most appropriate.  Using a sterile surgical marker, an appropriate advancement flap was drawn incorporating the defect and placing the expected incisions within the relaxed skin tension lines where possible.    The area thus outlined was incised deep to adipose tissue with a #15 scalpel blade.  The skin margins were undermined to an appropriate distance in all directions utilizing iris scissors. O-L Flap Text: The defect edges were debeveled with a #15 scalpel blade.  Given the location of the defect, shape of the defect and the proximity to free margins an O-L flap was deemed most appropriate.  Using a sterile surgical marker, an appropriate advancement flap was drawn incorporating the defect and placing the expected incisions within the relaxed skin tension lines where possible.    The area thus outlined was incised deep to adipose tissue with a #15 scalpel blade.  The skin margins were undermined to an appropriate distance in all directions utilizing iris scissors. O-Z Flap Text: The defect edges were debeveled with a #15 scalpel blade.  Given the location of the defect, shape of the defect and the proximity to free margins an O-Z flap was deemed most appropriate.  Using a sterile surgical marker, an appropriate transposition flap was drawn incorporating the defect and placing the expected incisions within the relaxed skin tension lines where possible. The area thus outlined was incised deep to adipose tissue with a #15 scalpel blade.  The skin margins were undermined to an appropriate distance in all directions utilizing iris scissors. Double O-Z Flap Text: The defect edges were debeveled with a #15 scalpel blade.  Given the location of the defect, shape of the defect and the proximity to free margins a Double O-Z flap was deemed most appropriate.  Using a sterile surgical marker, an appropriate transposition flap was drawn incorporating the defect and placing the expected incisions within the relaxed skin tension lines where possible. The area thus outlined was incised deep to adipose tissue with a #15 scalpel blade.  The skin margins were undermined to an appropriate distance in all directions utilizing iris scissors. V-Y Flap Text: The defect edges were debeveled with a #15 scalpel blade.  Given the location of the defect, shape of the defect and the proximity to free margins a V-Y flap was deemed most appropriate.  Using a sterile surgical marker, an appropriate advancement flap was drawn incorporating the defect and placing the expected incisions within the relaxed skin tension lines where possible.    The area thus outlined was incised deep to adipose tissue with a #15 scalpel blade.  The skin margins were undermined to an appropriate distance in all directions utilizing iris scissors. Advancement-Rotation Flap Text: The defect edges were debeveled with a #15 scalpel blade.  Given the location of the defect, shape of the defect and the proximity to free margins an advancement-rotation flap was deemed most appropriate.  Using a sterile surgical marker, an appropriate flap was drawn incorporating the defect and placing the expected incisions within the relaxed skin tension lines where possible. The area thus outlined was incised deep to adipose tissue with a #15 scalpel blade.  The skin margins were undermined to an appropriate distance in all directions utilizing iris scissors. Mercedes Flap Text: The defect edges were debeveled with a #15 scalpel blade.  Given the location of the defect, shape of the defect and the proximity to free margins a Mercedes flap was deemed most appropriate.  Using a sterile surgical marker, an appropriate advancement flap was drawn incorporating the defect and placing the expected incisions within the relaxed skin tension lines where possible. The area thus outlined was incised deep to adipose tissue with a #15 scalpel blade.  The skin margins were undermined to an appropriate distance in all directions utilizing iris scissors. Modified Advancement Flap Text: The defect edges were debeveled with a #15 scalpel blade.  Given the location of the defect, shape of the defect and the proximity to free margins a modified advancement flap was deemed most appropriate.  Using a sterile surgical marker, an appropriate advancement flap was drawn incorporating the defect and placing the expected incisions within the relaxed skin tension lines where possible.    The area thus outlined was incised deep to adipose tissue with a #15 scalpel blade.  The skin margins were undermined to an appropriate distance in all directions utilizing iris scissors. Mucosal Advancement Flap Text: Given the location of the defect, shape of the defect and the proximity to free margins a mucosal advancement flap was deemed most appropriate. Incisions were made with a 15 blade scalpel in the appropriate fashion along the cutaneous vermilion border and the mucosal lip. The remaining actinically damaged mucosal tissue was excised.  The mucosal advancement flap was then elevated to the gingival sulcus with care taken to preserve the neurovascular structures and advanced into the primary defect. Care was taken to ensure that precise realignment of the vermilion border was achieved. Peng Advancement Flap Text: The defect edges were debeveled with a #15 scalpel blade.  Given the location of the defect, shape of the defect and the proximity to free margins a Peng advancement flap was deemed most appropriate.  Using a sterile surgical marker, an appropriate advancement flap was drawn incorporating the defect and placing the expected incisions within the relaxed skin tension lines where possible. The area thus outlined was incised deep to adipose tissue with a #15 scalpel blade.  The skin margins were undermined to an appropriate distance in all directions utilizing iris scissors. Hatchet Flap Text: The defect edges were debeveled with a #15 scalpel blade.  Given the location of the defect, shape of the defect and the proximity to free margins a hatchet flap was deemed most appropriate.  Using a sterile surgical marker, an appropriate hatchet flap was drawn incorporating the defect and placing the expected incisions within the relaxed skin tension lines where possible.    The area thus outlined was incised deep to adipose tissue with a #15 scalpel blade.  The skin margins were undermined to an appropriate distance in all directions utilizing iris scissors. Rotation Flap Text: The defect edges were debeveled with a #15 scalpel blade.  Given the location of the defect, shape of the defect and the proximity to free margins a rotation flap was deemed most appropriate.  Using a sterile surgical marker, an appropriate rotation flap was drawn incorporating the defect and placing the expected incisions within the relaxed skin tension lines where possible.    The area thus outlined was incised deep to adipose tissue with a #15 scalpel blade.  The skin margins were undermined to an appropriate distance in all directions utilizing iris scissors. Bilateral Rotation Flap Text: The defect edges were debeveled with a #15 scalpel blade. Given the location of the defect, shape of the defect and the proximity to free margins a bilateral rotation flap was deemed most appropriate. Using a sterile surgical marker, an appropriate rotation flap was drawn incorporating the defect and placing the expected incisions within the relaxed skin tension lines where possible. The area thus outlined was incised deep to adipose tissue with a #15 scalpel blade. The skin margins were undermined to an appropriate distance in all directions utilizing iris scissors. Following this, the designed flap was carried over into the primary defect and sutured into place. Spiral Flap Text: The defect edges were debeveled with a #15 scalpel blade.  Given the location of the defect, shape of the defect and the proximity to free margins a spiral flap was deemed most appropriate.  Using a sterile surgical marker, an appropriate rotation flap was drawn incorporating the defect and placing the expected incisions within the relaxed skin tension lines where possible. The area thus outlined was incised deep to adipose tissue with a #15 scalpel blade.  The skin margins were undermined to an appropriate distance in all directions utilizing iris scissors. Staged Advancement Flap Text: The defect edges were debeveled with a #15 scalpel blade.  Given the location of the defect, shape of the defect and the proximity to free margins a staged advancement flap was deemed most appropriate.  Using a sterile surgical marker, an appropriate advancement flap was drawn incorporating the defect and placing the expected incisions within the relaxed skin tension lines where possible. The area thus outlined was incised deep to adipose tissue with a #15 scalpel blade.  The skin margins were undermined to an appropriate distance in all directions utilizing iris scissors. Star Wedge Flap Text: The defect edges were debeveled with a #15 scalpel blade.  Given the location of the defect, shape of the defect and the proximity to free margins a star wedge flap was deemed most appropriate.  Using a sterile surgical marker, an appropriate rotation flap was drawn incorporating the defect and placing the expected incisions within the relaxed skin tension lines where possible. The area thus outlined was incised deep to adipose tissue with a #15 scalpel blade.  The skin margins were undermined to an appropriate distance in all directions utilizing iris scissors. Transposition Flap Text: The defect edges were debeveled with a #15 scalpel blade.  Given the location of the defect and the proximity to free margins a transposition flap was deemed most appropriate.  Using a sterile surgical marker, an appropriate transposition flap was drawn incorporating the defect.    The area thus outlined was incised deep to adipose tissue with a #15 scalpel blade.  The skin margins were undermined to an appropriate distance in all directions utilizing iris scissors. Muscle Hinge Flap Text: The defect edges were debeveled with a #15 scalpel blade.  Given the size, depth and location of the defect and the proximity to free margins a muscle hinge flap was deemed most appropriate.  Using a sterile surgical marker, an appropriate hinge flap was drawn incorporating the defect. The area thus outlined was incised with a #15 scalpel blade.  The skin margins were undermined to an appropriate distance in all directions utilizing iris scissors. Mustarde Flap Text: The defect edges were debeveled with a #15 scalpel blade.  Given the size, depth and location of the defect and the proximity to free margins a Mustarde flap was deemed most appropriate.  Using a sterile surgical marker, an appropriate flap was drawn incorporating the defect. The area thus outlined was incised with a #15 scalpel blade.  The skin margins were undermined to an appropriate distance in all directions utilizing iris scissors. Nasal Turnover Hinge Flap Text: The defect edges were debeveled with a #15 scalpel blade.  Given the size, depth, location of the defect and the defect being full thickness a nasal turnover hinge flap was deemed most appropriate.  Using a sterile surgical marker, an appropriate hinge flap was drawn incorporating the defect. The area thus outlined was incised with a #15 scalpel blade. The flap was designed to recreate the nasal mucosal lining and the alar rim. The skin margins were undermined to an appropriate distance in all directions utilizing iris scissors. Nasalis-Muscle-Based Myocutaneous Island Pedicle Flap Text: Using a #15 blade, an incision was made around the donor flap to the level of the nasalis muscle. Wide lateral undermining was then performed in both the subcutaneous plane above the nasalis muscle, and in a submuscular plane just above periosteum. This allowed the formation of a free nasalis muscle axial pedicle (based on the angular artery) which was still attached to the actual cutaneous flap, increasing its mobility and vascular viability. Hemostasis was obtained with pinpoint electrocoagulation. The flap was mobilized into position and the pivotal anchor points positioned and stabilized with buried interrupted sutures. Subcutaneous and dermal tissues were closed in a multilayered fashion with sutures. Tissue redundancies were excised, and the epidermal edges were apposed without significant tension and sutured with sutures. Orbicularis Oris Muscle Flap Text: The defect edges were debeveled with a #15 scalpel blade.  Given that the defect affected the competency of the oral sphincter an orbicularis oris muscle flap was deemed most appropriate to restore this competency and normal muscle function.  Using a sterile surgical marker, an appropriate flap was drawn incorporating the defect. The area thus outlined was incised with a #15 scalpel blade. Melolabial Transposition Flap Text: The defect edges were debeveled with a #15 scalpel blade.  Given the location of the defect and the proximity to free margins a melolabial flap was deemed most appropriate.  Using a sterile surgical marker, an appropriate melolabial transposition flap was drawn incorporating the defect.    The area thus outlined was incised deep to adipose tissue with a #15 scalpel blade.  The skin margins were undermined to an appropriate distance in all directions utilizing iris scissors. Rhombic Flap Text: The defect edges were debeveled with a #15 scalpel blade.  Given the location of the defect and the proximity to free margins a rhombic flap was deemed most appropriate.  Using a sterile surgical marker, an appropriate rhombic flap was drawn incorporating the defect.    The area thus outlined was incised deep to adipose tissue with a #15 scalpel blade.  The skin margins were undermined to an appropriate distance in all directions utilizing iris scissors. Rhomboid Transposition Flap Text: The defect edges were debeveled with a #15 scalpel blade.  Given the location of the defect and the proximity to free margins a rhomboid transposition flap was deemed most appropriate.  Using a sterile surgical marker, an appropriate rhomboid flap was drawn incorporating the defect.    The area thus outlined was incised deep to adipose tissue with a #15 scalpel blade.  The skin margins were undermined to an appropriate distance in all directions utilizing iris scissors. Bi-Rhombic Flap Text: The defect edges were debeveled with a #15 scalpel blade.  Given the location of the defect and the proximity to free margins a bi-rhombic flap was deemed most appropriate.  Using a sterile surgical marker, an appropriate rhombic flap was drawn incorporating the defect. The area thus outlined was incised deep to adipose tissue with a #15 scalpel blade.  The skin margins were undermined to an appropriate distance in all directions utilizing iris scissors. Helical Rim Advancement Flap Text: The defect edges were debeveled with a #15 blade scalpel.  Given the location of the defect and the proximity to free margins (helical rim) a double helical rim advancement flap was deemed most appropriate.  Using a sterile surgical marker, the appropriate advancement flaps were drawn incorporating the defect and placing the expected incisions between the helical rim and antihelix where possible.  The area thus outlined was incised through and through with a #15 scalpel blade.  With a skin hook and iris scissors, the flaps were gently and sharply undermined and freed up. Bilateral Helical Rim Advancement Flap Text: The defect edges were debeveled with a #15 blade scalpel.  Given the location of the defect and the proximity to free margins (helical rim) a bilateral helical rim advancement flap was deemed most appropriate.  Using a sterile surgical marker, the appropriate advancement flaps were drawn incorporating the defect and placing the expected incisions between the helical rim and antihelix where possible.  The area thus outlined was incised through and through with a #15 scalpel blade.  With a skin hook and iris scissors, the flaps were gently and sharply undermined and freed up. Ear Star Wedge Flap Text: The defect edges were debeveled with a #15 blade scalpel.  Given the location of the defect and the proximity to free margins (helical rim) an ear star wedge flap was deemed most appropriate.  Using a sterile surgical marker, the appropriate flap was drawn incorporating the defect and placing the expected incisions between the helical rim and antihelix where possible.  The area thus outlined was incised through and through with a #15 scalpel blade. Banner Transposition Flap Text: The defect edges were debeveled with a #15 scalpel blade.  Given the location of the defect and the proximity to free margins a Banner transposition flap was deemed most appropriate.  Using a sterile surgical marker, an appropriate flap drawn around the defect. The area thus outlined was incised deep to adipose tissue with a #15 scalpel blade.  The skin margins were undermined to an appropriate distance in all directions utilizing iris scissors. Bilobed Flap Text: The defect edges were debeveled with a #15 scalpel blade.  Given the location of the defect and the proximity to free margins a bilobe flap was deemed most appropriate.  Using a sterile surgical marker, an appropriate bilobe flap drawn around the defect.    The area thus outlined was incised deep to adipose tissue with a #15 scalpel blade.  The skin margins were undermined to an appropriate distance in all directions utilizing iris scissors. Bilobed Transposition Flap Text: The defect edges were debeveled with a #15 scalpel blade.  Given the location of the defect and the proximity to free margins a bilobed transposition flap was deemed most appropriate.  Using a sterile surgical marker, an appropriate bilobe flap drawn around the defect.    The area thus outlined was incised deep to adipose tissue with a #15 scalpel blade.  The skin margins were undermined to an appropriate distance in all directions utilizing iris scissors. Trilobed Flap Text: The defect edges were debeveled with a #15 scalpel blade.  Given the location of the defect and the proximity to free margins a trilobed flap was deemed most appropriate.  Using a sterile surgical marker, an appropriate trilobed flap drawn around the defect.    The area thus outlined was incised deep to adipose tissue with a #15 scalpel blade.  The skin margins were undermined to an appropriate distance in all directions utilizing iris scissors. Dorsal Nasal Flap Text: The defect edges were debeveled with a #15 scalpel blade.  Given the location of the defect and the proximity to free margins a dorsal nasal flap was deemed most appropriate.  Using a sterile surgical marker, an appropriate dorsal nasal flap was drawn around the defect.    The area thus outlined was incised deep to adipose tissue with a #15 scalpel blade.  The skin margins were undermined to an appropriate distance in all directions utilizing iris scissors. Island Pedicle Flap Text: The defect edges were debeveled with a #15 scalpel blade.  Given the location of the defect, shape of the defect and the proximity to free margins an island pedicle advancement flap was deemed most appropriate.  Using a sterile surgical marker, an appropriate advancement flap was drawn incorporating the defect, outlining the appropriate donor tissue and placing the expected incisions within the relaxed skin tension lines where possible.    The area thus outlined was incised deep to adipose tissue with a #15 scalpel blade.  The skin margins were undermined to an appropriate distance in all directions around the primary defect and laterally outward around the island pedicle utilizing iris scissors.  There was minimal undermining beneath the pedicle flap. Island Pedicle Flap With Canthal Suspension Text: The defect edges were debeveled with a #15 scalpel blade.  Given the location of the defect, shape of the defect and the proximity to free margins an island pedicle advancement flap was deemed most appropriate.  Using a sterile surgical marker, an appropriate advancement flap was drawn incorporating the defect, outlining the appropriate donor tissue and placing the expected incisions within the relaxed skin tension lines where possible. The area thus outlined was incised deep to adipose tissue with a #15 scalpel blade.  The skin margins were undermined to an appropriate distance in all directions around the primary defect and laterally outward around the island pedicle utilizing iris scissors.  There was minimal undermining beneath the pedicle flap. A suspension suture was placed in the canthal tendon to prevent tension and prevent ectropion. Alar Island Pedicle Flap Text: The defect edges were debeveled with a #15 scalpel blade.  Given the location of the defect, shape of the defect and the proximity to the alar rim an island pedicle advancement flap was deemed most appropriate.  Using a sterile surgical marker, an appropriate advancement flap was drawn incorporating the defect, outlining the appropriate donor tissue and placing the expected incisions within the nasal ala running parallel to the alar rim. The area thus outlined was incised with a #15 scalpel blade.  The skin margins were undermined minimally to an appropriate distance in all directions around the primary defect and laterally outward around the island pedicle utilizing iris scissors.  There was minimal undermining beneath the pedicle flap. Double Island Pedicle Flap Text: The defect edges were debeveled with a #15 scalpel blade.  Given the location of the defect, shape of the defect and the proximity to free margins a double island pedicle advancement flap was deemed most appropriate.  Using a sterile surgical marker, an appropriate advancement flap was drawn incorporating the defect, outlining the appropriate donor tissue and placing the expected incisions within the relaxed skin tension lines where possible.    The area thus outlined was incised deep to adipose tissue with a #15 scalpel blade.  The skin margins were undermined to an appropriate distance in all directions around the primary defect and laterally outward around the island pedicle utilizing iris scissors.  There was minimal undermining beneath the pedicle flap. Island Pedicle Flap-Requiring Vessel Identification Text: The defect edges were debeveled with a #15 scalpel blade.  Given the location of the defect, shape of the defect and the proximity to free margins an island pedicle advancement flap was deemed most appropriate.  Using a sterile surgical marker, an appropriate advancement flap was drawn, based on the axial vessel mentioned above, incorporating the defect, outlining the appropriate donor tissue and placing the expected incisions within the relaxed skin tension lines where possible.    The area thus outlined was incised deep to adipose tissue with a #15 scalpel blade.  The skin margins were undermined to an appropriate distance in all directions around the primary defect and laterally outward around the island pedicle utilizing iris scissors.  There was minimal undermining beneath the pedicle flap. Keystone Flap Text: The defect edges were debeveled with a #15 scalpel blade.  Given the location of the defect, shape of the defect a keystone flap was deemed most appropriate.  Using a sterile surgical marker, an appropriate keystone flap was drawn incorporating the defect, outlining the appropriate donor tissue and placing the expected incisions within the relaxed skin tension lines where possible. The area thus outlined was incised deep to adipose tissue with a #15 scalpel blade.  The skin margins were undermined to an appropriate distance in all directions around the primary defect and laterally outward around the flap utilizing iris scissors. O-T Plasty Text: The defect edges were debeveled with a #15 scalpel blade.  Given the location of the defect, shape of the defect and the proximity to free margins an O-T plasty was deemed most appropriate.  Using a sterile surgical marker, an appropriate O-T plasty was drawn incorporating the defect and placing the expected incisions within the relaxed skin tension lines where possible.    The area thus outlined was incised deep to adipose tissue with a #15 scalpel blade.  The skin margins were undermined to an appropriate distance in all directions utilizing iris scissors. O-Z Plasty Text: The defect edges were debeveled with a #15 scalpel blade.  Given the location of the defect, shape of the defect and the proximity to free margins an O-Z plasty (double transposition flap) was deemed most appropriate.  Using a sterile surgical marker, the appropriate transposition flaps were drawn incorporating the defect and placing the expected incisions within the relaxed skin tension lines where possible.    The area thus outlined was incised deep to adipose tissue with a #15 scalpel blade.  The skin margins were undermined to an appropriate distance in all directions utilizing iris scissors.  Hemostasis was achieved with electrocautery.  The flaps were then transposed into place, one clockwise and the other counterclockwise, and anchored with interrupted buried subcutaneous sutures. Double O-Z Plasty Text: The defect edges were debeveled with a #15 scalpel blade.  Given the location of the defect, shape of the defect and the proximity to free margins a Double O-Z plasty (double transposition flap) was deemed most appropriate.  Using a sterile surgical marker, the appropriate transposition flaps were drawn incorporating the defect and placing the expected incisions within the relaxed skin tension lines where possible. The area thus outlined was incised deep to adipose tissue with a #15 scalpel blade.  The skin margins were undermined to an appropriate distance in all directions utilizing iris scissors.  Hemostasis was achieved with electrocautery.  The flaps were then transposed into place, one clockwise and the other counterclockwise, and anchored with interrupted buried subcutaneous sutures. V-Y Plasty Text: The defect edges were debeveled with a #15 scalpel blade.  Given the location of the defect, shape of the defect and the proximity to free margins an V-Y advancement flap was deemed most appropriate.  Using a sterile surgical marker, an appropriate advancement flap was drawn incorporating the defect and placing the expected incisions within the relaxed skin tension lines where possible.    The area thus outlined was incised deep to adipose tissue with a #15 scalpel blade.  The skin margins were undermined to an appropriate distance in all directions utilizing iris scissors. H Plasty Text: Given the location of the defect, shape of the defect and the proximity to free margins a H-plasty was deemed most appropriate for repair.  Using a sterile surgical marker, the appropriate advancement arms of the H-plasty were drawn incorporating the defect and placing the expected incisions within the relaxed skin tension lines where possible. The area thus outlined was incised deep to adipose tissue with a #15 scalpel blade. The skin margins were undermined to an appropriate distance in all directions utilizing iris scissors.  The opposing advancement arms were then advanced into place in opposite direction and anchored with interrupted buried subcutaneous sutures. W Plasty Text: The lesion was extirpated to the level of the fat with a #15 scalpel blade.  Given the location of the defect, shape of the defect and the proximity to free margins a W-plasty was deemed most appropriate for repair.  Using a sterile surgical marker, the appropriate transposition arms of the W-plasty were drawn incorporating the defect and placing the expected incisions within the relaxed skin tension lines where possible.    The area thus outlined was incised deep to adipose tissue with a #15 scalpel blade.  The skin margins were undermined to an appropriate distance in all directions utilizing iris scissors.  The opposing transposition arms were then transposed into place in opposite direction and anchored with interrupted buried subcutaneous sutures. Z Plasty Text: The lesion was extirpated to the level of the fat with a #15 scalpel blade.  Given the location of the defect, shape of the defect and the proximity to free margins a Z-plasty was deemed most appropriate for repair.  Using a sterile surgical marker, the appropriate transposition arms of the Z-plasty were drawn incorporating the defect and placing the expected incisions within the relaxed skin tension lines where possible.    The area thus outlined was incised deep to adipose tissue with a #15 scalpel blade.  The skin margins were undermined to an appropriate distance in all directions utilizing iris scissors.  The opposing transposition arms were then transposed into place in opposite direction and anchored with interrupted buried subcutaneous sutures. Double Z Plasty Text: The lesion was extirpated to the level of the fat with a #15 scalpel blade. Given the location of the defect, shape of the defect and the proximity to free margins a double Z-plasty was deemed most appropriate for repair. Using a sterile surgical marker, the appropriate transposition arms of the double Z-plasty were drawn incorporating the defect and placing the expected incisions within the relaxed skin tension lines where possible. The area thus outlined was incised deep to adipose tissue with a #15 scalpel blade. The skin margins were undermined to an appropriate distance in all directions utilizing iris scissors. The opposing transposition arms were then transposed and carried over into place in opposite direction and anchored with interrupted buried subcutaneous sutures. Zygomaticofacial Flap Text: Given the location of the defect, shape of the defect and the proximity to free margins a zygomaticofacial flap was deemed most appropriate for repair.  Using a sterile surgical marker, the appropriate flap was drawn incorporating the defect and placing the expected incisions within the relaxed skin tension lines where possible. The area thus outlined was incised deep to adipose tissue with a #15 scalpel blade with preservation of a vascular pedicle.  The skin margins were undermined to an appropriate distance in all directions utilizing iris scissors.  The flap was then placed into the defect and anchored with interrupted buried subcutaneous sutures. Cheek Interpolation Flap Text: A decision was made to reconstruct the defect utilizing an interpolation axial flap and a staged reconstruction.  A telfa template was made of the defect.  This telfa template was then used to outline the Cheek Interpolation flap.  The donor area for the pedicle flap was then injected with anesthesia.  The flap was excised through the skin and subcutaneous tissue down to the layer of the underlying musculature.  The interpolation flap was carefully excised within this deep plane to maintain its blood supply.  The edges of the donor site were undermined.   The donor site was closed in a primary fashion.  The pedicle was then rotated into position and sutured.  Once the tube was sutured into place, adequate blood supply was confirmed with blanching and refill.  The pedicle was then wrapped with xeroform gauze and dressed appropriately with a telfa and gauze bandage to ensure continued blood supply and protect the attached pedicle. Cheek-To-Nose Interpolation Flap Text: A decision was made to reconstruct the defect utilizing an interpolation axial flap and a staged reconstruction.  A telfa template was made of the defect.  This telfa template was then used to outline the Cheek-To-Nose Interpolation flap.  The donor area for the pedicle flap was then injected with anesthesia.  The flap was excised through the skin and subcutaneous tissue down to the layer of the underlying musculature.  The interpolation flap was carefully excised within this deep plane to maintain its blood supply.  The edges of the donor site were undermined.   The donor site was closed in a primary fashion.  The pedicle was then rotated into position and sutured.  Once the tube was sutured into place, adequate blood supply was confirmed with blanching and refill.  The pedicle was then wrapped with xeroform gauze and dressed appropriately with a telfa and gauze bandage to ensure continued blood supply and protect the attached pedicle. Interpolation Flap Text: A decision was made to reconstruct the defect utilizing an interpolation axial flap and a staged reconstruction.  A telfa template was made of the defect.  This telfa template was then used to outline the interpolation flap.  The donor area for the pedicle flap was then injected with anesthesia.  The flap was excised through the skin and subcutaneous tissue down to the layer of the underlying musculature.  The interpolation flap was carefully excised within this deep plane to maintain its blood supply.  The edges of the donor site were undermined.   The donor site was closed in a primary fashion.  The pedicle was then rotated into position and sutured.  Once the tube was sutured into place, adequate blood supply was confirmed with blanching and refill.  The pedicle was then wrapped with xeroform gauze and dressed appropriately with a telfa and gauze bandage to ensure continued blood supply and protect the attached pedicle. Melolabial Interpolation Flap Text: A decision was made to reconstruct the defect utilizing an interpolation axial flap and a staged reconstruction.  A telfa template was made of the defect.  This telfa template was then used to outline the melolabial interpolation flap.  The donor area for the pedicle flap was then injected with anesthesia.  The flap was excised through the skin and subcutaneous tissue down to the layer of the underlying musculature.  The pedicle flap was carefully excised within this deep plane to maintain its blood supply.  The edges of the donor site were undermined.   The donor site was closed in a primary fashion.  The pedicle was then rotated into position and sutured.  Once the tube was sutured into place, adequate blood supply was confirmed with blanching and refill.  The pedicle was then wrapped with xeroform gauze and dressed appropriately with a telfa and gauze bandage to ensure continued blood supply and protect the attached pedicle. Mastoid Interpolation Flap Text: A decision was made to reconstruct the defect utilizing an interpolation axial flap and a staged reconstruction.  A telfa template was made of the defect.  This telfa template was then used to outline the mastoid interpolation flap.  The donor area for the pedicle flap was then injected with anesthesia.  The flap was excised through the skin and subcutaneous tissue down to the layer of the underlying musculature.  The pedicle flap was carefully excised within this deep plane to maintain its blood supply.  The edges of the donor site were undermined.   The donor site was closed in a primary fashion.  The pedicle was then rotated into position and sutured.  Once the tube was sutured into place, adequate blood supply was confirmed with blanching and refill.  The pedicle was then wrapped with xeroform gauze and dressed appropriately with a telfa and gauze bandage to ensure continued blood supply and protect the attached pedicle. Posterior Auricular Interpolation Flap Text: A decision was made to reconstruct the defect utilizing an interpolation axial flap and a staged reconstruction.  A telfa template was made of the defect.  This telfa template was then used to outline the posterior auricular interpolation flap.  The donor area for the pedicle flap was then injected with anesthesia.  The flap was excised through the skin and subcutaneous tissue down to the layer of the underlying musculature.  The pedicle flap was carefully excised within this deep plane to maintain its blood supply.  The edges of the donor site were undermined.   The donor site was closed in a primary fashion.  The pedicle was then rotated into position and sutured.  Once the tube was sutured into place, adequate blood supply was confirmed with blanching and refill.  The pedicle was then wrapped with xeroform gauze and dressed appropriately with a telfa and gauze bandage to ensure continued blood supply and protect the attached pedicle. Paramedian Forehead Flap Text: A decision was made to reconstruct the defect utilizing an interpolation axial flap and a staged reconstruction.  A telfa template was made of the defect.  This telfa template was then used to outline the paramedian forehead pedicle flap.  The donor area for the pedicle flap was then injected with anesthesia.  The flap was excised through the skin and subcutaneous tissue down to the layer of the underlying musculature.  The pedicle flap was carefully excised within this deep plane to maintain its blood supply.  The edges of the donor site were undermined.   The donor site was closed in a primary fashion.  The pedicle was then rotated into position and sutured.  Once the tube was sutured into place, adequate blood supply was confirmed with blanching and refill.  The pedicle was then wrapped with xeroform gauze and dressed appropriately with a telfa and gauze bandage to ensure continued blood supply and protect the attached pedicle. Abbe Flap (Upper To Lower Lip) Text: The defect of the lower lip was assessed and measured.  Given the location and size of the defect, an Abbe flap was deemed most appropriate.  Using a sterile surgical marker, an appropriate Abbe flap was measured and drawn on the upper lip. Local anesthesia was then infiltrated.  A scalpel was then used to incise the upper lip through and through the skin, vermilion, muscle and mucosa, leaving the flap pedicled on the opposite side.  The flap was then rotated and transferred to the lower lip defect.  The flap was then sutured into place with a three layer technique, closing the orbicularis oris muscle layer with subcutaneous buried sutures, followed by a mucosal layer and an epidermal layer. Abbe Flap (Lower To Upper Lip) Text: The defect of the upper lip was assessed and measured.  Given the location and size of the defect, an Abbe flap was deemed most appropriate.  Using a sterile surgical marker, an appropriate Abbe flap was measured and drawn on the lower lip. Local anesthesia was then infiltrated. A scalpel was then used to incise the upper lip through and through the skin, vermilion, muscle and mucosa, leaving the flap pedicled on the opposite side.  The flap was then rotated and transferred to the lower lip defect.  The flap was then sutured into place with a three layer technique, closing the orbicularis oris muscle layer with subcutaneous buried sutures, followed by a mucosal layer and an epidermal layer. Estlander Flap (Upper To Lower Lip) Text: The defect of the lower lip was assessed and measured.  Given the location and size of the defect, an Estlander flap was deemed most appropriate.  Using a sterile surgical marker, an appropriate Estlander flap was measured and drawn on the upper lip. Local anesthesia was then infiltrated. A scalpel was then used to incise the lateral aspect of the flap, through skin, muscle and mucosa, leaving the flap pedicled medially.  The flap was then rotated and positioned to fill the lower lip defect.  The flap was then sutured into place with a three layer technique, closing the orbicularis oris muscle layer with subcutaneous buried sutures, followed by a mucosal layer and an epidermal layer. Lip Wedge Excision Repair Text: Given the location of the defect and the proximity to free margins a full thickness wedge repair was deemed most appropriate.  Using a sterile surgical marker, the appropriate repair was drawn incorporating the defect and placing the expected incisions perpendicular to the vermilion border.  The vermilion border was also meticulously outlined to ensure appropriate reapproximation during the repair.  The area thus outlined was incised through and through with a #15 scalpel blade.  The muscularis and dermis were reaproximated with deep sutures following hemostasis. Care was taken to realign the vermilion border before proceeding with the superficial closure.  Once the vermilion was realigned the superfical and mucosal closure was finished. Ftsg Text: The defect edges were debeveled with a #15 scalpel blade.  Given the location of the defect, shape of the defect and the proximity to free margins a full thickness skin graft was deemed most appropriate.  Using a sterile surgical marker, the primary defect shape was transferred to the donor site. The area thus outlined was incised deep to adipose tissue with a #15 scalpel blade.  The harvested graft was then trimmed of adipose tissue until only dermis and epidermis was left.  The skin margins of the secondary defect were undermined to an appropriate distance in all directions utilizing iris scissors.  The secondary defect was closed with interrupted buried subcutaneous sutures.  The skin edges were then re-apposed with running  sutures.  The skin graft was then placed in the primary defect and oriented appropriately. Split-Thickness Skin Graft Text: The defect edges were debeveled with a #15 scalpel blade.  Given the location of the defect, shape of the defect and the proximity to free margins a split thickness skin graft was deemed most appropriate.  Using a sterile surgical marker, the primary defect shape was transferred to the donor site. The split thickness graft was then harvested.  The skin graft was then placed in the primary defect and oriented appropriately. Pinch Graft Text: The defect edges were debeveled with a #15 scalpel blade. Given the location of the defect, shape of the defect and the proximity to free margins a pinch graft was deemed most appropriate. Using a sterile surgical marker, the primary defect shape was transferred to the donor site. The area thus outlined was incised deep to adipose tissue with a #15 scalpel blade.  The harvested graft was then trimmed of adipose tissue until only dermis and epidermis was left. The skin margins of the secondary defect were undermined to an appropriate distance in all directions utilizing iris scissors.  The secondary defect was closed with interrupted buried subcutaneous sutures.  The skin edges were then re-apposed with running  sutures.  The skin graft was then placed in the primary defect and oriented appropriately. Burow's Graft Text: The defect edges were debeveled with a #15 scalpel blade.  Given the location of the defect, shape of the defect, the proximity to free margins and the presence of a standing cone deformity a Burow's skin graft was deemed most appropriate. The standing cone was removed and this tissue was then trimmed to the shape of the primary defect. The adipose tissue was also removed until only dermis and epidermis were left.  The skin margins of the secondary defect were undermined to an appropriate distance in all directions utilizing iris scissors.  The secondary defect was closed with interrupted buried subcutaneous sutures.  The skin edges were then re-apposed with running  sutures.  The skin graft was then placed in the primary defect and oriented appropriately. Cartilage Graft Text: The defect edges were debeveled with a #15 scalpel blade.  Given the location of the defect, shape of the defect, the fact the defect involved a full thickness cartilage defect a cartilage graft was deemed most appropriate.  An appropriate donor site was identified, cleansed, and anesthetized. The cartilage graft was then harvested and transferred to the recipient site, oriented appropriately and then sutured into place.  The secondary defect was then repaired using a primary closure. Composite Graft Text: The defect edges were debeveled with a #15 scalpel blade.  Given the location of the defect, shape of the defect, the proximity to free margins and the fact the defect was full thickness a composite graft was deemed most appropriate.  The defect was outline and then transferred to the donor site.  A full thickness graft was then excised from the donor site. The graft was then placed in the primary defect, oriented appropriately and then sutured into place.  The secondary defect was then repaired using a primary closure. Epidermal Autograft Text: The defect edges were debeveled with a #15 scalpel blade.  Given the location of the defect, shape of the defect and the proximity to free margins an epidermal autograft was deemed most appropriate.  Using a sterile surgical marker, the primary defect shape was transferred to the donor site. The epidermal graft was then harvested.  The skin graft was then placed in the primary defect and oriented appropriately. Dermal Autograft Text: The defect edges were debeveled with a #15 scalpel blade.  Given the location of the defect, shape of the defect and the proximity to free margins a dermal autograft was deemed most appropriate.  Using a sterile surgical marker, the primary defect shape was transferred to the donor site. The area thus outlined was incised deep to adipose tissue with a #15 scalpel blade.  The harvested graft was then trimmed of adipose and epidermal tissue until only dermis was left.  The skin graft was then placed in the primary defect and oriented appropriately. Skin Substitute Text: The defect edges were debeveled with a #15 scalpel blade.  Given the location of the defect, shape of the defect and the proximity to free margins a skin substitute graft was deemed most appropriate.  The graft material was trimmed to fit the size of the defect. The graft was then placed in the primary defect and oriented appropriately. Tissue Cultured Epidermal Autograft Text: The defect edges were debeveled with a #15 scalpel blade.  Given the location of the defect, shape of the defect and the proximity to free margins a tissue cultured epidermal autograft was deemed most appropriate.  The graft was then trimmed to fit the size of the defect.  The graft was then placed in the primary defect and oriented appropriately. Xenograft Text: The defect edges were debeveled with a #15 scalpel blade.  Given the location of the defect, shape of the defect and the proximity to free margins a xenograft was deemed most appropriate.  The graft was then trimmed to fit the size of the defect.  The graft was then placed in the primary defect and oriented appropriately. Purse String (Intermediate) Text: Given the location of the defect and the characteristics of the surrounding skin a purse string intermediate closure was deemed most appropriate.  Undermining was performed circumferentially around the surgical defect.  A purse string suture was then placed and tightened. Purse String (Simple) Text: Given the location of the defect and the characteristics of the surrounding skin a purse string simple closure was deemed most appropriate.  Undermining was performed circumferentially around the surgical defect.  A purse string suture was then placed and tightened. Partial Purse String (Intermediate) Text: Given the location of the defect and the characteristics of the surrounding skin an intermediate purse string closure was deemed most appropriate.  Undermining was performed circumferentially around the surgical defect.  A purse string suture was then placed and tightened. Wound tension of the circular defect prevented complete closure of the wound. Partial Purse String (Simple) Text: Given the location of the defect and the characteristics of the surrounding skin a simple purse string closure was deemed most appropriate.  Undermining was performed circumferentially around the surgical defect.  A purse string suture was then placed and tightened. Wound tension of the circular defect prevented complete closure of the wound. Complex Repair And Single Advancement Flap Text: The defect edges were debeveled with a #15 scalpel blade.  The primary defect was closed partially with a complex linear closure.  Given the location of the remaining defect, shape of the defect and the proximity to free margins a single advancement flap was deemed most appropriate for complete closure of the defect.  Using a sterile surgical marker, an appropriate advancement flap was drawn incorporating the defect and placing the expected incisions within the relaxed skin tension lines where possible.    The area thus outlined was incised deep to adipose tissue with a #15 scalpel blade.  The skin margins were undermined to an appropriate distance in all directions utilizing iris scissors. Complex Repair And Double Advancement Flap Text: The defect edges were debeveled with a #15 scalpel blade.  The primary defect was closed partially with a complex linear closure.  Given the location of the remaining defect, shape of the defect and the proximity to free margins a double advancement flap was deemed most appropriate for complete closure of the defect.  Using a sterile surgical marker, an appropriate advancement flap was drawn incorporating the defect and placing the expected incisions within the relaxed skin tension lines where possible.    The area thus outlined was incised deep to adipose tissue with a #15 scalpel blade.  The skin margins were undermined to an appropriate distance in all directions utilizing iris scissors. Complex Repair And Modified Advancement Flap Text: The defect edges were debeveled with a #15 scalpel blade.  The primary defect was closed partially with a complex linear closure.  Given the location of the remaining defect, shape of the defect and the proximity to free margins a modified advancement flap was deemed most appropriate for complete closure of the defect.  Using a sterile surgical marker, an appropriate advancement flap was drawn incorporating the defect and placing the expected incisions within the relaxed skin tension lines where possible.    The area thus outlined was incised deep to adipose tissue with a #15 scalpel blade.  The skin margins were undermined to an appropriate distance in all directions utilizing iris scissors. Complex Repair And A-T Advancement Flap Text: The defect edges were debeveled with a #15 scalpel blade.  The primary defect was closed partially with a complex linear closure.  Given the location of the remaining defect, shape of the defect and the proximity to free margins an A-T advancement flap was deemed most appropriate for complete closure of the defect.  Using a sterile surgical marker, an appropriate advancement flap was drawn incorporating the defect and placing the expected incisions within the relaxed skin tension lines where possible.    The area thus outlined was incised deep to adipose tissue with a #15 scalpel blade.  The skin margins were undermined to an appropriate distance in all directions utilizing iris scissors. Complex Repair And O-T Advancement Flap Text: The defect edges were debeveled with a #15 scalpel blade.  The primary defect was closed partially with a complex linear closure.  Given the location of the remaining defect, shape of the defect and the proximity to free margins an O-T advancement flap was deemed most appropriate for complete closure of the defect.  Using a sterile surgical marker, an appropriate advancement flap was drawn incorporating the defect and placing the expected incisions within the relaxed skin tension lines where possible.    The area thus outlined was incised deep to adipose tissue with a #15 scalpel blade.  The skin margins were undermined to an appropriate distance in all directions utilizing iris scissors. Complex Repair And O-L Flap Text: The defect edges were debeveled with a #15 scalpel blade.  The primary defect was closed partially with a complex linear closure.  Given the location of the remaining defect, shape of the defect and the proximity to free margins an O-L flap was deemed most appropriate for complete closure of the defect.  Using a sterile surgical marker, an appropriate flap was drawn incorporating the defect and placing the expected incisions within the relaxed skin tension lines where possible.    The area thus outlined was incised deep to adipose tissue with a #15 scalpel blade.  The skin margins were undermined to an appropriate distance in all directions utilizing iris scissors. Complex Repair And Bilobe Flap Text: The defect edges were debeveled with a #15 scalpel blade.  The primary defect was closed partially with a complex linear closure.  Given the location of the remaining defect, shape of the defect and the proximity to free margins a bilobe flap was deemed most appropriate for complete closure of the defect.  Using a sterile surgical marker, an appropriate advancement flap was drawn incorporating the defect and placing the expected incisions within the relaxed skin tension lines where possible.    The area thus outlined was incised deep to adipose tissue with a #15 scalpel blade.  The skin margins were undermined to an appropriate distance in all directions utilizing iris scissors. Complex Repair And Melolabial Flap Text: The defect edges were debeveled with a #15 scalpel blade.  The primary defect was closed partially with a complex linear closure.  Given the location of the remaining defect, shape of the defect and the proximity to free margins a melolabial flap was deemed most appropriate for complete closure of the defect.  Using a sterile surgical marker, an appropriate advancement flap was drawn incorporating the defect and placing the expected incisions within the relaxed skin tension lines where possible.    The area thus outlined was incised deep to adipose tissue with a #15 scalpel blade.  The skin margins were undermined to an appropriate distance in all directions utilizing iris scissors. Complex Repair And Rotation Flap Text: The defect edges were debeveled with a #15 scalpel blade.  The primary defect was closed partially with a complex linear closure.  Given the location of the remaining defect, shape of the defect and the proximity to free margins a rotation flap was deemed most appropriate for complete closure of the defect.  Using a sterile surgical marker, an appropriate advancement flap was drawn incorporating the defect and placing the expected incisions within the relaxed skin tension lines where possible.    The area thus outlined was incised deep to adipose tissue with a #15 scalpel blade.  The skin margins were undermined to an appropriate distance in all directions utilizing iris scissors. Complex Repair And Rhombic Flap Text: The defect edges were debeveled with a #15 scalpel blade.  The primary defect was closed partially with a complex linear closure.  Given the location of the remaining defect, shape of the defect and the proximity to free margins a rhombic flap was deemed most appropriate for complete closure of the defect.  Using a sterile surgical marker, an appropriate advancement flap was drawn incorporating the defect and placing the expected incisions within the relaxed skin tension lines where possible.    The area thus outlined was incised deep to adipose tissue with a #15 scalpel blade.  The skin margins were undermined to an appropriate distance in all directions utilizing iris scissors. Complex Repair And Transposition Flap Text: The defect edges were debeveled with a #15 scalpel blade.  The primary defect was closed partially with a complex linear closure.  Given the location of the remaining defect, shape of the defect and the proximity to free margins a transposition flap was deemed most appropriate for complete closure of the defect.  Using a sterile surgical marker, an appropriate advancement flap was drawn incorporating the defect and placing the expected incisions within the relaxed skin tension lines where possible.    The area thus outlined was incised deep to adipose tissue with a #15 scalpel blade.  The skin margins were undermined to an appropriate distance in all directions utilizing iris scissors. Complex Repair And V-Y Plasty Text: The defect edges were debeveled with a #15 scalpel blade.  The primary defect was closed partially with a complex linear closure.  Given the location of the remaining defect, shape of the defect and the proximity to free margins a V-Y plasty was deemed most appropriate for complete closure of the defect.  Using a sterile surgical marker, an appropriate advancement flap was drawn incorporating the defect and placing the expected incisions within the relaxed skin tension lines where possible.    The area thus outlined was incised deep to adipose tissue with a #15 scalpel blade.  The skin margins were undermined to an appropriate distance in all directions utilizing iris scissors. Complex Repair And M Plasty Text: The defect edges were debeveled with a #15 scalpel blade.  The primary defect was closed partially with a complex linear closure.  Given the location of the remaining defect, shape of the defect and the proximity to free margins an M plasty was deemed most appropriate for complete closure of the defect.  Using a sterile surgical marker, an appropriate advancement flap was drawn incorporating the defect and placing the expected incisions within the relaxed skin tension lines where possible.    The area thus outlined was incised deep to adipose tissue with a #15 scalpel blade.  The skin margins were undermined to an appropriate distance in all directions utilizing iris scissors. Complex Repair And Double M Plasty Text: The defect edges were debeveled with a #15 scalpel blade.  The primary defect was closed partially with a complex linear closure.  Given the location of the remaining defect, shape of the defect and the proximity to free margins a double M plasty was deemed most appropriate for complete closure of the defect.  Using a sterile surgical marker, an appropriate advancement flap was drawn incorporating the defect and placing the expected incisions within the relaxed skin tension lines where possible.    The area thus outlined was incised deep to adipose tissue with a #15 scalpel blade.  The skin margins were undermined to an appropriate distance in all directions utilizing iris scissors. Complex Repair And W Plasty Text: The defect edges were debeveled with a #15 scalpel blade.  The primary defect was closed partially with a complex linear closure.  Given the location of the remaining defect, shape of the defect and the proximity to free margins a W plasty was deemed most appropriate for complete closure of the defect.  Using a sterile surgical marker, an appropriate advancement flap was drawn incorporating the defect and placing the expected incisions within the relaxed skin tension lines where possible.    The area thus outlined was incised deep to adipose tissue with a #15 scalpel blade.  The skin margins were undermined to an appropriate distance in all directions utilizing iris scissors. Complex Repair And Z Plasty Text: The defect edges were debeveled with a #15 scalpel blade.  The primary defect was closed partially with a complex linear closure.  Given the location of the remaining defect, shape of the defect and the proximity to free margins a Z plasty was deemed most appropriate for complete closure of the defect.  Using a sterile surgical marker, an appropriate advancement flap was drawn incorporating the defect and placing the expected incisions within the relaxed skin tension lines where possible.    The area thus outlined was incised deep to adipose tissue with a #15 scalpel blade.  The skin margins were undermined to an appropriate distance in all directions utilizing iris scissors. Complex Repair And Dorsal Nasal Flap Text: The defect edges were debeveled with a #15 scalpel blade.  The primary defect was closed partially with a complex linear closure.  Given the location of the remaining defect, shape of the defect and the proximity to free margins a dorsal nasal flap was deemed most appropriate for complete closure of the defect.  Using a sterile surgical marker, an appropriate flap was drawn incorporating the defect and placing the expected incisions within the relaxed skin tension lines where possible.    The area thus outlined was incised deep to adipose tissue with a #15 scalpel blade.  The skin margins were undermined to an appropriate distance in all directions utilizing iris scissors. Complex Repair And Ftsg Text: The defect edges were debeveled with a #15 scalpel blade.  The primary defect was closed partially with a complex linear closure.  Given the location of the defect, shape of the defect and the proximity to free margins a full thickness skin graft was deemed most appropriate to repair the remaining defect.  The graft was trimmed to fit the size of the remaining defect.  The graft was then placed in the primary defect, oriented appropriately, and sutured into place. Complex Repair And Burow's Graft Text: The defect edges were debeveled with a #15 scalpel blade.  The primary defect was closed partially with a complex linear closure.  Given the location of the defect, shape of the defect, the proximity to free margins and the presence of a standing cone deformity a Burow's graft was deemed most appropriate to repair the remaining defect.  The graft was trimmed to fit the size of the remaining defect.  The graft was then placed in the primary defect, oriented appropriately, and sutured into place. Complex Repair And Split-Thickness Skin Graft Text: The defect edges were debeveled with a #15 scalpel blade.  The primary defect was closed partially with a complex linear closure.  Given the location of the defect, shape of the defect and the proximity to free margins a split thickness skin graft was deemed most appropriate to repair the remaining defect.  The graft was trimmed to fit the size of the remaining defect.  The graft was then placed in the primary defect, oriented appropriately, and sutured into place. Complex Repair And Epidermal Autograft Text: The defect edges were debeveled with a #15 scalpel blade.  The primary defect was closed partially with a complex linear closure.  Given the location of the defect, shape of the defect and the proximity to free margins an epidermal autograft was deemed most appropriate to repair the remaining defect.  The graft was trimmed to fit the size of the remaining defect.  The graft was then placed in the primary defect, oriented appropriately, and sutured into place. Complex Repair And Dermal Autograft Text: The defect edges were debeveled with a #15 scalpel blade.  The primary defect was closed partially with a complex linear closure.  Given the location of the defect, shape of the defect and the proximity to free margins an dermal autograft was deemed most appropriate to repair the remaining defect.  The graft was trimmed to fit the size of the remaining defect.  The graft was then placed in the primary defect, oriented appropriately, and sutured into place. Complex Repair And Tissue Cultured Epidermal Autograft Text: The defect edges were debeveled with a #15 scalpel blade.  The primary defect was closed partially with a complex linear closure.  Given the location of the defect, shape of the defect and the proximity to free margins an tissue cultured epidermal autograft was deemed most appropriate to repair the remaining defect.  The graft was trimmed to fit the size of the remaining defect.  The graft was then placed in the primary defect, oriented appropriately, and sutured into place. Complex Repair And Xenograft Text: The defect edges were debeveled with a #15 scalpel blade.  The primary defect was closed partially with a complex linear closure.  Given the location of the defect, shape of the defect and the proximity to free margins a xenograft was deemed most appropriate to repair the remaining defect.  The graft was trimmed to fit the size of the remaining defect.  The graft was then placed in the primary defect, oriented appropriately, and sutured into place. Complex Repair And Skin Substitute Graft Text: The defect edges were debeveled with a #15 scalpel blade.  The primary defect was closed partially with a complex linear closure.  Given the location of the remaining defect, shape of the defect and the proximity to free margins a skin substitute graft was deemed most appropriate to repair the remaining defect.  The graft was trimmed to fit the size of the remaining defect.  The graft was then placed in the primary defect, oriented appropriately, and sutured into place. Path Notes (To The Dermatopathologist): Please check margins. Consent was obtained from the patient. The risks and benefits to therapy were discussed in detail. Specifically, the risks of infection, scarring, bleeding, prolonged wound healing, incomplete removal, allergy to anesthesia, nerve injury and recurrence were addressed. Prior to the procedure, the treatment site was clearly identified and confirmed by the patient. All components of Universal Protocol/PAUSE Rule completed. Post-Care Instructions: I reviewed with the patient in detail post-care instructions. Patient is not to engage in any heavy lifting, exercise, or swimming for the next 14 days. Should the patient develop any fevers, chills, bleeding, severe pain patient will contact the office immediately. Home Suture Removal Text: Patient was provided a home suture removal kit and will remove their sutures at home.  If they have any questions or difficulties they will call the office. Where Do You Want The Question To Include Opioid Counseling Located?: Case Summary Tab Information: Selecting Yes will display possible errors in your note based on the variables you have selected. This validation is only offered as a suggestion for you. PLEASE NOTE THAT THE VALIDATION TEXT WILL BE REMOVED WHEN YOU FINALIZE YOUR NOTE. IF YOU WANT TO FAX A PRELIMINARY NOTE YOU WILL NEED TO TOGGLE THIS TO 'NO' IF YOU DO NOT WANT IT IN YOUR FAXED NOTE.

## 2023-11-17 DIAGNOSIS — F32.A ANXIETY AND DEPRESSION: ICD-10-CM

## 2023-11-17 DIAGNOSIS — F41.9 ANXIETY AND DEPRESSION: ICD-10-CM

## 2023-11-17 RX ORDER — MELOXICAM 15 MG/1
15 TABLET ORAL DAILY
Qty: 90 TABLET | Refills: 0 | Status: SHIPPED | OUTPATIENT
Start: 2023-11-17

## 2023-11-17 RX ORDER — VENLAFAXINE HYDROCHLORIDE 75 MG/1
75 CAPSULE, EXTENDED RELEASE ORAL DAILY
Qty: 90 CAPSULE | Refills: 0 | Status: SHIPPED | OUTPATIENT
Start: 2023-11-17

## 2023-11-17 NOTE — TELEPHONE ENCOUNTER
"Caller: Nita Mcmahon PHANI \"Karuna\"    Relationship: Self    Best call back number: [phone]     Requested Prescriptions:   Requested Prescriptions     Pending Prescriptions Disp Refills    meloxicam (MOBIC) 15 MG tablet 90 tablet 0     Sig: Take 1 tablet by mouth Daily.    venlafaxine XR (EFFEXOR-XR) 75 MG 24 hr capsule 90 capsule 0     Sig: Take 1 capsule by mouth Daily.        Pharmacy where request should be sent: [address] [phone] [facility] [phone]      Last office visit with prescribing clinician: 6/5/2023   Last telemedicine visit with prescribing clinician: Visit date not found   Next office visit with prescribing clinician: 12/5/2023     Additional details provided by patient: NO MEDICATION ON HAND     Does the patient have less than a 3 day supply:  [x] Yes  [] No    Would you like a call back once the refill request has been completed: [x] Yes [] No    If the office needs to give you a call back, can they leave a voicemail: [] Yes [] No    Jeff Tipton Rep   11/17/23 10:55 EST           "

## 2023-12-05 ENCOUNTER — OFFICE VISIT (OUTPATIENT)
Dept: INTERNAL MEDICINE | Facility: CLINIC | Age: 53
End: 2023-12-05
Payer: COMMERCIAL

## 2023-12-05 VITALS
WEIGHT: 198 LBS | BODY MASS INDEX: 36.44 KG/M2 | DIASTOLIC BLOOD PRESSURE: 96 MMHG | HEIGHT: 62 IN | HEART RATE: 84 BPM | SYSTOLIC BLOOD PRESSURE: 144 MMHG | OXYGEN SATURATION: 98 %

## 2023-12-05 DIAGNOSIS — F32.A ANXIETY AND DEPRESSION: ICD-10-CM

## 2023-12-05 DIAGNOSIS — I10 BENIGN ESSENTIAL HTN: ICD-10-CM

## 2023-12-05 DIAGNOSIS — E66.01 CLASS 2 SEVERE OBESITY DUE TO EXCESS CALORIES WITH SERIOUS COMORBIDITY AND BODY MASS INDEX (BMI) OF 36.0 TO 36.9 IN ADULT: ICD-10-CM

## 2023-12-05 DIAGNOSIS — Z23 NEED FOR INFLUENZA VACCINATION: ICD-10-CM

## 2023-12-05 DIAGNOSIS — F41.9 ANXIETY AND DEPRESSION: ICD-10-CM

## 2023-12-05 DIAGNOSIS — T78.40XA ALLERGY, INITIAL ENCOUNTER: ICD-10-CM

## 2023-12-05 DIAGNOSIS — R25.2 MUSCLE CRAMPS: Primary | ICD-10-CM

## 2023-12-05 RX ORDER — VENLAFAXINE HYDROCHLORIDE 75 MG/1
75 CAPSULE, EXTENDED RELEASE ORAL DAILY
Qty: 90 CAPSULE | Refills: 3 | Status: SHIPPED | OUTPATIENT
Start: 2023-12-05

## 2023-12-05 RX ORDER — MELOXICAM 15 MG/1
15 TABLET ORAL DAILY
Qty: 90 TABLET | Refills: 3 | Status: SHIPPED | OUTPATIENT
Start: 2023-12-05

## 2023-12-05 RX ORDER — LISINOPRIL 10 MG/1
10 TABLET ORAL DAILY
Qty: 30 TABLET | Refills: 5 | Status: SHIPPED | OUTPATIENT
Start: 2023-12-05

## 2023-12-05 RX ORDER — ESTRADIOL 1 MG/1
1 TABLET ORAL DAILY
Qty: 90 TABLET | Refills: 3 | Status: SHIPPED | OUTPATIENT
Start: 2023-12-05

## 2023-12-05 NOTE — PROGRESS NOTES
"Subjective   Nita Mcmahon is a 53 y.o. female here for muscle cramps, allergy, elevated BP.  She says she had been having muscle cramps in her feet, now present in the backs of her legs.  She has used a topical magnesium foam and for the last 2 days she has not had any cramps.  The cramps do occur during the day as well as at night.  She is seeing allergy, they are going to do some chemical testing.  She has a rash on her chest that has not gone away after prednisone.  She has noted elevated blood pressure at allergy and the last 2 visits here.  She is concerned about this as she has significant family history of cardiovascular disease.  She is worried about her weight, would like to discuss.  Mood has been okay.    I have reviewed the patient's relevant medical history and confirmed it is accurate.    I have personally reviewed and performed the ROS. Mary Dixon MD     Objective   /96 (BP Location: Left arm)   Pulse 84   Ht 157.5 cm (62\")   Wt 89.8 kg (198 lb)   SpO2 98%   BMI 36.21 kg/m²     Physical Exam  Vitals reviewed.   Constitutional:       Appearance: She is well-developed.   Pulmonary:      Effort: Pulmonary effort is normal.   Neurological:      General: No focal deficit present.      Mental Status: She is alert.   Psychiatric:         Behavior: Behavior normal.         Thought Content: Thought content normal.         Judgment: Judgment normal.           Current Outpatient Medications:     albuterol sulfate HFA (ProAir HFA) 108 (90 Base) MCG/ACT inhaler, Inhale 1 puff 4 (Four) Times a Day. (Patient taking differently: Inhale 1 puff At Night As Needed.), Disp: 18 g, Rfl: 11    estradiol (ESTRACE) 1 MG tablet, Take 1 tablet by mouth Daily., Disp: 90 tablet, Rfl: 2    levocetirizine (XYZAL) 5 MG tablet, Take 1 tablet by mouth 2 (Two) Times a Day., Disp: , Rfl:     meloxicam (MOBIC) 15 MG tablet, Take 1 tablet by mouth Daily., Disp: 90 tablet, Rfl: 0    multivitamin with minerals " tablet tablet, Take 1 tablet by mouth Daily., Disp: , Rfl:     venlafaxine XR (EFFEXOR-XR) 75 MG 24 hr capsule, Take 1 capsule by mouth Daily., Disp: 90 capsule, Rfl: 0    Assessment & Plan   Diagnoses and all orders for this visit:    1. Muscle cramps (Primary)  -improved with topical Mg    2. Need for influenza vaccination  -     Fluzone (or Fluarix & Flulaval for VFC) >6 Mos ([id number])    3. Allergy, initial encounter  -Seeing allergy, chemical testing pending    4. Anxiety and depression  -     venlafaxine XR (EFFEXOR-XR) 75 MG 24 hr capsule; Take 1 capsule by mouth Daily.  Dispense: 90 capsule; Refill: 3    5. Benign essential HTN  -     lisinopril (PRINIVIL,ZESTRIL) 10 MG tablet; Take 1 tablet by mouth Daily.  Dispense: 30 tablet; Refill: 5  -New med, return in 2 weeks with BMP and blood pressure cuff    6. Class 2 severe obesity due to excess calories with serious comorbidity and body mass index (BMI) of 36.0 to 36.9 in adult  -     Ambulatory Referral to Weight Management Program  Body mass index is 36.21 kg/m².             The 10-year ASCVD risk score (Deonte CHESTER, et al., 2019) is: 3.1%    Values used to calculate the score:      Age: 53 years      Sex: Female      Is Non- : No      Diabetic: No      Tobacco smoker: No      Systolic Blood Pressure: 144 mmHg      Is BP treated: No      HDL Cholesterol: 50 mg/dL      Total Cholesterol: 229 mg/dL      Mary Dixon MD

## 2023-12-29 ENCOUNTER — LAB (OUTPATIENT)
Dept: INTERNAL MEDICINE | Facility: CLINIC | Age: 53
End: 2023-12-29
Payer: COMMERCIAL

## 2023-12-29 DIAGNOSIS — I10 BENIGN ESSENTIAL HTN: Primary | ICD-10-CM

## 2023-12-29 LAB
ALBUMIN SERPL-MCNC: 4 G/DL (ref 3.5–5.2)
ALBUMIN/GLOB SERPL: 1.6 G/DL
ALP SERPL-CCNC: 71 U/L (ref 39–117)
ALT SERPL W P-5'-P-CCNC: 21 U/L (ref 1–33)
ANION GAP SERPL CALCULATED.3IONS-SCNC: 11.1 MMOL/L (ref 5–15)
AST SERPL-CCNC: 21 U/L (ref 1–32)
BILIRUB SERPL-MCNC: 0.3 MG/DL (ref 0–1.2)
BUN SERPL-MCNC: 13 MG/DL (ref 6–20)
BUN/CREAT SERPL: 16.7 (ref 7–25)
CALCIUM SPEC-SCNC: 9.1 MG/DL (ref 8.6–10.5)
CHLORIDE SERPL-SCNC: 104 MMOL/L (ref 98–107)
CHOLEST SERPL-MCNC: 252 MG/DL (ref 0–200)
CO2 SERPL-SCNC: 23.9 MMOL/L (ref 22–29)
CREAT SERPL-MCNC: 0.78 MG/DL (ref 0.57–1)
DEPRECATED RDW RBC AUTO: 39.9 FL (ref 37–54)
EGFRCR SERPLBLD CKD-EPI 2021: 89.8 ML/MIN/1.73
ERYTHROCYTE [DISTWIDTH] IN BLOOD BY AUTOMATED COUNT: 12.6 % (ref 12.3–15.4)
GLOBULIN UR ELPH-MCNC: 2.5 GM/DL
GLUCOSE SERPL-MCNC: 98 MG/DL (ref 65–99)
HCT VFR BLD AUTO: 40.6 % (ref 34–46.6)
HDLC SERPL-MCNC: 48 MG/DL (ref 40–60)
HGB BLD-MCNC: 13.8 G/DL (ref 12–15.9)
LDLC SERPL CALC-MCNC: 146 MG/DL (ref 0–100)
LDLC/HDLC SERPL: 2.93 {RATIO}
MCH RBC QN AUTO: 29.9 PG (ref 26.6–33)
MCHC RBC AUTO-ENTMCNC: 34 G/DL (ref 31.5–35.7)
MCV RBC AUTO: 88.1 FL (ref 79–97)
PLATELET # BLD AUTO: 348 10*3/MM3 (ref 140–450)
PMV BLD AUTO: 10.3 FL (ref 6–12)
POTASSIUM SERPL-SCNC: 4.4 MMOL/L (ref 3.5–5.2)
PROT SERPL-MCNC: 6.5 G/DL (ref 6–8.5)
RBC # BLD AUTO: 4.61 10*6/MM3 (ref 3.77–5.28)
SODIUM SERPL-SCNC: 139 MMOL/L (ref 136–145)
TRIGL SERPL-MCNC: 316 MG/DL (ref 0–150)
TSH SERPL DL<=0.05 MIU/L-ACNC: 2.88 UIU/ML (ref 0.27–4.2)
VIT B12 BLD-MCNC: 414 PG/ML (ref 211–946)
VLDLC SERPL-MCNC: 58 MG/DL (ref 5–40)
WBC NRBC COR # BLD AUTO: 6.64 10*3/MM3 (ref 3.4–10.8)

## 2023-12-29 PROCEDURE — 82607 VITAMIN B-12: CPT | Performed by: INTERNAL MEDICINE

## 2023-12-29 PROCEDURE — 80050 GENERAL HEALTH PANEL: CPT | Performed by: INTERNAL MEDICINE

## 2023-12-29 PROCEDURE — 80061 LIPID PANEL: CPT | Performed by: INTERNAL MEDICINE

## 2024-02-21 ENCOUNTER — OFFICE VISIT (OUTPATIENT)
Dept: BARIATRICS/WEIGHT MGMT | Facility: CLINIC | Age: 54
End: 2024-02-21
Payer: COMMERCIAL

## 2024-02-21 VITALS
HEIGHT: 63 IN | DIASTOLIC BLOOD PRESSURE: 86 MMHG | BODY MASS INDEX: 34.66 KG/M2 | WEIGHT: 195.6 LBS | HEART RATE: 78 BPM | SYSTOLIC BLOOD PRESSURE: 128 MMHG

## 2024-02-21 DIAGNOSIS — E66.9 OBESITY, CLASS I, BMI 30-34.9: Primary | ICD-10-CM

## 2024-02-21 DIAGNOSIS — Z51.81 THERAPEUTIC DRUG MONITORING: ICD-10-CM

## 2024-02-21 DIAGNOSIS — J45.20 MILD INTERMITTENT ASTHMA WITHOUT COMPLICATION: ICD-10-CM

## 2024-02-21 DIAGNOSIS — E55.9 HYPOVITAMINOSIS D: ICD-10-CM

## 2024-02-21 DIAGNOSIS — F41.9 ANXIETY AND DEPRESSION: ICD-10-CM

## 2024-02-21 DIAGNOSIS — M43.16 SPONDYLOLISTHESIS, LUMBAR REGION: ICD-10-CM

## 2024-02-21 DIAGNOSIS — R53.83 FATIGUE, UNSPECIFIED TYPE: ICD-10-CM

## 2024-02-21 DIAGNOSIS — K21.9 GASTROESOPHAGEAL REFLUX DISEASE WITHOUT ESOPHAGITIS: ICD-10-CM

## 2024-02-21 DIAGNOSIS — R29.818 SUSPECTED SLEEP APNEA: ICD-10-CM

## 2024-02-21 DIAGNOSIS — F32.A ANXIETY AND DEPRESSION: ICD-10-CM

## 2024-02-21 DIAGNOSIS — Z13.21 MALNUTRITION SCREEN: ICD-10-CM

## 2024-02-21 DIAGNOSIS — M19.90 ARTHRITIS: ICD-10-CM

## 2024-02-21 PROBLEM — E66.811 OBESITY, CLASS I, BMI 30-34.9: Status: ACTIVE | Noted: 2021-01-27

## 2024-02-21 NOTE — PROGRESS NOTES
[facility]  [address]  [place] [address]   Office Note      Date: 2024  Patient Name: Nita Mcmahon  MRN: [id number]  : 1970  Subjective  Subjective     Chief Complaint  Obesity Management consult, nutrition counseling          Nita Mcmahon presents to [facility] for obesity management.    Weight history:  Highest lifetime weight: 204 pounds.   Today's weight is       24  0906   Weight: 88.7 kg (195 lb 9.6 oz)     pounds.   Weight 1 year ago: 189  Weight 5 years ago: 180    The patient lives in [place] and works as a .    Previous diet: slim fast, nutrisystem, counting calories/fat  Previous Weight loss meds: remote hydroxycut    Current lifestyle:   The following seem to sabotage weight loss efforts:comfort/stress eating, enjoyment of food, specific cravings like carbohydrates, eating too fast, boredom eating, and portion sizes  The patient is motivated to lose weight for health and to increase longevity.  Her mother has had an MI, and father has had a few strokes.  Concerned about health problems like DM2 that run in her family.    Takes steroids a few times per year for asthma or rashes.  Has upcoming dermatology appt.  Reports postprandial epigastric pain after spicy, fatty, or fried foods.  Has been episodic and self-limited over the years.  HIDA in 2018 EF 52%, pt reports no reproduction of symptoms.      Exercise: has recently started walking.  Her goal is 5000 steps per day.    FITT Score Grid        Frequency   Intensity Time Strength Training   []   0 None  []   0 None  []   0 None  [x]   0 None    [x]   1 (1-2x/week) [x]   1 (light) []   1 (<10 min) []   1 (1x/week)   [x]   2 (3-5x/week) []   2 (moderate) []   2 (10-20 min) []   2 (2x/week)   []   3 (daily)   []   3 (moderately hard)  []   4 (very hard) []   3 (20-30 min)  [x]   4 (>30 min) []   3 (3-4x/week)               Past  24 hours Diet recall:     Breakfast: coffee (usually has powdered donuts also)  Snack: n/a  Lunch: sapna johns and baked chips  Snack: granola bar  Supper: chinese rice, orange chicken, egg roll (from Chinese takeout)  Snack: typically bowl of ice cream, powdered donuts    Beverages: coffee, water, Sprite (only had b/c it came with her lunch yesterday), Gatorade.  No alcohol.      Pancreatitis: no  Glaucoma: no  Headaches: frequent due to sinusitis  HTN: yes, currently no meds.  Heart palpitations: yes, especially when she has been on steroids.  Thyroid C cell cancer: no  MEN syndrome personal or family hx: no  Nephrolithiasis: no  +binge eating tendencies.    Review of Systems   Constitutional:  Positive for fatigue.        Positive for weight gain   HENT:  Negative for trouble swallowing.         Negative for throat swelling   Respiratory:  Negative for shortness of breath and wheezing.         Negative for snoring   Cardiovascular:  Positive for palpitations. Negative for chest pain and leg swelling.   Gastrointestinal:  Negative for abdominal pain, constipation, diarrhea, GERD and indigestion.   Endocrine: Positive for heat intolerance. Negative for cold intolerance, polydipsia, polyphagia and polyuria.        Negative for loss of hair  Negative for hirsutism     Genitourinary:         Denies menstrual irregularities   Musculoskeletal:  Negative for arthralgias.        Denies exercise limitations  Denies chronic pain   Skin:  Negative for dry skin.        Negative for acne   Neurological:  Negative for headache and memory problem.        Negative for paresthesias   Psychiatric/Behavioral:  Negative for self-injury, sleep disturbance, suicidal ideas and depressed mood. The patient is nervous/anxious.        PHQ-9 Total Score: 17     Past Medical History:   Diagnosis Date    Anxiety     Arthritis     Asthma     Diverticulosis     Elevated cholesterol     GERD (gastroesophageal reflux disease) 2/21/2024    History  of transfusion     NO REACTION    HTN (hypertension)     Muscle spasm of back     Suspected sleep apnea 2024     Past Surgical History:   Procedure Laterality Date    APPENDECTOMY OPEN       SECTION      x3, had uterine rupture    COLONOSCOPY      ENDOSCOPY      EXPLORATORY LAPAROTOMY      for adhesiolysis, also laparoscopyc    HERNIA REPAIR      open, umbilical, concomitant with hysterecctomy    HYSTERECTOMY      open, for fibroids    LUMBAR DISCECTOMY FUSION INSTRUMENTATION N/A 10/19/2021    Procedure: LUMBAR LAMINECTOMY POSTERIOR LUMBAR INTERBODY FUSION L4-5;  Surgeon: Primo Chavez MD;  Location: [facility];  Service: Neurosurgery;  Laterality: N/A;       Allergies   Allergen Reactions    Avelox  [Moxifloxacin Hcl] Anaphylaxis    Tramadol Mental Status Change     Patient notes very emotional    Latex Rash    Morphine Itching    Wound Dressing Adhesive Rash       Current Outpatient Medications:     albuterol sulfate HFA (ProAir HFA) 108 (90 Base) MCG/ACT inhaler, Inhale 1 puff 4 (Four) Times a Day. (Patient taking differently: Inhale 1 puff At Night As Needed.), Disp: 18 g, Rfl: 11    estradiol (ESTRACE) 1 MG tablet, Take 1 tablet by mouth Daily., Disp: 90 tablet, Rfl: 3    levocetirizine (XYZAL) 5 MG tablet, Take 1 tablet by mouth 2 (Two) Times a Day., Disp: , Rfl:     meloxicam (MOBIC) 15 MG tablet, Take 1 tablet by mouth Daily., Disp: 90 tablet, Rfl: 3    multivitamin with minerals tablet tablet, Take 1 tablet by mouth Daily., Disp: , Rfl:     venlafaxine XR (EFFEXOR-XR) 75 MG 24 hr capsule, Take 1 capsule by mouth Daily., Disp: 90 capsule, Rfl: 3    Social History     Socioeconomic History    Marital status:    Tobacco Use    Smoking status: Never     Passive exposure: Never    Smokeless tobacco: Never   Vaping Use    Vaping Use: Never used   Substance and Sexual Activity    Alcohol use: No    Drug use: No    Sexual activity: Defer     Partners: Male     Comment:   "    Social History     Social History Narrative    Not on file        Family History   Problem Relation Age of Onset    Depression Mother     Heart disease Mother     Obesity Mother     Arthritis Mother     Cancer Father     Hypertension Father     Heart disease Father     Arthritis Father     Diabetes Father     Cancer Sister     Depression Sister     Obesity Sister     Sleep apnea Sister     Arthritis Sister     Depression Brother     Obesity Brother     Depression Daughter     Heart disease Maternal Grandmother     Kidney disease Paternal Grandmother     Heart disease Paternal Grandfather     Arthritis Other     Diabetes Other     Hyperlipidemia Other     Obesity Other     Mental illness Other     Heart attack Other        Objective   Body mass index is 34.65 kg/m².  Body composition analysis completed and showed:   %body fat: 45.9    Measurements (in inches)  Waist: 43  Neck: 13.5  Chest: 44  Hips: 50.5  Thighs: 38    Vital Signs:   /86 (BP Location: Left arm, Patient Position: Sitting)   Pulse 78   Ht 160 cm (63\")   Wt 88.7 kg (195 lb 9.6 oz)   BMI 34.65 kg/m²     Physical Exam   General appears stated age and normal appearance   HEENT PERRLA, EOM intact, and conjunctivae normal   Chest/lungs Normal rate, Regular rhythm, Breathing is unlabored, and Clear to auscultation bilaterally   Abdomen Soft, normal bowel sounds, without mass or tenderness and low transverse C section incision, midline laparotomy and RLQ Khris-Reinier incision   Extremities without edema   Neuro Normal DTRs, Good historian, and No focal deficit   Skin Warm, dry, intact and rash on chest   Psych normal behavior, normal thought content, and normal concentration     Result Review :                   Weight Goals:    1st goal (10% weight loss): 175  2nd goal: 162  3rd goal (healthy fat range): 150  Personal goal is 140.    Assessment / Plan       Diagnoses and all orders for this visit:    1. Obesity, Class I, BMI 30-34.9 " (Primary)  Assessment & Plan:  Patient's (Body mass index is 34.65 kg/m².) indicates that they are obese (BMI >30) with health conditions that include hypertension and dyslipidemias . Weight is newly identified. BMI  is above average; BMI management plan is completed. We discussed portion control and increasing exercise.     Topics of discussion included obesity as a disease, nutritional education on food groups, exercise, and medications. Patient was instructed in adequate protein, controlled carb and controlled fat intake.   Patient received instructions on using the medicines as a tool in controlling their weight with nutritional and behavioral changes. Risks and benefits were discussed. I believe the potential benefits of medication helping to decrease weight outweighs the risks. Patient is to try nutritonal/behavioral changes only first.   Patient received our clinic education booklet.   Our patient consent form was reviewed including potential risks of weight loss. We also reviewed our confidentiality and HIPPA statements. Patients current FITT score was reviewed along with current capability for exercise tolerance and a patient will work towards a FITT score of:     Frequency   Intensity Time Strength Training   []   0 None  []   0 None  []   0 None  []   0 None    []   1 (1-2x/week) []   1 (light) []   1 (<10 min) []   1 (1x/week)   []   2 (3-5x/week) []   2 (moderate) []   2 (10-20 min) []   2 (2x/week)   []   3 (daily)   []   3 (moderately hard)  []   4 (very hard) []   3 (20-30 min)  []   4 (>30 min) []   3 (3-4x/week)       Patient's past medical history was reviewed in detail and barriers to weight loss were identified and discussed. Past efforts at weight reduction on their own as well as under physician supervision were documented and discussed.  I advised patient to continue routine care with their Primary Care Provider.     Nutritional recommendations and goals were reviewed including  Calories:9533-8750 daily adjusted for exercise calories burnt, Protein:100 g daily, Net carbs (total carb - fiber) of 50-75g per day.  Start to keep a food journal and bring into next visit in 2 weeks for review. Practice the behavioral modification technique of mindful eating. Take one MVI daily and 2000mg fish oil daily. Take other medications and supplements as directed.              Orders:  -     Urine Drug Screen - Urine, Clean Catch; Future  -     Insulin, Total; Future  -     CBC & Differential; Future  -     Comprehensive Metabolic Panel; Future  -     Hemoglobin A1c; Future  -     Vitamin D,25-Hydroxy; Future    2. Gastroesophageal reflux disease without esophagitis  -     Urine Drug Screen - Urine, Clean Catch; Future  -     Insulin, Total; Future  -     CBC & Differential; Future  -     Comprehensive Metabolic Panel; Future  -     Hemoglobin A1c; Future  -     Vitamin D,25-Hydroxy; Future    3. Anxiety and depression  -     Urine Drug Screen - Urine, Clean Catch; Future  -     Insulin, Total; Future  -     CBC & Differential; Future  -     Comprehensive Metabolic Panel; Future  -     Hemoglobin A1c; Future  -     Vitamin D,25-Hydroxy; Future    4. Spondylolisthesis, lumbar region    5. Arthritis    6. Suspected sleep apnea    7. Mild intermittent asthma without complication    8. Therapeutic drug monitoring  -     Urine Drug Screen - Urine, Clean Catch; Future  -     Insulin, Total; Future  -     CBC & Differential; Future  -     Comprehensive Metabolic Panel; Future  -     Hemoglobin A1c; Future  -     Vitamin D,25-Hydroxy; Future    9. Malnutrition screen  -     Urine Drug Screen - Urine, Clean Catch; Future  -     Insulin, Total; Future  -     CBC & Differential; Future  -     Comprehensive Metabolic Panel; Future  -     Hemoglobin A1c; Future  -     Vitamin D,25-Hydroxy; Future    10. Hypovitaminosis D  -     Urine Drug Screen - Urine, Clean Catch; Future  -     Insulin, Total; Future  -     CBC &  Differential; Future  -     Comprehensive Metabolic Panel; Future  -     Hemoglobin A1c; Future  -     Vitamin D,25-Hydroxy; Future    11. Fatigue, unspecified type  -     Urine Drug Screen - Urine, Clean Catch; Future  -     Insulin, Total; Future  -     CBC & Differential; Future  -     Comprehensive Metabolic Panel; Future  -     Hemoglobin A1c; Future  -     Vitamin D,25-Hydroxy; Future         I spent 60 minutes caring for Nita on this date of service. This time includes time spent by me in the following activities:preparing for the visit, reviewing tests, obtaining and/or reviewing a separately obtained history, performing a medically appropriate examination and/or evaluation , counseling and educating the patient/family/caregiver, ordering medications, tests, or procedures, documenting information in the medical record, and independently interpreting results and communicating that information with the patient/family/caregiver  Follow Up   Return in about 2 weeks (around 3/6/2024).  Patient was given instructions and counseling regarding her condition or for health maintenance advice. Please see specific information pulled into the AVS if appropriate.     Autumn Ya MD, FACS, FASMBS    02/21/2024

## 2024-02-21 NOTE — ASSESSMENT & PLAN NOTE
Patient's (Body mass index is 34.65 kg/m².) indicates that they are obese (BMI >30) with health conditions that include hypertension and dyslipidemias . Weight is newly identified. BMI  is above average; BMI management plan is completed. We discussed portion control and increasing exercise.     Topics of discussion included obesity as a disease, nutritional education on food groups, exercise, and medications. Patient was instructed in adequate protein, controlled carb and controlled fat intake.   Patient received instructions on using the medicines as a tool in controlling their weight with nutritional and behavioral changes. Risks and benefits were discussed. I believe the potential benefits of medication helping to decrease weight outweighs the risks. Patient is to try nutritonal/behavioral changes only first.   Patient received our clinic education booklet.   Our patient consent form was reviewed including potential risks of weight loss. We also reviewed our confidentiality and HIPPA statements. Patients current FITT score was reviewed along with current capability for exercise tolerance and a patient will work towards a FITT score of:     Frequency   Intensity Time Strength Training   []   0 None  []   0 None  []   0 None  []   0 None    []   1 (1-2x/week) []   1 (light) []   1 (<10 min) []   1 (1x/week)   []   2 (3-5x/week) []   2 (moderate) []   2 (10-20 min) []   2 (2x/week)   []   3 (daily)   []   3 (moderately hard)  []   4 (very hard) []   3 (20-30 min)  []   4 (>30 min) []   3 (3-4x/week)       Patient's past medical history was reviewed in detail and barriers to weight loss were identified and discussed. Past efforts at weight reduction on their own as well as under physician supervision were documented and discussed.  I advised patient to continue routine care with their Primary Care Provider.     Nutritional recommendations and goals were reviewed including Calories:[id number] daily adjusted for  exercise calories burnt, Protein:100 g daily, Net carbs (total carb - fiber) of 50-75g per day.  Start to keep a food journal and bring into next visit in 2 weeks for review. Practice the behavioral modification technique of mindful eating. Take one MVI daily and 2000mg fish oil daily. Take other medications and supplements as directed.

## 2024-02-22 LAB
25(OH)D3+25(OH)D2 SERPL-MCNC: 21.4 NG/ML (ref 30–100)
ALBUMIN SERPL-MCNC: 4.5 G/DL (ref 3.8–4.9)
ALBUMIN/GLOB SERPL: 1.8 {RATIO} (ref 1.2–2.2)
ALP SERPL-CCNC: 76 IU/L (ref 44–121)
ALT SERPL-CCNC: 21 IU/L (ref 0–32)
AMPHETAMINES UR QL SCN: NEGATIVE NG/ML
AST SERPL-CCNC: 22 IU/L (ref 0–40)
BARBITURATES UR QL SCN: NEGATIVE NG/ML
BASOPHILS # BLD AUTO: 0.1 X10E3/UL (ref 0–0.2)
BASOPHILS NFR BLD AUTO: 1 %
BENZODIAZ UR QL SCN: NEGATIVE NG/ML
BILIRUB SERPL-MCNC: 0.3 MG/DL (ref 0–1.2)
BUN SERPL-MCNC: 16 MG/DL (ref 6–24)
BUN/CREAT SERPL: 21 (ref 9–23)
BZE UR QL SCN: NEGATIVE NG/ML
CALCIUM SERPL-MCNC: 9.8 MG/DL (ref 8.7–10.2)
CANNABINOIDS UR QL SCN: NEGATIVE NG/ML
CHLORIDE SERPL-SCNC: 101 MMOL/L (ref 96–106)
CO2 SERPL-SCNC: 24 MMOL/L (ref 20–29)
CREAT SERPL-MCNC: 0.76 MG/DL (ref 0.57–1)
CREAT UR-MCNC: 206.8 MG/DL (ref 20–300)
EGFRCR SERPLBLD CKD-EPI 2021: 92 ML/MIN/1.73
EOSINOPHIL # BLD AUTO: 0.2 X10E3/UL (ref 0–0.4)
EOSINOPHIL NFR BLD AUTO: 2 %
ERYTHROCYTE [DISTWIDTH] IN BLOOD BY AUTOMATED COUNT: 12.5 % (ref 11.7–15.4)
GLOBULIN SER CALC-MCNC: 2.5 G/DL (ref 1.5–4.5)
GLUCOSE SERPL-MCNC: 97 MG/DL (ref 70–99)
HBA1C MFR BLD: 5.9 % (ref 4.8–5.6)
HCT VFR BLD AUTO: 43.8 % (ref 34–46.6)
HGB BLD-MCNC: 14.5 G/DL (ref 11.1–15.9)
IMM GRANULOCYTES # BLD AUTO: 0 X10E3/UL (ref 0–0.1)
IMM GRANULOCYTES NFR BLD AUTO: 0 %
INSULIN SERPL-ACNC: 9.8 UIU/ML (ref 2.6–24.9)
LABORATORY COMMENT REPORT: NORMAL
LYMPHOCYTES # BLD AUTO: 2.4 X10E3/UL (ref 0.7–3.1)
LYMPHOCYTES NFR BLD AUTO: 35 %
MCH RBC QN AUTO: 30 PG (ref 26.6–33)
MCHC RBC AUTO-ENTMCNC: 33.1 G/DL (ref 31.5–35.7)
MCV RBC AUTO: 91 FL (ref 79–97)
METHADONE UR QL SCN: NEGATIVE NG/ML
MONOCYTES # BLD AUTO: 0.5 X10E3/UL (ref 0.1–0.9)
MONOCYTES NFR BLD AUTO: 7 %
NEUTROPHILS # BLD AUTO: 3.8 X10E3/UL (ref 1.4–7)
NEUTROPHILS NFR BLD AUTO: 55 %
OPIATES UR QL SCN: NEGATIVE NG/ML
OXYCODONE+OXYMORPHONE UR QL SCN: NEGATIVE NG/ML
PCP UR QL: NEGATIVE NG/ML
PH UR: 6.3 [PH] (ref 4.5–8.9)
PLATELET # BLD AUTO: 395 X10E3/UL (ref 150–450)
POTASSIUM SERPL-SCNC: 5.1 MMOL/L (ref 3.5–5.2)
PROPOXYPH UR QL SCN: NEGATIVE NG/ML
PROT SERPL-MCNC: 7 G/DL (ref 6–8.5)
RBC # BLD AUTO: 4.83 X10E6/UL (ref 3.77–5.28)
SODIUM SERPL-SCNC: 140 MMOL/L (ref 134–144)
WBC # BLD AUTO: 6.9 X10E3/UL (ref 3.4–10.8)

## 2024-03-04 PROBLEM — E55.9 VITAMIN D DEFICIENCY: Status: ACTIVE | Noted: 2024-03-04

## 2024-03-04 PROBLEM — E88.819 INSULIN RESISTANCE: Status: ACTIVE | Noted: 2024-03-04

## 2024-03-04 RX ORDER — ERGOCALCIFEROL 1.25 MG/1
50000 CAPSULE ORAL
Qty: 12 CAPSULE | Refills: 0 | Status: SHIPPED | OUTPATIENT
Start: 2024-03-04 | End: 2024-05-21

## 2024-03-06 ENCOUNTER — OFFICE VISIT (OUTPATIENT)
Dept: BARIATRICS/WEIGHT MGMT | Facility: CLINIC | Age: 54
End: 2024-03-06
Payer: COMMERCIAL

## 2024-03-06 VITALS
DIASTOLIC BLOOD PRESSURE: 88 MMHG | SYSTOLIC BLOOD PRESSURE: 140 MMHG | BODY MASS INDEX: 33.91 KG/M2 | HEIGHT: 63 IN | WEIGHT: 191.4 LBS | HEART RATE: 85 BPM

## 2024-03-06 DIAGNOSIS — K21.9 GASTROESOPHAGEAL REFLUX DISEASE WITHOUT ESOPHAGITIS: ICD-10-CM

## 2024-03-06 DIAGNOSIS — R53.83 FATIGUE, UNSPECIFIED TYPE: Primary | ICD-10-CM

## 2024-03-06 DIAGNOSIS — K58.9 IRRITABLE BOWEL SYNDROME, UNSPECIFIED TYPE: ICD-10-CM

## 2024-03-06 DIAGNOSIS — E55.9 VITAMIN D DEFICIENCY: ICD-10-CM

## 2024-03-06 DIAGNOSIS — E88.819 INSULIN RESISTANCE: ICD-10-CM

## 2024-03-06 DIAGNOSIS — E66.9 OBESITY, CLASS I, BMI 30-34.9: ICD-10-CM

## 2024-03-06 RX ORDER — CYANOCOBALAMIN 1000 UG/ML
1000 INJECTION, SOLUTION INTRAMUSCULAR; SUBCUTANEOUS
Status: SHIPPED | OUTPATIENT
Start: 2024-03-06

## 2024-03-06 RX ORDER — NALTREXONE HYDROCHLORIDE 50 MG/1
TABLET, FILM COATED ORAL
Qty: 30 TABLET | Refills: 2 | Status: SHIPPED | OUTPATIENT
Start: 2024-03-06 | End: 2024-04-05

## 2024-03-06 RX ORDER — ONDANSETRON 4 MG/1
4 TABLET, ORALLY DISINTEGRATING ORAL EVERY 8 HOURS PRN
Qty: 30 TABLET | Refills: 0 | Status: SHIPPED | OUTPATIENT
Start: 2024-03-06

## 2024-03-06 RX ORDER — BUPROPION HYDROCHLORIDE 150 MG/1
TABLET, EXTENDED RELEASE ORAL
Qty: 60 TABLET | Refills: 0 | Status: SHIPPED | OUTPATIENT
Start: 2024-03-06 | End: 2024-06-04

## 2024-03-06 RX ADMIN — CYANOCOBALAMIN 1000 MCG: 1000 INJECTION, SOLUTION INTRAMUSCULAR; SUBCUTANEOUS at 09:00

## 2024-03-06 NOTE — ASSESSMENT & PLAN NOTE
Patient's (Body mass index is 33.9 kg/m².) indicates that they are obese (BMI >30) with health conditions that include hypertension . Weight is improving with lifestyle modifications. BMI  is above average; BMI management plan is completed. We discussed portion control and increasing exercise.     I have instructed the patient to continue with pursuit of medical weight loss as a part of this program. Patient does meet criteria for use of anorectics at this time as BMI >30 , body fat percentage >30%, hypertension, and this medication is indicated for LONG TERM use for management of obesity.     The current plan for this month includes:   - Adjust exercise as discussed  - Weight loss goal 4-6lbs this month  - Continue to work on lifestyle behavioral changes  - Continue nutrition focus  - Adjust macronutrients as discussed. Bring food journal to next visit for review  - Continue to prioritize protein, fiber, and hydration.     Thinks help with cravings most helpful.  Rx bupropion/naltrexone.

## 2024-03-06 NOTE — PROGRESS NOTES
[facility]  [address]  [place] [address]     Office Note      Date: 2024  Patient Name: Nita Mcmahon  MRN: [id number]  : 1970  Subjective  Subjective     Chief Complaint  Obesity Management follow-up          Nita Mcmahon presents to [facility] for obesity management.   Patient is satisfied with weight loss progress. Appetite is moderately controlled. Reports no side effects of prescribed medications today. The patient is taking multivitamin and is not taking fish oil.  The patient is using a food journal.        Realized she was getting too many carbs and not enough protein/water.  Stopped using freezer meal lunches.      Diet recall:    Breakfast: Atkins bar.  Compared it to a granola bar and was surprised at the unhealthiness of the granola bar.  And coffee.      Average calories per day: 1250  Average protein per day: 89  Average carbs per day: 75      The patient is exercising with a FITT score of:    Frequency Intensity Time Strength Training   []   0, none []   0 []   0 [x]   0   []   1 (1-2x/week) [x]   1 (light) []   1 (<10 min) []   1 (1x/week)   [x]   2 (3-5x/week) []   2 (moderate) []   2 (10-20 min) []   2 (2x/week)   []   3 (daily) []   3 (moderately hard)  []   4 (very hard) []   3 (20-30 min)  [x]   4 (>30 min) []   3 (3-4x/week)       Review of Systems   Constitutional:  Negative for appetite change and fatigue.   Eyes:  Negative for visual disturbance.   Cardiovascular:  Negative for chest pain and palpitations.   Gastrointestinal:  Negative for constipation and indigestion.   Neurological:  Negative for light-headedness.   All other systems reviewed and are negative.      Objective   Start weight: 195.6 pounds.    Today's Weight:       24  0847   Weight: 86.8 kg (191 lb 6.4 oz)     Total Loss lb/%Loss of beginning body weight (BBW): -4.2 lb/-2.15 %    Change in weight since last visit:  -4.2    Body mass index is 33.9 kg/m².   Body composition analysis completed and showed:   %body fat: 45.4    Measurements (in inches)  Waist: 42    Allergies   Allergen Reactions    Avelox  [Moxifloxacin Hcl] Anaphylaxis    Tramadol Mental Status Change     Patient notes very emotional    Latex Rash    Morphine Itching    Wound Dressing Adhesive Rash       Current Outpatient Medications:     albuterol sulfate HFA (ProAir HFA) 108 (90 Base) MCG/ACT inhaler, Inhale 1 puff 4 (Four) Times a Day. (Patient taking differently: Inhale 1 puff At Night As Needed.), Disp: 18 g, Rfl: 11    estradiol (ESTRACE) 1 MG tablet, Take 1 tablet by mouth Daily., Disp: 90 tablet, Rfl: 3    levocetirizine (XYZAL) 5 MG tablet, Take 1 tablet by mouth 2 (Two) Times a Day., Disp: , Rfl:     meloxicam (MOBIC) 15 MG tablet, Take 1 tablet by mouth Daily., Disp: 90 tablet, Rfl: 3    multivitamin with minerals tablet tablet, Take 1 tablet by mouth Daily., Disp: , Rfl:     venlafaxine XR (EFFEXOR-XR) 75 MG 24 hr capsule, Take 1 capsule by mouth Daily., Disp: 90 capsule, Rfl: 3    vitamin D (ERGOCALCIFEROL) 1.25 MG (35498 UT) capsule capsule, Take 1 capsule by mouth Every 7 (Seven) Days for 12 doses., Disp: 12 capsule, Rfl: 0    buPROPion SR (WELLBUTRIN SR) 150 MG 12 hr tablet, Take 1 tablet by mouth Daily for 7 days, THEN 1 tablet 2 (Two) Times a Day for 83 days., Disp: 60 tablet, Rfl: 0    naltrexone (DEPADE) 50 MG tablet, Take 0.5 tablets by mouth Daily for 7 days, THEN 0.5 tablets 2 (Two) Times a Day for 23 days. Take 1/2 tablet for 7 days or until nausea subsides then increase to 1/2 tab bid., Disp: 30 tablet, Rfl: 2    ondansetron ODT (ZOFRAN-ODT) 4 MG disintegrating tablet, Place 1 tablet on the tongue Every 8 (Eight) Hours As Needed for Nausea or Vomiting., Disp: 30 tablet, Rfl: 0    Current Facility-Administered Medications:     cyanocobalamin injection 1,000 mcg, 1,000 mcg, Intramuscular, Q28 Days, Autumn Ya MD, 1,000 mcg at  "24 0900    Past Medical History:   Diagnosis Date    Anxiety     Arthritis     Asthma     Diverticulosis     Elevated cholesterol     GERD (gastroesophageal reflux disease) 2024    History of transfusion     NO REACTION    HTN (hypertension)     Muscle spasm of back     Suspected sleep apnea 2024     Past Surgical History:   Procedure Laterality Date    APPENDECTOMY OPEN       SECTION      x3, had uterine rupture    COLONOSCOPY      ENDOSCOPY      EXPLORATORY LAPAROTOMY      for adhesiolysis, also laparoscopyc    HERNIA REPAIR      open, umbilical, concomitant with hysterecctomy    HYSTERECTOMY      open, for fibroids    LUMBAR DISCECTOMY FUSION INSTRUMENTATION N/A 10/19/2021    Procedure: LUMBAR LAMINECTOMY POSTERIOR LUMBAR INTERBODY FUSION L4-5;  Surgeon: Primo Chavez MD;  Location: [facility];  Service: Neurosurgery;  Laterality: N/A;       Vital Signs:   /88 (BP Location: Left arm, Patient Position: Sitting)   Pulse 85   Ht 160 cm (63\")   Wt 86.8 kg (191 lb 6.4 oz)   BMI 33.90 kg/m²     Physical Exam   General appears stated age and normal appearance   HEENT PERRLA, EOM intact, conjunctivae normal, and     Chest/lungs Normal rate, Regular rhythm, Breathing is unlabored, and Clear to auscultation bilaterally   Extremities without edema   Neuro Good historian and No focal deficit   Skin Warm, dry, intact   Psych normal behavior, normal thought content, and normal concentration     Result Review :   The following data was reviewed by: Autumn Ya MD on 2024:         \"  I have reviewed your labs.     Vitamin D was low.  I have sent a prescription for weekly replacement vitamin D to your pharmacy.  Please take weekly for the next three months.     You  have insulin resistance (frederick IR 2.3).     Hemoglobin A1C is in the prediabetes range.\"    Assessment / Plan          Diagnoses and all orders for this visit:    1. Fatigue, unspecified type " (Primary)  -     cyanocobalamin injection 1,000 mcg    2. Obesity, Class I, BMI 30-34.9  Assessment & Plan:  Patient's (Body mass index is 33.9 kg/m².) indicates that they are obese (BMI >30) with health conditions that include hypertension . Weight is improving with lifestyle modifications. BMI  is above average; BMI management plan is completed. We discussed portion control and increasing exercise.     I have instructed the patient to continue with pursuit of medical weight loss as a part of this program. Patient does meet criteria for use of anorectics at this time as BMI >30 , body fat percentage >30%, hypertension, and this medication is indicated for LONG TERM use for management of obesity.     The current plan for this month includes:   - Adjust exercise as discussed  - Weight loss goal 4-6lbs this month  - Continue to work on lifestyle behavioral changes  - Continue nutrition focus  - Adjust macronutrients as discussed. Bring food journal to next visit for review  - Continue to prioritize protein, fiber, and hydration.     Thinks help with cravings most helpful.  Rx bupropion/naltrexone.          3. Vitamin D deficiency    4. Insulin resistance    5. Irritable bowel syndrome, unspecified type    6. Gastroesophageal reflux disease without esophagitis    Other orders  -     naltrexone (DEPADE) 50 MG tablet; Take 0.5 tablets by mouth Daily for 7 days, THEN 0.5 tablets 2 (Two) Times a Day for 23 days. Take 1/2 tablet for 7 days or until nausea subsides then increase to 1/2 tab bid.  Dispense: 30 tablet; Refill: 2  -     buPROPion SR (WELLBUTRIN SR) 150 MG 12 hr tablet; Take 1 tablet by mouth Daily for 7 days, THEN 1 tablet 2 (Two) Times a Day for 83 days.  Dispense: 60 tablet; Refill: 0  -     ondansetron ODT (ZOFRAN-ODT) 4 MG disintegrating tablet; Place 1 tablet on the tongue Every 8 (Eight) Hours As Needed for Nausea or Vomiting.  Dispense: 30 tablet; Refill: 0        I spent 30 minutes caring for Nita on  this date of service. This time includes time spent by me in the following activities:preparing for the visit, reviewing tests, obtaining and/or reviewing a separately obtained history, performing a medically appropriate examination and/or evaluation , counseling and educating the patient/family/caregiver, ordering medications, tests, or procedures, documenting information in the medical record, and independently interpreting results and communicating that information with the patient/family/caregiver    Follow Up         Return in about 1 month (around 4/6/2024).  Patient was given instructions and counseling regarding her condition or for health maintenance advice. Please see specific information pulled into the AVS if appropriate.     Autumn Ya M.D., FACS, [facility]    03/06/2024

## 2024-04-03 RX ORDER — BUPROPION HYDROCHLORIDE 150 MG/1
TABLET, EXTENDED RELEASE ORAL
Qty: 60 TABLET | Refills: 0 | Status: SHIPPED | OUTPATIENT
Start: 2024-04-03

## 2024-04-10 ENCOUNTER — OFFICE VISIT (OUTPATIENT)
Dept: BARIATRICS/WEIGHT MGMT | Facility: CLINIC | Age: 54
End: 2024-04-10
Payer: COMMERCIAL

## 2024-04-10 VITALS
BODY MASS INDEX: 32.82 KG/M2 | WEIGHT: 185.2 LBS | HEART RATE: 98 BPM | OXYGEN SATURATION: 98 % | HEIGHT: 63 IN | DIASTOLIC BLOOD PRESSURE: 82 MMHG | SYSTOLIC BLOOD PRESSURE: 126 MMHG | RESPIRATION RATE: 16 BRPM

## 2024-04-10 DIAGNOSIS — J45.909 ASTHMA, UNSPECIFIED ASTHMA SEVERITY, UNSPECIFIED WHETHER COMPLICATED, UNSPECIFIED WHETHER PERSISTENT: ICD-10-CM

## 2024-04-10 DIAGNOSIS — F32.A ANXIETY AND DEPRESSION: ICD-10-CM

## 2024-04-10 DIAGNOSIS — E55.9 VITAMIN D DEFICIENCY: ICD-10-CM

## 2024-04-10 DIAGNOSIS — M19.90 ARTHRITIS: ICD-10-CM

## 2024-04-10 DIAGNOSIS — R29.818 SUSPECTED SLEEP APNEA: ICD-10-CM

## 2024-04-10 DIAGNOSIS — K21.9 GASTROESOPHAGEAL REFLUX DISEASE WITHOUT ESOPHAGITIS: ICD-10-CM

## 2024-04-10 DIAGNOSIS — E88.819 INSULIN RESISTANCE: ICD-10-CM

## 2024-04-10 DIAGNOSIS — K58.9 IRRITABLE BOWEL SYNDROME, UNSPECIFIED TYPE: ICD-10-CM

## 2024-04-10 DIAGNOSIS — E66.9 OBESITY, CLASS I, BMI 30-34.9: Primary | ICD-10-CM

## 2024-04-10 DIAGNOSIS — F41.9 ANXIETY AND DEPRESSION: ICD-10-CM

## 2024-04-10 PROCEDURE — 99214 OFFICE O/P EST MOD 30 MIN: CPT | Performed by: SURGERY

## 2024-04-10 PROCEDURE — 96372 THER/PROPH/DIAG INJ SC/IM: CPT | Performed by: SURGERY

## 2024-04-10 RX ORDER — NALTREXONE HYDROCHLORIDE 50 MG/1
25 TABLET, FILM COATED ORAL 2 TIMES DAILY
Qty: 60 TABLET | Refills: 2 | Status: SHIPPED | OUTPATIENT
Start: 2024-04-10 | End: 2024-10-07

## 2024-04-10 RX ADMIN — CYANOCOBALAMIN 1000 MCG: 1000 INJECTION, SOLUTION INTRAMUSCULAR; SUBCUTANEOUS at 09:36

## 2024-04-10 NOTE — PROGRESS NOTES
[facility]  [address]  [place] [address]     Office Note      Date: 04/10/2024  Patient Name: Nita Mcmahon  MRN: [id number]  : 1970  Subjective  Subjective     Chief Complaint  Obesity Management follow-up          Nita Mcmahon presents to [facility] for obesity management.   Patient is satisfied with weight loss progress. Appetite is well controlled. Reports no side effects of prescribed medications today. The patient is taking multivitamin and is not taking fish oil.  The patient is using a food journal.        Has walked less d/t rainy weather.  Thinks she ate too many carbs.     Diet recall:    Average calories per day: 993  Average protein per day: 43  Average carbs per day: 103      The patient is exercising with a FITT score of:    Frequency Intensity Time Strength Training   []   0, none []   0 []   0 [x]   0   [x]   1 (1-2x/week) [x]   1 (light) []   1 (<10 min) []   1 (1x/week)   []   2 (3-5x/week) []   2 (moderate) []   2 (10-20 min) []   2 (2x/week)   []   3 (daily) []   3 (moderately hard)  []   4 (very hard) [x]   3 (20-30 min)  []   4 (>30 min) []   3 (3-4x/week)       Review of Systems   Constitutional:  Negative for appetite change and fatigue.   Eyes:  Negative for visual disturbance.   Cardiovascular:  Negative for chest pain and palpitations.   Gastrointestinal:  Negative for constipation and indigestion.   Neurological:  Negative for light-headedness.   All other systems reviewed and are negative.      Objective   Start weight: 195.6 pounds.    Today's Weight:       04/10/24  0927   Weight: 84 kg (185 lb 3.2 oz)     Total Loss lb/%Loss of beginning body weight (BBW): -10.4 lb/-5.32%    Change in weight since last visit: -6.2    Body mass index is 32.81 kg/m².   Body composition analysis completed and showed:   %body fat: 44.3    Measurements (in inches)  Waist: 42    Allergies   Allergen Reactions     Avelox  [Moxifloxacin Hcl] Anaphylaxis    Tramadol Mental Status Change     Patient notes very emotional    Latex Rash    Morphine Itching    Wound Dressing Adhesive Rash       Current Outpatient Medications:     buPROPion SR (WELLBUTRIN SR) 150 MG 12 hr tablet, TAKE 1 TABLET BY MOUTH ONCE DAILY FOR 7 DAYS, THEN 1 TABLET BY MOUTH TWICE DAILY, Disp: 60 tablet, Rfl: 0    estradiol (ESTRACE) 1 MG tablet, Take 1 tablet by mouth Daily., Disp: 90 tablet, Rfl: 3    levocetirizine (XYZAL) 5 MG tablet, Take 1 tablet by mouth 2 (Two) Times a Day., Disp: , Rfl:     meloxicam (MOBIC) 15 MG tablet, Take 1 tablet by mouth Daily., Disp: 90 tablet, Rfl: 3    multivitamin with minerals tablet tablet, Take 1 tablet by mouth Daily., Disp: , Rfl:     venlafaxine XR (EFFEXOR-XR) 75 MG 24 hr capsule, Take 1 capsule by mouth Daily., Disp: 90 capsule, Rfl: 3    vitamin D (ERGOCALCIFEROL) 1.25 MG (68683 UT) capsule capsule, Take 1 capsule by mouth Every 7 (Seven) Days for 12 doses., Disp: 12 capsule, Rfl: 0    naltrexone (DEPADE) 50 MG tablet, Take 0.5 tablets by mouth 2 (Two) Times a Day for 180 days. Take 1/2 tablet for 7 days or until nausea subsides then increase to 1/2 tab bid., Disp: 60 tablet, Rfl: 2    Current Facility-Administered Medications:     cyanocobalamin injection 1,000 mcg, 1,000 mcg, Intramuscular, Q28 Days, Autumn Ya MD, 1,000 mcg at 04/10/24 0936    Past Medical History:   Diagnosis Date    Anxiety     Arthritis     Asthma     Diverticulosis     Elevated cholesterol     GERD (gastroesophageal reflux disease) 2024    History of transfusion     NO REACTION    HTN (hypertension)     Muscle spasm of back     Suspected sleep apnea 2024     Past Surgical History:   Procedure Laterality Date    APPENDECTOMY OPEN       SECTION      x3, had uterine rupture    COLONOSCOPY      ENDOSCOPY      EXPLORATORY LAPAROTOMY      for adhesiolysis, also laparoscopyc    HERNIA REPAIR      open,  "umbilical, concomitant with hysterecctomy    HYSTERECTOMY      open, for fibroids    LUMBAR DISCECTOMY FUSION INSTRUMENTATION N/A 10/19/2021    Procedure: LUMBAR LAMINECTOMY POSTERIOR LUMBAR INTERBODY FUSION L4-5;  Surgeon: Primo Chavez MD;  Location[facility];  Service: Neurosurgery;  Laterality: N/A;       Vital Signs:   /82 (BP Location: Left arm, Patient Position: Sitting)   Pulse 98   Resp 16   Ht 160 cm (63\")   Wt 84 kg (185 lb 3.2 oz)   SpO2 98%   BMI 32.81 kg/m²     Physical Exam   General appears stated age and normal appearance   HEENT PERRLA, EOM intact, conjunctivae normal, and     Chest/lungs Normal rate, Regular rhythm, Breathing is unlabored, and Clear to auscultation bilaterally   Extremities without edema   Neuro Good historian and No focal deficit   Skin Warm, dry, intact   Psych normal behavior, normal thought content, and normal concentration     Result Review :                Assessment / Plan          Diagnoses and all orders for this visit:    1. Obesity, Class I, BMI 30-34.9 (Primary)  Assessment & Plan:  Patient's (Body mass index is 32.81 kg/m².) indicates that they are obese (BMI >30) with health conditions that include hypertension . Weight is improving with lifestyle modifications. BMI  is above average; BMI management plan is completed. We discussed portion control and increasing exercise.     I have instructed the patient to continue with pursuit of medical weight loss as a part of this program. Patient does meet criteria for use of anorectics at this time as BMI >30 , body fat percentage >30%, hypertension, is not at treatment goal, and this medication is indicated for LONG TERM use for management of obesity.     The current plan for this month includes:   - Adjust exercise as discussed  - Weight loss goal 4-6lbs this month  - Continue to work on lifestyle behavioral changes  - Continue nutrition focus  - Adjust macronutrients as discussed. Bring food journal " to next visit for review  - Continue to prioritize protein, fiber, and hydration.       Continue naltrexone/bupropion.    Doing well, encouraged to increase protein and decrease carbs.  Resume walking.      2. Vitamin D deficiency    3. Insulin resistance    4. Irritable bowel syndrome, unspecified type    5. Gastroesophageal reflux disease without esophagitis    6. Anxiety and depression    7. Arthritis    8. Suspected sleep apnea    9. Asthma, unspecified asthma severity, unspecified whether complicated, unspecified whether persistent    Other orders  -     naltrexone (DEPADE) 50 MG tablet; Take 0.5 tablets by mouth 2 (Two) Times a Day for 180 days. Take 1/2 tablet for 7 days or until nausea subsides then increase to 1/2 tab bid.  Dispense: 60 tablet; Refill: 2        I spent 30 minutes caring for Nita on this date of service. This time includes time spent by me in the following activities:preparing for the visit, reviewing tests, obtaining and/or reviewing a separately obtained history, performing a medically appropriate examination and/or evaluation , counseling and educating the patient/family/caregiver, ordering medications, tests, or procedures, documenting information in the medical record, and independently interpreting results and communicating that information with the patient/family/caregiver    Follow Up         Return in about 1 month (around 5/10/2024).  Patient was given instructions and counseling regarding her condition or for health maintenance advice. Please see specific information pulled into the AVS if appropriate.     Autumn Ya M.D., [facility], [facility]    04/10/2024

## 2024-04-10 NOTE — ASSESSMENT & PLAN NOTE
Patient's (Body mass index is 32.81 kg/m².) indicates that they are obese (BMI >30) with health conditions that include hypertension . Weight is improving with lifestyle modifications. BMI  is above average; BMI management plan is completed. We discussed portion control and increasing exercise.     I have instructed the patient to continue with pursuit of medical weight loss as a part of this program. Patient does meet criteria for use of anorectics at this time as BMI >30 , body fat percentage >30%, hypertension, is not at treatment goal, and this medication is indicated for LONG TERM use for management of obesity.     The current plan for this month includes:   - Adjust exercise as discussed  - Weight loss goal 4-6lbs this month  - Continue to work on lifestyle behavioral changes  - Continue nutrition focus  - Adjust macronutrients as discussed. Bring food journal to next visit for review  - Continue to prioritize protein, fiber, and hydration.       Continue naltrexone/bupropion.    Doing well, encouraged to increase protein and decrease carbs.  Resume walking.

## 2024-05-29 NOTE — PROGRESS NOTES
[facility]  [address]  [place] [address]     Office Note      Date: 2024  Patient Name: Nita Mcmahon  MRN: [id number]  : 1970    Subjective     Chief Complaint  Obesity Management follow-up          Nita Mcmahon presents to [facility] for obesity management.   Patient is satisfied with weight loss progress. Appetite is well controlled.She is taking Wellbutrin and Naltrexone. Reports no side effects of prescribed medications today. The patient is taking multivitamin and is not taking fish oil.  The patient is using a food journal. She is discouraged about some food choices she had made recently with sweets however does a good job of logging, meal prepping. Review of food journal shows deficit in protein and fiber and excess calories coming from carbohydrates. She has noticed some non scale victories, has had to go shopping for smaller clothes recently.     Diet recall:    Breakfast: 2 cups coffee (cream and stevia), atkins bar  Snack: none  Lunch: 1 boiled egg, propel water, 6 grape tomatoes, 1 cup snow peas, 1 piece swiss cheese and low carb tortilla wrap  Snack: 1 oz pretzels  Dinner: maurizio's taco salad  Snack: 1 cup cantaloupe     Beverages: propel    The patient is exercising with a FITT score of:    Frequency Intensity Time Strength Training   []   0, none []   0 []   0 [x]   0   [x]   1 (1-2x/week) [x]   1 (light) []   1 (<10 min) []   1 (1x/week)   []   2 (3-5x/week) []   2 (moderate) []   2 (10-20 min) []   2 (2x/week)   []   3 (daily) []   3 (moderately hard)  []   4 (very hard) [x]   3 (20-30 min)  []   4 (>30 min) []   3 (3-4x/week)       Review of Systems   Constitutional:  Negative for appetite change and fatigue.   Eyes:  Negative for visual disturbance.   Cardiovascular:  Negative for chest pain and palpitations.   Gastrointestinal:  Negative for constipation and indigestion.   Neurological:   "Negative for light-headedness.   All other systems reviewed and are negative.        Current Outpatient Medications:     buPROPion SR (WELLBUTRIN SR) 150 MG 12 hr tablet, Take 1 tablet by mouth 2 (Two) Times a Day., Disp: 60 tablet, Rfl: 1    estradiol (ESTRACE) 1 MG tablet, Take 1 tablet by mouth Daily., Disp: 90 tablet, Rfl: 3    levocetirizine (XYZAL) 5 MG tablet, Take 1 tablet by mouth 2 (Two) Times a Day., Disp: , Rfl:     multivitamin with minerals tablet tablet, Take 1 tablet by mouth Daily., Disp: , Rfl:     naltrexone (DEPADE) 50 MG tablet, Take 0.5 tablets by mouth 2 (Two) Times a Day for 180 days. Take 1/2 tablet for 7 days or until nausea subsides then increase to 1/2 tab bid., Disp: 60 tablet, Rfl: 2    venlafaxine XR (EFFEXOR-XR) 75 MG 24 hr capsule, Take 1 capsule by mouth Daily., Disp: 90 capsule, Rfl: 3    Current Facility-Administered Medications:     cyanocobalamin injection 1,000 mcg, 1,000 mcg, Intramuscular, Q28 Days, Autumn Ya MD, 1,000 mcg at 04/10/24 0936    Objective   Start weight: 195.6 pounds.    Total Loss lb/%Loss of beginning body weight (BBW): -15.8lb/-8.08%  Change in weight since last visit: -5.4    Recent Weight History:   Wt Readings from Last 6 Encounters:   05/30/24 81.6 kg (179 lb 12.8 oz)   04/10/24 84 kg (185 lb 3.2 oz)   03/06/24 86.8 kg (191 lb 6.4 oz)   02/21/24 88.7 kg (195 lb 9.6 oz)   12/05/23 89.8 kg (198 lb)   06/05/23 87.5 kg (193 lb)       Body mass index is 31.85 kg/m².   Body composition analysis completed and showed:   Body Fat %: 43.4    Measurements (in inches)  Waist Circumference: 40      Vital Signs:   /78 (BP Location: Left arm, Patient Position: Sitting)   Pulse 68   Ht 160 cm (63\")   Wt 81.6 kg (179 lb 12.8 oz)   BMI 31.85 kg/m²     Physical Exam   General appears stated age and normal appearance   HEENT PERRLA, EOM intact, and conjunctivae normal   Chest/lungs Normal rate, Regular rhythm, Breathing is unlabored, and Clear to " auscultation bilaterally   Extremities without edema   Neuro Good historian and No focal deficit   Skin Warm, dry, intact   Psych normal behavior, normal thought content, and normal concentration       Common labs          12/29/2023    08:36 2/21/2024    11:12   Common Labs   Glucose 98  97    BUN 13  16    Creatinine 0.78  0.76    Sodium 139  140    Potassium 4.4  5.1    Chloride 104  101    Calcium 9.1  9.8    Total Protein  7.0    Albumin 4.0  4.5    Total Bilirubin 0.3  0.3    Alkaline Phosphatase 71  76    AST (SGOT) 21  22    ALT (SGPT) 21  21    WBC 6.64  6.9    Hemoglobin 13.8  14.5    Hematocrit 40.6  43.8    Platelets 348  395    Total Cholesterol 252     Triglycerides 316     HDL Cholesterol 48     LDL Cholesterol  146     Hemoglobin A1C  5.9      TSH          12/29/2023    08:36   TSH   TSH 2.880               Assessment / Plan        Diagnoses and all orders for this visit:    1. Obesity, Class I, BMI 30-34.9 (Primary)  Overview:  Start weight: (195): 1st goal (10% weight loss): 176 lb  Target Goal: 136-195 lb    +binge eating tendencies.    Current medication prescribed by [facility]: Wellbutrin, Naltrexone  Rx Options: GLP not covered unless DM2  Rx Caution: P37 (palpitations, HTN)    Comorbid conditions: HLD, GERD, Insulin resistance, Vit D defi, anx/depression  Food:   Exercise:     Pancreatitis: no  Glaucoma: no  Headaches: frequent due to sinusitis  HTN: yes, currently no meds.  Heart palpitations: yes, especially when she has been on steroids.  Thyroid C cell cancer: no  MEN syndrome personal or family hx: no  Nephrolithiasis: no      Assessment & Plan:  Patient's (Body mass index is 31.85 kg/m².) indicates that they are obese (BMI >30) with health conditions that include GERD . Weight is improving with treatment. BMI  is above average; BMI management plan is completed. We discussed portion control, increasing exercise, pharmacologic options including Wellbutrin and Naltrexone, and an teofilo-based  approach such as MyFitness Pal or Lose It.     I have instructed the patient to continue with pursuit of medical weight loss as a part of this program. Patient does meet criteria for use of anorectics at this time as BMI >30 , body fat percentage >30%, hyperlipidemia, and impaired fasting glucose.     The current plan for this month includes:   - F/u visit and she is down 5.4 lbs since last visit 1 month ago  - Continue Wellbutrin 150 mg  - Continue Naltrexone 50 mg  - Labs next visit fasting  - Incorporate 4 days of exercise (15 minutes per session/strength training)  - B12 today for fatigue  - Adjust macronutrients as discussed, increase protein and fiber and work on decreasing carbohydrates. Bring food journal to next visit for review  - Treatment goal 136-195 lb    Overall, great work this month! You have some great non scale victories and have met the goal of 4-6 lbs. Keep up the good work.       Orders:  -     buPROPion SR (WELLBUTRIN SR) 150 MG 12 hr tablet; Take 1 tablet by mouth 2 (Two) Times a Day.  Dispense: 60 tablet; Refill: 1    2. Insulin resistance  Overview:  frederick IR 2.3    Assessment & Plan:  Low carb diet, regular physical activity, and weight reduction of 10-15%.         3. Vitamin D deficiency  Overview:  Replaced 03/04/24  Recheck 6/2024    Assessment & Plan:  She completed supplement. Will recheck level at next visit June.       4. Anxiety and depression    5. Suspected sleep apnea          Follow Up   Return in about 1 month (around 6/30/2024).  Patient was given instructions and counseling regarding her condition or for health maintenance advice. Please see specific information pulled into the AVS if appropriate.     Corin Ray, APRN  05/30/2024

## 2024-05-30 ENCOUNTER — OFFICE VISIT (OUTPATIENT)
Dept: BARIATRICS/WEIGHT MGMT | Facility: CLINIC | Age: 54
End: 2024-05-30
Payer: COMMERCIAL

## 2024-05-30 VITALS
DIASTOLIC BLOOD PRESSURE: 78 MMHG | BODY MASS INDEX: 31.86 KG/M2 | HEIGHT: 63 IN | SYSTOLIC BLOOD PRESSURE: 122 MMHG | WEIGHT: 179.8 LBS | HEART RATE: 68 BPM

## 2024-05-30 DIAGNOSIS — E55.9 VITAMIN D DEFICIENCY: ICD-10-CM

## 2024-05-30 DIAGNOSIS — F41.9 ANXIETY AND DEPRESSION: ICD-10-CM

## 2024-05-30 DIAGNOSIS — F32.A ANXIETY AND DEPRESSION: ICD-10-CM

## 2024-05-30 DIAGNOSIS — R29.818 SUSPECTED SLEEP APNEA: ICD-10-CM

## 2024-05-30 DIAGNOSIS — E66.9 OBESITY, CLASS I, BMI 30-34.9: Primary | ICD-10-CM

## 2024-05-30 DIAGNOSIS — E88.819 INSULIN RESISTANCE: ICD-10-CM

## 2024-05-30 PROCEDURE — 99214 OFFICE O/P EST MOD 30 MIN: CPT

## 2024-05-30 RX ORDER — BUPROPION HYDROCHLORIDE 150 MG/1
150 TABLET, EXTENDED RELEASE ORAL 2 TIMES DAILY
Qty: 60 TABLET | Refills: 1 | Status: SHIPPED | OUTPATIENT
Start: 2024-05-30

## 2024-05-30 RX ADMIN — CYANOCOBALAMIN 1000 MCG: 1000 INJECTION, SOLUTION INTRAMUSCULAR; SUBCUTANEOUS at 09:48

## 2024-05-30 NOTE — ASSESSMENT & PLAN NOTE
Patient's (Body mass index is 31.85 kg/m².) indicates that they are obese (BMI >30) with health conditions that include GERD . Weight is improving with treatment. BMI  is above average; BMI management plan is completed. We discussed portion control, increasing exercise, pharmacologic options including Wellbutrin and Naltrexone, and an teofilo-based approach such as AutomateIt Pal or Lose It.     I have instructed the patient to continue with pursuit of medical weight loss as a part of this program. Patient does meet criteria for use of anorectics at this time as BMI >30 , body fat percentage >30%, hyperlipidemia, and impaired fasting glucose.     The current plan for this month includes:   - F/u visit and she is down 5.4 lbs since last visit 1 month ago  - Continue Wellbutrin 150 mg  - Continue Naltrexone 50 mg  - Labs next visit fasting  - Incorporate 4 days of exercise (15 minutes per session/strength training)  - B12 today for fatigue  - Adjust macronutrients as discussed, increase protein and fiber and work on decreasing carbohydrates. Bring food journal to next visit for review  - Treatment goal 136-195 lb    Overall, great work this month! You have some great non scale victories and have met the goal of 4-6 lbs. Keep up the good work.

## 2024-08-29 ENCOUNTER — OFFICE VISIT (OUTPATIENT)
Dept: BARIATRICS/WEIGHT MGMT | Facility: CLINIC | Age: 54
End: 2024-08-29
Payer: COMMERCIAL

## 2024-08-29 VITALS
BODY MASS INDEX: 30.94 KG/M2 | WEIGHT: 174.6 LBS | SYSTOLIC BLOOD PRESSURE: 124 MMHG | HEART RATE: 83 BPM | DIASTOLIC BLOOD PRESSURE: 72 MMHG | HEIGHT: 63 IN

## 2024-08-29 DIAGNOSIS — E66.9 OBESITY, CLASS I, BMI 30-34.9: Primary | ICD-10-CM

## 2024-08-29 DIAGNOSIS — E78.5 HYPERLIPIDEMIA, UNSPECIFIED HYPERLIPIDEMIA TYPE: ICD-10-CM

## 2024-08-29 DIAGNOSIS — E55.9 VITAMIN D DEFICIENCY: ICD-10-CM

## 2024-08-29 DIAGNOSIS — E88.819 INSULIN RESISTANCE: ICD-10-CM

## 2024-08-29 PROCEDURE — 99214 OFFICE O/P EST MOD 30 MIN: CPT

## 2024-08-29 RX ORDER — NALTREXONE HYDROCHLORIDE 50 MG/1
TABLET, FILM COATED ORAL
Qty: 30 TABLET | Refills: 2 | Status: SHIPPED | OUTPATIENT
Start: 2024-08-29

## 2024-08-29 RX ORDER — BUPROPION HYDROCHLORIDE 150 MG/1
150 TABLET, EXTENDED RELEASE ORAL 2 TIMES DAILY
Qty: 60 TABLET | Refills: 2 | Status: SHIPPED | OUTPATIENT
Start: 2024-08-29

## 2024-08-29 RX ORDER — NALTREXONE HYDROCHLORIDE 50 MG/1
50 TABLET, FILM COATED ORAL DAILY
COMMUNITY
End: 2024-08-29

## 2024-08-29 RX ADMIN — CYANOCOBALAMIN 1000 MCG: 1000 INJECTION, SOLUTION INTRAMUSCULAR; SUBCUTANEOUS at 13:38

## 2024-08-29 NOTE — ASSESSMENT & PLAN NOTE
Patient's (Body mass index is 30.93 kg/m².) indicates that they are obese (BMI >30) with health conditions that include impaired fasting glucose, dyslipidemias, and GERD . Weight is improving with lifestyle modifications. BMI  is above average; BMI management plan is completed. We discussed portion control, increasing exercise, pharmacologic options including wellbutrin and naltrexone, and an teofilo-based approach such as [employer] Pal or Lose It.     I have instructed the patient to continue with pursuit of medical weight loss as a part of this program. Patient does meet criteria for use of anorectics at this time as BMI >30 , body fat percentage >30%, hyperlipidemia, impaired fasting glucose, and is not at treatment goal.     The current plan for this month includes:   - 3 month f/u visit and she is down about 5 lbs, had been taking well/nalt  - Adjust exercise as discussed  - Weight loss goal 4-6lbs this month  - Adjust macronutrients as discussed, work towards increasing protein and fiber. Bring food journal to next visit for review  - Continue Wellbutrin and Naltrexone for weight loss, refills sent  - Goal to walk daily until next visit, 20-30 minutes  - Avoid carbohydrates at dinner and later  - Treatment goal 195 lb

## 2024-08-29 NOTE — PROGRESS NOTES
[facility]  [address]  [place] [address]     Office Note      Date: 2024  Patient Name: Nita Mcmahon  MRN: [id number]  : 1970    Subjective     Chief Complaint  Obesity Management follow-up          Nita Mcmahon presents to [facility] for obesity management.   Patient is satisfied with weight loss progress. Appetite is well controlled. On Wellbutrin and Naltrexone. Reports no side effects of prescribed medications today. The patient is taking multivitamin and is not taking fish oil.  The patient is using a food journal.  Binge has improved.     Diet recall:    Breakfast: coffee, sugar (setvia) and creamer, nutrigrain bar  Snack: none  Lunch: 2 pc cheese, boiled egg and low carb tortilla wrap, greek yogurt, 1/4 cup blueberries  Snack: none  Dinner: shrimp pasta, 4 jumbo boiled shrimp, pasta shells with cream sauce, carrots and peas, singleton/ranch  Snack: bam food cake    Beverages: water, has cut out lots of sugar    The patient is exercising with a FITT score of:    Frequency Intensity Time Strength Training   []   0, none []   0 []   0 []   0   [x]   1 (1-2x/week) [x]   1 (light) []   1 (<10 min) [x]   1 (1x/week)   []   2 (3-5x/week) []   2 (moderate) [x]   2 (10-20 min) []   2 (2x/week)   []   3 (daily) []   3 (moderately hard)  []   4 (very hard) []   3 (20-30 min)  []   4 (>30 min) []   3 (3-4x/week)       Review of Systems   Constitutional:  Negative for appetite change and fatigue.   Eyes:  Negative for visual disturbance.   Cardiovascular:  Negative for chest pain and palpitations.   Gastrointestinal:  Negative for constipation and indigestion.   Neurological:  Negative for light-headedness.         Current Outpatient Medications:     estradiol (ESTRACE) 1 MG tablet, Take 1 tablet by mouth Daily., Disp: 90 tablet, Rfl: 3    levocetirizine (XYZAL) 5 MG tablet, Take 1 tablet by mouth Every Evening., Disp: ,  "Rfl:     multivitamin with minerals tablet tablet, Take 1 tablet by mouth Daily., Disp: , Rfl:     venlafaxine XR (EFFEXOR-XR) 75 MG 24 hr capsule, Take 1 capsule by mouth Daily., Disp: 90 capsule, Rfl: 3    buPROPion SR (WELLBUTRIN SR) 150 MG 12 hr tablet, Take 1 tablet by mouth 2 (Two) Times a Day., Disp: 60 tablet, Rfl: 2    naltrexone (DEPADE) 50 MG tablet, Take 1/2 tablet by mouth for 3 days or until nausea subsides then increase to 1/2 tablet by mouth twice a day., Disp: 30 tablet, Rfl: 2    Current Facility-Administered Medications:     cyanocobalamin injection 1,000 mcg, 1,000 mcg, Intramuscular, Q28 Days, Autumn Ya MD, 1,000 mcg at 05/30/24 0948    Objective   Start weight: 195 pounds.    Total Loss lb/%Loss of beginning body weight (BBW): -20.4lb/-10.46%  Change in weight since last visit: -5.4    Recent Weight History:   Wt Readings from Last 6 Encounters:   08/29/24 79.2 kg (174 lb 9.6 oz)   05/30/24 81.6 kg (179 lb 12.8 oz)   04/10/24 84 kg (185 lb 3.2 oz)   03/06/24 86.8 kg (191 lb 6.4 oz)   02/21/24 88.7 kg (195 lb 9.6 oz)   12/05/23 89.8 kg (198 lb)       Body mass index is 30.93 kg/m².   Body composition analysis completed and showed:   Body Fat %: 40.5    Measurements (in inches)  Waist Circumference: 41      Vital Signs:   /72 (BP Location: Right arm, Patient Position: Sitting)   Pulse 83   Ht 160 cm (63\")   Wt 79.2 kg (174 lb 9.6 oz)   BMI 30.93 kg/m²     Physical Exam   General appears stated age and normal appearance   HEENT conjunctivae normal   Chest/lungs Normal rate, Regular rhythm, and Breathing is unlabored   Extremities without edema   Neuro Good historian and No focal deficit   Skin Warm, dry, intact   Psych normal behavior, normal thought content, and normal concentration       Common labs          12/29/2023    08:36 2/21/2024    11:12   Common Labs   Glucose 98  97    BUN 13  16    Creatinine 0.78  0.76    Sodium 139  140    Potassium 4.4  5.1    Chloride 104  " 101    Calcium 9.1  9.8    Total Protein  7.0    Albumin 4.0  4.5    Total Bilirubin 0.3  0.3    Alkaline Phosphatase 71  76    AST (SGOT) 21  22    ALT (SGPT) 21  21    WBC 6.64  6.9    Hemoglobin 13.8  14.5    Hematocrit 40.6  43.8    Platelets 348  395    Total Cholesterol 252     Triglycerides 316     HDL Cholesterol 48     LDL Cholesterol  146     Hemoglobin A1C  5.9      TSH          12/29/2023    08:36   TSH   TSH 2.880               Assessment / Plan        Diagnoses and all orders for this visit:    1. Obesity, Class I, BMI 30-34.9 (Primary)  Overview:  Start weight: (195): 1st goal (10% weight loss): 176 lb  Target Goal: 136-195 lb    +binge eating tendencies.    Current medication prescribed by [facility]: Wellbutrin, Naltrexone  Rx Options: GLP not covered unless DM2  Rx Caution: P37 (palpitations, HTN)    Comorbid conditions: HLD, GERD, Insulin resistance, Vit D defi, anx/depression  Food:   Exercise:     Pancreatitis: no  Glaucoma: no  Headaches: frequent due to sinusitis  HTN: yes, currently no meds.  Heart palpitations: yes, especially when she has been on steroids.  Thyroid C cell cancer: no  MEN syndrome personal or family hx: no  Nephrolithiasis: no      Assessment & Plan:  Patient's (Body mass index is 30.93 kg/m².) indicates that they are obese (BMI >30) with health conditions that include impaired fasting glucose, dyslipidemias, and GERD . Weight is improving with lifestyle modifications. BMI  is above average; BMI management plan is completed. We discussed portion control, increasing exercise, pharmacologic options including wellbutrin and naltrexone, and an teofilo-based approach such as Eyelation Pal or Lose It.     I have instructed the patient to continue with pursuit of medical weight loss as a part of this program. Patient does meet criteria for use of anorectics at this time as BMI >30 , body fat percentage >30%, hyperlipidemia, impaired fasting glucose, and is not at treatment goal.     The  current plan for this month includes:   - 3 month f/u visit and she is down about 5 lbs, had been taking well/nalt  - Adjust exercise as discussed  - Weight loss goal 4-6lbs this month  - Adjust macronutrients as discussed, work towards increasing protein and fiber. Bring food journal to next visit for review  - Continue Wellbutrin and Naltrexone for weight loss, refills sent  - Goal to walk daily until next visit, 20-30 minutes  - Avoid carbohydrates at dinner and later  - Treatment goal 195 lb    Orders:  -     Hemoglobin A1c  -     Comprehensive Metabolic Panel  -     Lipid Panel  -     Vitamin D,25-Hydroxy  -     buPROPion SR (WELLBUTRIN SR) 150 MG 12 hr tablet; Take 1 tablet by mouth 2 (Two) Times a Day.  Dispense: 60 tablet; Refill: 2    2. Insulin resistance  Overview:  frederick IR 2.3    Assessment & Plan:  Low carb diet, regular physical activity, and weight reduction of 10-15%.       Orders:  -     Hemoglobin A1c    3. Vitamin D deficiency  Overview:  Replaced 03/04/24  Recheck 6/2024    Assessment & Plan:  Need vitamin level prior to next visit    Orders:  -     Vitamin D,25-Hydroxy    4. Hyperlipidemia, unspecified hyperlipidemia type  Overview:  Ldl 146 (2023)    Assessment & Plan:   Lipid prior to next visit    Orders:  -     Lipid Panel    Other orders  -     naltrexone (DEPADE) 50 MG tablet; Take 1/2 tablet by mouth for 3 days or until nausea subsides then increase to 1/2 tablet by mouth twice a day.  Dispense: 30 tablet; Refill: 2          Follow Up   Return in about 1 month (around 9/29/2024).  Patient was given instructions and counseling regarding her condition or for health maintenance advice. Please see specific information pulled into the AVS if appropriate.     Corin Ray, APRN  08/29/2024

## 2024-09-25 ENCOUNTER — OFFICE VISIT (OUTPATIENT)
Dept: BARIATRICS/WEIGHT MGMT | Facility: CLINIC | Age: 54
End: 2024-09-25
Payer: COMMERCIAL

## 2024-09-25 VITALS
HEART RATE: 75 BPM | DIASTOLIC BLOOD PRESSURE: 74 MMHG | SYSTOLIC BLOOD PRESSURE: 132 MMHG | BODY MASS INDEX: 31.1 KG/M2 | WEIGHT: 175.5 LBS | HEIGHT: 63 IN

## 2024-09-25 DIAGNOSIS — E78.5 HYPERLIPIDEMIA, UNSPECIFIED HYPERLIPIDEMIA TYPE: ICD-10-CM

## 2024-09-25 DIAGNOSIS — E55.9 VITAMIN D DEFICIENCY: ICD-10-CM

## 2024-09-25 DIAGNOSIS — E66.9 OBESITY, CLASS I, BMI 30-34.9: Primary | ICD-10-CM

## 2024-09-25 DIAGNOSIS — E88.819 INSULIN RESISTANCE: ICD-10-CM

## 2024-09-25 LAB
25(OH)D3+25(OH)D2 SERPL-MCNC: 29.9 NG/ML (ref 30–100)
ALBUMIN SERPL-MCNC: 4.2 G/DL (ref 3.5–5.2)
ALBUMIN/GLOB SERPL: 2 G/DL
ALP SERPL-CCNC: 77 U/L (ref 39–117)
ALT SERPL-CCNC: 13 U/L (ref 1–33)
AST SERPL-CCNC: 12 U/L (ref 1–32)
BILIRUB SERPL-MCNC: 0.3 MG/DL (ref 0–1.2)
BUN SERPL-MCNC: 12 MG/DL (ref 6–20)
BUN/CREAT SERPL: 15.4 (ref 7–25)
CALCIUM SERPL-MCNC: 9.5 MG/DL (ref 8.6–10.5)
CHLORIDE SERPL-SCNC: 104 MMOL/L (ref 98–107)
CHOLEST SERPL-MCNC: 234 MG/DL (ref 0–200)
CO2 SERPL-SCNC: 29.2 MMOL/L (ref 22–29)
CREAT SERPL-MCNC: 0.78 MG/DL (ref 0.57–1)
EGFRCR SERPLBLD CKD-EPI 2021: 89.3 ML/MIN/1.73
GLOBULIN SER CALC-MCNC: 2.1 GM/DL
GLUCOSE SERPL-MCNC: 97 MG/DL (ref 65–99)
HBA1C MFR BLD: 5.6 % (ref 4.8–5.6)
HDLC SERPL-MCNC: 50 MG/DL (ref 40–60)
LDLC SERPL CALC-MCNC: 140 MG/DL (ref 0–100)
POTASSIUM SERPL-SCNC: 5.4 MMOL/L (ref 3.5–5.2)
PROT SERPL-MCNC: 6.3 G/DL (ref 6–8.5)
SODIUM SERPL-SCNC: 141 MMOL/L (ref 136–145)
TRIGL SERPL-MCNC: 244 MG/DL (ref 0–150)
VLDLC SERPL CALC-MCNC: 44 MG/DL (ref 5–40)

## 2024-09-25 PROCEDURE — 99214 OFFICE O/P EST MOD 30 MIN: CPT

## 2024-09-27 DIAGNOSIS — E87.5 HYPERKALEMIA: Primary | ICD-10-CM

## 2024-10-24 ENCOUNTER — TELEMEDICINE (OUTPATIENT)
Dept: INTERNAL MEDICINE | Facility: CLINIC | Age: 54
End: 2024-10-24
Payer: COMMERCIAL

## 2024-10-24 ENCOUNTER — TELEPHONE (OUTPATIENT)
Dept: INTERNAL MEDICINE | Facility: CLINIC | Age: 54
End: 2024-10-24

## 2024-10-24 ENCOUNTER — HOSPITAL ENCOUNTER (EMERGENCY)
Facility: HOSPITAL | Age: 54
Discharge: HOME | End: 2024-10-24
Attending: EMERGENCY MEDICINE
Payer: COMMERCIAL

## 2024-10-24 VITALS
BODY MASS INDEX: 31.28 KG/M2 | WEIGHT: 170 LBS | HEIGHT: 62 IN | TEMPERATURE: 98.1 F | DIASTOLIC BLOOD PRESSURE: 101 MMHG | HEART RATE: 120 BPM | RESPIRATION RATE: 16 BRPM | SYSTOLIC BLOOD PRESSURE: 152 MMHG | OXYGEN SATURATION: 98 %

## 2024-10-24 DIAGNOSIS — M79.2 NERVE PAIN: Primary | ICD-10-CM

## 2024-10-24 DIAGNOSIS — F32.A ANXIETY AND DEPRESSION: ICD-10-CM

## 2024-10-24 DIAGNOSIS — G56.01 CARPAL TUNNEL SYNDROME OF RIGHT WRIST: Primary | ICD-10-CM

## 2024-10-24 DIAGNOSIS — F41.9 ANXIETY AND DEPRESSION: ICD-10-CM

## 2024-10-24 DIAGNOSIS — E53.8 VITAMIN B12 DEFICIENCY: ICD-10-CM

## 2024-10-24 PROCEDURE — 99214 OFFICE O/P EST MOD 30 MIN: CPT | Performed by: PHYSICIAN ASSISTANT

## 2024-10-24 PROCEDURE — 99282 EMERGENCY DEPT VISIT SF MDM: CPT

## 2024-10-24 RX ORDER — CYCLOBENZAPRINE HCL 10 MG
10 TABLET ORAL 3 TIMES DAILY PRN
Qty: 12 TABLET | Refills: 0 | Status: SHIPPED | OUTPATIENT
Start: 2024-10-24

## 2024-10-24 NOTE — ED PROVIDER NOTES
Subjective   History of Present Illness  54-year-old      Review of Systems    Past Medical History:   Diagnosis Date   • Anxiety    • Arthritis    • Asthma    • Diverticulosis    • Elevated cholesterol    • GERD (gastroesophageal reflux disease) 2024   • History of transfusion     NO REACTION   • HTN (hypertension)    • Muscle spasm of back    • Suspected sleep apnea 2024       Allergies   Allergen Reactions   • Avelox  [Moxifloxacin Hcl] Anaphylaxis   • Tramadol Mental Status Change     Patient notes very emotional   • Latex Rash   • Morphine Itching   • Wound Dressing Adhesive Rash       Past Surgical History:   Procedure Laterality Date   • APPENDECTOMY OPEN  1970   •  SECTION      x3, had uterine rupture   • COLONOSCOPY     • ENDOSCOPY     • EXPLORATORY LAPAROTOMY      for adhesiolysis, also laparoscopyc   • HERNIA REPAIR      open, umbilical, concomitant with hysterecctomy   • HYSTERECTOMY      open, for fibroids   • LUMBAR DISCECTOMY FUSION INSTRUMENTATION N/A 10/19/2021    Procedure: LUMBAR LAMINECTOMY POSTERIOR LUMBAR INTERBODY FUSION L4-5;  Surgeon: Primo Chavez MD;  Location: [facility];  Service: Neurosurgery;  Laterality: N/A;       Family History   Problem Relation Age of Onset   • Depression Mother    • Heart disease Mother    • Obesity Mother    • Arthritis Mother    • Cancer Father    • Hypertension Father    • Heart disease Father    • Arthritis Father    • Diabetes Father    • Cancer Sister    • Depression Sister    • Obesity Sister    • Sleep apnea Sister    • Arthritis Sister    • Depression Brother    • Obesity Brother    • Depression Daughter    • Heart disease Maternal Grandmother    • Kidney disease Paternal Grandmother    • Heart disease Paternal Grandfather    • Arthritis Other    • Diabetes Other    • Hyperlipidemia Other    • Obesity Other    • Mental illness Other    • Heart attack Other        Social History     Socioeconomic History   • Marital  status:    Tobacco Use   • Smoking status: Never     Passive exposure: Never   • Smokeless tobacco: Never   Vaping Use   • Vaping status: Never Used   Substance and Sexual Activity   • Alcohol use: No   • Drug use: No   • Sexual activity: Defer     Partners: Male     Comment:            Objective   Physical Exam    Procedures           ED Course                                               Medical Decision Making      Final diagnoses:   Carpal tunnel syndrome of right wrist       ED Disposition  ED Disposition       ED Disposition   Discharge    Condition   Stable    Comment   --               Nato Verduzco MD  [address] [address]  [phone]      Call for appointment         Medication List        New Prescriptions      cyclobenzaprine 10 MG tablet  Commonly known as: FLEXERIL  Take 1 tablet by mouth 3 (Three) Times a Day As Needed for Muscle Spasms.     diclofenac 50 MG EC tablet  Commonly known as: VOLTAREN  Take 1 tablet by mouth 3 (Three) Times a Day.               Where to Get Your Medications        These medications were sent to [facility] [address] [place] [address] - [address] [facility] - [phone]  - [phone]   [address] [facility] [address]      Phone: [phone]   cyclobenzaprine 10 MG tablet  diclofenac 50 MG EC tablet          Vaping status: Never Used   Substance and Sexual Activity    Alcohol use: No    Drug use: No    Sexual activity: Yes     Partners: Male     Birth control/protection: Hysterectomy     Comment:            Objective   Physical Exam  Vitals and nursing note reviewed.   Constitutional:       General: She is not in acute distress.     Appearance: She is well-developed. She is not diaphoretic.   HENT:      Head: Normocephalic and atraumatic.      Nose: Nose normal.   Eyes:      General: No scleral icterus.     Conjunctiva/sclera: Conjunctivae normal.   Pulmonary:      Effort: Pulmonary effort is normal. No respiratory distress.   Musculoskeletal:      Cervical back: Normal range of motion and neck supple.      Comments: Right hand is mildly edematous cap refill less than 2 seconds there is no open wounds.  Radial ulnar pulses are palpable sensations intact.  Positive Tinel's at the wrist negative at the elbow on the right hand.   Skin:     General: Skin is warm and dry.   Neurological:      Mental Status: She is alert and oriented to person, place, and time.   Psychiatric:         Behavior: Behavior normal.         Procedures           ED Course  ED Course as of 10/29/24 1936   Tue Oct 29, 2024   1935 Discussed with Dr. Verduzco who will follow the patient up in the office.  She was put in a wrist immobilizer. [BIJU]      ED Course User Index  [BIJU] Ethan Caba PA    No results found for this or any previous visit (from the past 24 hours).  Note: In addition to lab results from this visit, the labs listed above may include labs taken at another facility or during a different encounter within the last 24 hours. Please correlate lab times with ED admission and discharge times for further clarification of the services performed during this visit.    No orders to display     Vitals:    10/24/24 1623   BP: (!) 152/101   BP Location: Left arm   Patient Position: Sitting   Pulse: 120   Resp: 16   Temp: 98.1 °F (36.7  "°C)   TempSrc: Oral   SpO2: 98%   Weight: 77.1 kg (170 lb)   Height: 157.5 cm (62\")     Medications - No data to display  ECG/EMG Results (last 24 hours)       ** No results found for the last 24 hours. **          No orders to display                                        No results found for this or any previous visit (from the past 24 hours).  Note: In addition to lab results from this visit, the labs listed above may include labs taken at another facility or during a different encounter within the last 24 hours. Please correlate lab times with ED admission and discharge times for further clarification of the services performed during this visit.    No orders to display     Vitals:    10/24/24 1623   BP: (!) 152/101   BP Location: Left arm   Patient Position: Sitting   Pulse: 120   Resp: 16   Temp: 98.1 °F (36.7 °C)   TempSrc: Oral   SpO2: 98%   Weight: 77.1 kg (170 lb)   Height: 157.5 cm (62\")     Medications - No data to display  ECG/EMG Results (last 24 hours)       ** No results found for the last 24 hours. **          No orders to display                 Medical Decision Making      Final diagnoses:   Carpal tunnel syndrome of right wrist       ED Disposition  ED Disposition       ED Disposition   Discharge    Condition   Stable    Comment   --               Nato Verduzco MD  [address] [id number] [phone]      Call for appointment         Medication List        New Prescriptions      cyclobenzaprine 10 MG tablet  Commonly known as: FLEXERIL  Take 1 tablet by mouth 3 (Three) Times a Day As Needed for Muscle Spasms.     diclofenac 50 MG EC tablet  Commonly known as: VOLTAREN  Take 1 tablet by mouth 3 (Three) Times a Day.               Where to Get Your Medications        These medications were sent to [facility] [address] [place] [address] - [address] [facility] - [phone]  - [phone]   [address] [facility] [address]      Phone: [phone] "   cyclobenzaprine 10 MG tablet  diclofenac 50 MG EC tablet            Ethan Caba PA  10/29/24 1936

## 2024-10-24 NOTE — PROGRESS NOTES
[facility]  [address]  [facility] [address]  Dept: [phone]  Dept Fax: [phone]  Loc: [phone]  Loc Fax: [phone]    Nita Mcmahon  1970    Televisit Note    You have chosen to receive care through a telephone visit. Do you consent to use a telephone visit for your medical care today? Yes. Pt at home and provider working from home during visit today.    History of Present Illness:  Nita Mcmahon is a 54 y.o. female who presents via video-conference for kalani pain or RUE. Has been going on for several days now. Had CTS in the past and states this is what it feels like. Never had release surgery.    The following portions of the patient's history were reviewed and updated as appropriate: allergies, current medications, past family history, past medical history, past social history, past surgical history, and problem list.    This visit was scheduled as a phone visit to comply with patient safety concerns in accordance with CDC recommendations.  Time spent in discussion with the patient was 20 minutes.     Medications    Current Outpatient Medications:     buPROPion SR (WELLBUTRIN SR) 150 MG 12 hr tablet, Take 1 tablet by mouth 2 (Two) Times a Day., Disp: 60 tablet, Rfl: 2    estradiol (ESTRACE) 1 MG tablet, Take 1 tablet by mouth Daily., Disp: 90 tablet, Rfl: 3    levocetirizine (XYZAL) 5 MG tablet, Take 1 tablet by mouth Every Evening., Disp: , Rfl:     multivitamin with minerals tablet tablet, Take 1 tablet by mouth Daily., Disp: , Rfl:     naltrexone (DEPADE) 50 MG tablet, Take 1/2 tablet by mouth for 3 days or until nausea subsides then increase to 1/2 tablet by mouth twice a day., Disp: 30 tablet, Rfl: 2    venlafaxine XR (EFFEXOR-XR) 75 MG 24 hr capsule, Take 1 capsule by mouth Daily., Disp: 90 capsule, Rfl: 3    Current Facility-Administered Medications:     cyanocobalamin injection 1,000 mcg, 1,000 mcg, Intramuscular, Q28  Felton Reagan Paige Nealy, MD, 1,000 mcg at 24 1338    Subjective  Allergies   Allergen Reactions    Avelox  [Moxifloxacin Hcl] Anaphylaxis    Tramadol Mental Status Change     Patient notes very emotional    Latex Rash    Morphine Itching    Wound Dressing Adhesive Rash        Past Medical History:   Diagnosis Date    Anxiety     Arthritis     Asthma     Diverticulosis     Elevated cholesterol     GERD (gastroesophageal reflux disease) 2024    History of transfusion     NO REACTION    HTN (hypertension)     Muscle spasm of back     Suspected sleep apnea 2024       Past Surgical History:   Procedure Laterality Date    APPENDECTOMY OPEN       SECTION      x3, had uterine rupture    COLONOSCOPY      ENDOSCOPY      EXPLORATORY LAPAROTOMY      for adhesiolysis, also laparoscopyc    HERNIA REPAIR      open, umbilical, concomitant with hysterecctomy    HYSTERECTOMY      open, for fibroids    LUMBAR DISCECTOMY FUSION INSTRUMENTATION N/A 10/19/2021    Procedure: LUMBAR LAMINECTOMY POSTERIOR LUMBAR INTERBODY FUSION L4-5;  Surgeon: Primo Chavez MD;  Location: [facility];  Service: Neurosurgery;  Laterality: N/A;       Family History   Problem Relation Age of Onset    Depression Mother     Heart disease Mother     Obesity Mother     Arthritis Mother     Cancer Father     Hypertension Father     Heart disease Father     Arthritis Father     Diabetes Father     Cancer Sister     Depression Sister     Obesity Sister     Sleep apnea Sister     Arthritis Sister     Depression Brother     Obesity Brother     Depression Daughter     Heart disease Maternal Grandmother     Kidney disease Paternal Grandmother     Heart disease Paternal Grandfather     Arthritis Other     Diabetes Other     Hyperlipidemia Other     Obesity Other     Mental illness Other     Heart attack Other         Social History     Socioeconomic History    Marital status:    Tobacco Use    Smoking status: Never      Passive exposure: Never    Smokeless tobacco: Never   Vaping Use    Vaping status: Never Used   Substance and Sexual Activity    Alcohol use: No    Drug use: No    Sexual activity: Defer     Partners: Male     Comment:          Objective  There were no vitals filed for this visit.  There is no height or weight on file to calculate BMI.    Assessment  Diagnoses and all orders for this visit:    1. Nerve pain (Primary)  -     EMG & Nerve Conduction Test; Future  -     Ambulatory Referral to Neurosurgery    2. Anxiety and depression    3. Vitamin B12 deficiency        Plan    1. Nerve pain (Primary)- worse, ordered nerve conduction studies and referred to specialist.    2. Anxiety and depression- stable on effexor and wellbutrin.    3. Vitamin B12 deficiency- stable on monthly injections.      Return in about 6 weeks (around 12/5/2024) for Recheck w/ Dr. Dixon..    Sunday Calderon PA-C  10/24/2024

## 2024-10-24 NOTE — TELEPHONE ENCOUNTER
"-Caller: Nita Mcmahon \"Karuna\"    Relationship: Self    Best call back number: [phone]    What is the medical concern/diagnosis: CARPAL TUNNEL-RIGHT HAND SWOLLEN, PAINFUL    What specialty or service is being requested: NEUROSURGERY    What is the provider, practice or medical service name: DR NET[facility] NEUROSURGERY    What is the office location: [facility] additional details: PATIENT IS HAVING A BAD FLARE UP OF CARPAL TUNNEL AND NEEDS REFERRAL TO BE SEEN. PATIENT IS UNABLE TO USE HER RIGHT HAND SINCE SWOLLEN AND VERY PAINFUL. [facility]    RECEIVED A STEROID SHOT AND PREDNISONE HOWEVER PATIENT IS WORSE TODAY. PLEASE ADVISE  "

## 2024-10-28 ENCOUNTER — OFFICE VISIT (OUTPATIENT)
Age: 54
End: 2024-10-28
Payer: COMMERCIAL

## 2024-10-28 VITALS
HEIGHT: 63 IN | WEIGHT: 180.6 LBS | BODY MASS INDEX: 32 KG/M2 | SYSTOLIC BLOOD PRESSURE: 130 MMHG | DIASTOLIC BLOOD PRESSURE: 68 MMHG

## 2024-10-28 DIAGNOSIS — M25.531 RIGHT WRIST PAIN: ICD-10-CM

## 2024-10-28 DIAGNOSIS — G56.01 CARPAL TUNNEL SYNDROME OF RIGHT WRIST: Primary | ICD-10-CM

## 2024-10-28 PROCEDURE — 99203 OFFICE O/P NEW LOW 30 MIN: CPT | Performed by: PLASTIC SURGERY

## 2024-10-28 RX ORDER — PREDNISONE 10 MG/1
TABLET ORAL
COMMUNITY
Start: 2024-10-23

## 2024-10-28 RX ORDER — LEVOCETIRIZINE DIHYDROCHLORIDE 5 MG/1
1 TABLET, FILM COATED ORAL DAILY
COMMUNITY

## 2024-10-28 NOTE — PROGRESS NOTES
[facility] Orthopedic     Office Visit       Date: 10/28/2024   Patient Name: Nita Mcmahon  MRN: [id number]  YOB: 1970    Referring Physician: Mary Dixon*     Chief Complaint:   Chief Complaint   Patient presents with   • Right Wrist - Pain     Right wrist pain--carpal tunnel syndrome       History of Present Illness:   Nita Mcmahon is a 54 y.o. female right-hand-dominant presents with right wrist and hand pain and swelling of 1 week duration.  Patient reports that approximately 1 week ago she woke up with significant pain and swelling of the right wrist.  She reports difficulty with range of motion as well as numbness and tingling in her thumb index middle finger.  She also reports limited flexion of the hand.  Denies inciting trauma.  She was initially put on oral steroids and then presented to the emergency room for continued pain.  She reports pain has been improving since then.  Reports numbness and tingling has also been improving since then.  She reports that prior to this episode she does occasionally have numbness and tingling her thumb index and middle finger that was worse at night and would occasionally wake her from sleep.  She is otherwise healthy.  She denies smoking.  She works in property.      Subjective   Review of Systems:   Review of Systems   Constitutional: Negative.  Negative for chills, fatigue and fever.   HENT: Negative.  Negative for congestion and dental problem.    Eyes: Negative.  Negative for blurred vision.   Respiratory: Negative.  Negative for shortness of breath.    Cardiovascular: Negative.  Negative for leg swelling.   Gastrointestinal: Negative.  Negative for abdominal pain.   Endocrine: Negative.  Negative for polyuria.   Genitourinary: Negative.  Negative for difficulty urinating.   Musculoskeletal:  Positive for arthralgias.   Skin: Negative.    Allergic/Immunologic:  "Negative.    Neurological: Negative.    Hematological: Negative.  Negative for adenopathy.   Psychiatric/Behavioral: Negative.  Negative for behavioral problems.         Pertinent review of systems per HPI.     I reviewed the patient's chief complaint, history of present illness, review of systems, past medical history, surgical history, family history, social history, medications and allergy list in the EMR on 10/28/2024 and agree with the findings above.    Objective    Vital Signs:   Vitals:    10/28/24 0824   BP: 130/68   Weight: 81.9 kg (180 lb 9.6 oz)   Height: 160.4 cm (63.15\")     BMI: Body mass index is 31.84 kg/m².    General Appearance: No acute distress. Alert and oriented.     Chest:  Non-labored breathing on room air. Regular rate and rhythm.    Upper Extremity Exam:    Very minimal swelling of the right wrist.  Nontender palpation throughout the radiocarpal joint.  No pain with axial loading.  Full flexion extension of the wrist.    Negative Tinel over the carpal tunnel.  Positive carpal compression test.  No thenar wasting.  Negative Tinel at the cubital tunnel.  Negative elbow flexion compression test.  Negative Tinel over Guyon's.    Fingers are warm, well-perfused with appropriate capillary refill.  Palpable radial pulse.    Sensation intact to light touch in median, radial and ulnar nerve distributions.    Motor- Fires [employer], ulnar intrinsics, EPL/EDC w/ full active and passive range of motion. Strength intact.    Non-tender except for in the areas highlighted    Imaging/Studies:   Imaging Results (Last 24 Hours)       Procedure Component Value Units Date/Time    XR Wrist 3+ View Right [[id number]] Resulted: 10/28/24 0854     Updated: 10/28/24 0854              Procedures:  Procedures    Quality Measures:   ACP:   ACP discussion was deferred.    Tobacco:   Nita Mcmahon  reports that she has never smoked. She has never been exposed to tobacco smoke. She has never used smokeless " tobacco.      Assessment / Plan    Assessment/Plan:     There are no diagnoses linked to this encounter.     Nita Granados a 54 y.o. female who presents with:      ICD-10-CM ICD-9-CM   1. Carpal tunnel syndrome of right wrist  G56.01 354.0   2. Right wrist pain  M25.531 719.43         Patient presents with an episode of atraumatic right wrist pain and swelling that occurred approximately a week ago.  That is improving with oral steroids and anti-inflammatories.  Based on the patient's history it sounds like it was likely a pseudogout versus gout attack that also caused an exacerbation of her carpal tunnel syndrome symptoms.  She has no evidence of wrist pain today however does have some evidence of carpal tunnel syndrome on exam.  We discussed diagnosis of gout and pseudogout as well as the options for treatment including anti-inflammatories and oral steroids if she were to have another attack.  We also discussed carpal tunnel syndrome as well as the options for treatment occluding bracing, corticosteroid injections and surgery.  Recommend EMG nerve conduction studies of the right upper extremity.  Recommend continue nighttime bracing of the right wrist.  Recommend follow-up after EMG nerve studies to discuss the results.    Follow Up:   Return for Follow-up after EMG/NCS.        Nato Verduzco MD  [facility] Surgeon

## 2024-10-29 ENCOUNTER — TELEPHONE (OUTPATIENT)
Dept: ORTHOPEDIC SURGERY | Facility: CLINIC | Age: 54
End: 2024-10-29
Payer: COMMERCIAL

## 2024-10-29 NOTE — TELEPHONE ENCOUNTER
Called patient back and advised her to call  office being that, that's who put the order in for the EMG.     No further questions, closing TE.

## 2024-10-29 NOTE — TELEPHONE ENCOUNTER
"Caller: Nita Mcmahon \"Karuna\"    Relationship: Self    Best call back number: [phone]    What orders are you requesting (i.e. lab or imaging): EMG    In what timeframe would the patient need to come in: ASAP    Where will you receive your lab/imaging services:  [facility] [phone]    Additional notes: THE ORIGINAL PLACE WOULD NOT COVER - PLEASE REACH OUT TO PATIENT AFTER SENDING ORDER OVER           "

## 2024-11-05 NOTE — PROGRESS NOTES
[facility]  [address]  [place] [address]     Office Note      Date: 2024  Patient Name: Nita Mcmahon  MRN: [id number]  : 1970    Subjective     Chief Complaint  Obesity Management follow-up          Nita Mcmahon presents to [facility] for obesity management.   Patient is unsure with weight loss progress. Appetite is moderately controlled. On wellbutrin and naltrexone. Reports no side effects of prescribed medications today. The patient is taking multivitamin and is not taking fish oil.  The patient is not using a food journal.  She has been very emotional recently, under stress at work and home, feeling pressure and feels maybe there is a connection with use of wellbutrin as she feels tearful all the time. She is also having some leg cramps at this time. She denies adequate hydration and feels she could do a better job at this. Also not logging food and admit to eating more carbohydrates than she should have.     The patient is exercising with a FITT score of:    Frequency Intensity Time Strength Training   []   0, none []   0 []   0 [x]   0   [x]   1 (1-2x/week) [x]   1 (light) []   1 (<10 min) []   1 (1x/week)   []   2 (3-5x/week) []   2 (moderate) [x]   2 (10-20 min) []   2 (2x/week)   []   3 (daily) []   3 (moderately hard)  []   4 (very hard) []   3 (20-30 min)  []   4 (>30 min) []   3 (3-4x/week)       Review of Systems   Constitutional:  Positive for fatigue. Negative for appetite change.   Eyes:  Negative for visual disturbance.   Cardiovascular:  Negative for chest pain and palpitations.   Gastrointestinal:  Negative for constipation and indigestion.   Musculoskeletal:  Positive for joint swelling.   Neurological:  Positive for weakness and light-headedness.   Psychiatric/Behavioral:  Positive for behavioral problems and stress. The patient is nervous/anxious.          Current Outpatient Medications:      "estradiol (ESTRACE) 1 MG tablet, Take 1 tablet by mouth Daily., Disp: 90 tablet, Rfl: 3    levocetirizine (Xyzal Allergy 24HR) 5 MG tablet, Take 1 tablet by mouth Daily., Disp: , Rfl:     multivitamin with minerals (MULTIVITAMIN ADULT PO), , Disp: , Rfl:     venlafaxine XR (EFFEXOR-XR) 75 MG 24 hr capsule, Take 1 capsule by mouth Daily., Disp: 90 capsule, Rfl: 3    buPROPion (WELLBUTRIN) 75 MG tablet, Take 1 tablet by mouth Daily., Disp: 30 tablet, Rfl: 1    naltrexone (DEPADE) 50 MG tablet, Take 1/2 tablet by mouth for 3 days or until nausea subsides then increase to 1/2 tablet by mouth twice a day., Disp: 30 tablet, Rfl: 2    vitamin D (ERGOCALCIFEROL) 1.25 MG (67270 UT) capsule capsule, Take 1 capsule by mouth 1 (One) Time Per Week., Disp: 12 capsule, Rfl: 0    Current Facility-Administered Medications:     cyanocobalamin injection 1,000 mcg, 1,000 mcg, Intramuscular, Q28 Days, Autumn Ya MD, 1,000 mcg at 08/29/24 1338    Objective   Start weight: 195 pounds.    Total Loss lb/%Loss of beginning body weight (BBW): -21.2lb/-10.87%  Change in weight since last visit: -2    Recent Weight History:   Wt Readings from Last 6 Encounters:   11/13/24 78.8 kg (173 lb 12.8 oz)   10/28/24 81.9 kg (180 lb 9.6 oz)   10/24/24 77.1 kg (170 lb)   09/25/24 79.6 kg (175 lb 8 oz)   08/29/24 79.2 kg (174 lb 9.6 oz)   05/30/24 81.6 kg (179 lb 12.8 oz)       Body mass index is 30.79 kg/m².   Body composition analysis completed and showed:   Body Fat %: 41.7    Measurements (in inches)  Waist Circumference: 41.5      Vital Signs:   /90   Pulse 89   Ht 160 cm (63\")   Wt 78.8 kg (173 lb 12.8 oz)   BMI 30.79 kg/m²     Physical Exam   General appears stated age and normal appearance   HEENT conjunctivae normal   Chest/lungs Normal rate and Breathing is unlabored   Extremities    Neuro Good historian and No focal deficit   Skin    Psych normal behavior, normal thought content, and normal concentration       Common labs  "         2/21/2024    11:12 9/25/2024    08:36 11/12/2024    09:16   Common Labs   Glucose 97  97  96    BUN 16  12  12    Creatinine 0.76  0.78  0.77    Sodium 140  141  139    Potassium 5.1  5.4  5.5    Chloride 101  104  104    Calcium 9.8  9.5  9.3    Total Protein 7.0  6.3     Albumin 4.5  4.2     Total Bilirubin 0.3  0.3     Alkaline Phosphatase 76  77     AST (SGOT) 22  12     ALT (SGPT) 21  13     WBC 6.9      Hemoglobin 14.5      Hematocrit 43.8      Platelets 395      Total Cholesterol  234     Triglycerides  244     HDL Cholesterol  50     LDL Cholesterol   140     Hemoglobin A1C 5.9  5.60       TSH          12/29/2023    08:36   TSH   TSH 2.880               Assessment / Plan        Diagnoses and all orders for this visit:    1. Obesity, Class I, BMI 30-34.9 (Primary)  Overview:  Start weight: (195): 1st goal (10% weight loss): 176 lb  Target Goal: 136-195 lb    +binge eating tendencies.    Current medication prescribed by [facility]: Wellbutrin, Naltrexone  Rx Options: GLP not covered unless DM2  Rx Caution: P37 (palpitations, HTN)    Comorbid conditions: HLD, GERD, Insulin resistance, Vit D defi, anx/depression  Food:   Exercise:     Pancreatitis: no  Glaucoma: no  Headaches: frequent due to sinusitis  HTN: yes, currently no meds.  Heart palpitations: yes, especially when she has been on steroids.  Thyroid C cell cancer: no  MEN syndrome personal or family hx: no  Nephrolithiasis: no      Assessment & Plan:  Patient's (Body mass index is 30.79 kg/m².) indicates that they are obese (BMI >30) with health conditions that include impaired fasting glucose, dyslipidemias, and GERD . Weight is improving with treatment. BMI  is above average; BMI management plan is completed. We discussed portion control, increasing exercise, pharmacologic options including wellbutrin and naltrexone, and an teofilo-based approach such as Shopalytic Pal or Lose It.     I have instructed the patient to continue with pursuit of medical weight  loss as a part of this program. Patient does meet criteria for use of anorectics at this time as BMI >30 , hyperlipidemia, impaired fasting glucose, and is not at treatment goal.     The current plan for this month includes:   - 2 month f/u visit, she is down 2 lbs with wellbutrin/naltrexone/lifestyle changes  - Weight loss goal 4-6lbs this month  - Continue nutrition focus  - Continue to prioritize protein, fiber, and hydration.   - Decrease Wellbutrin to 75, plan to wean by taking 1 tablet daily for 7 days then every other day for 7 days. After this you can cut in half take every other day then stop.   - Continue naltrexone at this time  - Practice stress reduction, box breathing, self care  - Recheck BMP January. If potassium still elevated she may need further evaluation/specialty referral especially if BP is staying elevated.   - Treatment goal 195 lb    Orders:  -     buPROPion (WELLBUTRIN) 75 MG tablet; Take 1 tablet by mouth Daily.  Dispense: 30 tablet; Refill: 1  -     naltrexone (DEPADE) 50 MG tablet; Take 1/2 tablet by mouth for 3 days or until nausea subsides then increase to 1/2 tablet by mouth twice a day.  Dispense: 30 tablet; Refill: 2    2. Hyperlipidemia, unspecified hyperlipidemia type  Overview:  Ldl 146 (2023)      3. Insulin resistance  Overview:  frederick IR 2.3    Assessment & Plan:  Continue low carb diet, regular physical activity, and weight reduction of 10-15%.         4. Suspected sleep apnea    5. Hyperkalemia  Overview:  K- 5.5 (11/2024); K-5.4 (9/2024)    Assessment & Plan:  Recheck Jan, this is new finding, no medications that contributing, discussed decreasing potassium in diet, stopping multivitamin at this time and increasing hydration. If still elevated next recheck with elevated Bp needs additional evaluation      6. Vitamin D insufficiency  Overview:  Vit D 29 (9/2024); Vit D 21 (3/2024); Replaced 11/2024; recheck Feb 2025    Assessment & Plan:  Vit D 29 (9/2024); start supp once  weekly for 3 months, recheck lab feb 2025    Orders:  -     vitamin D (ERGOCALCIFEROL) 1.25 MG (42438 UT) capsule capsule; Take 1 capsule by mouth 1 (One) Time Per Week.  Dispense: 12 capsule; Refill: 0    7. Elevated blood pressure reading without diagnosis of hypertension  Assessment & Plan:  BP is elevated today with recheck, reading 150/90. She is under immense stress, also on effexor/wellbutrin which could be contributing. Weaning off wellbutrin. Advised to monitor BP daily at home, record and bring to follow up visit. Low salt diet.             Follow Up   Return in about 5 weeks (around 12/18/2024).  Patient was given instructions and counseling regarding her condition or for health maintenance advice. Please see specific information pulled into the AVS if appropriate.     Corin Ray, APRN  11/13/2024

## 2024-11-13 ENCOUNTER — OFFICE VISIT (OUTPATIENT)
Dept: BARIATRICS/WEIGHT MGMT | Facility: CLINIC | Age: 54
End: 2024-11-13
Payer: COMMERCIAL

## 2024-11-13 VITALS
HEIGHT: 63 IN | BODY MASS INDEX: 30.79 KG/M2 | DIASTOLIC BLOOD PRESSURE: 90 MMHG | SYSTOLIC BLOOD PRESSURE: 150 MMHG | WEIGHT: 173.8 LBS | HEART RATE: 89 BPM

## 2024-11-13 DIAGNOSIS — E78.5 HYPERLIPIDEMIA, UNSPECIFIED HYPERLIPIDEMIA TYPE: ICD-10-CM

## 2024-11-13 DIAGNOSIS — E66.811 OBESITY, CLASS I, BMI 30-34.9: Primary | ICD-10-CM

## 2024-11-13 DIAGNOSIS — E88.819 INSULIN RESISTANCE: ICD-10-CM

## 2024-11-13 DIAGNOSIS — E55.9 VITAMIN D INSUFFICIENCY: ICD-10-CM

## 2024-11-13 DIAGNOSIS — E87.5 HYPERKALEMIA: ICD-10-CM

## 2024-11-13 DIAGNOSIS — R29.818 SUSPECTED SLEEP APNEA: ICD-10-CM

## 2024-11-13 DIAGNOSIS — R03.0 ELEVATED BLOOD PRESSURE READING WITHOUT DIAGNOSIS OF HYPERTENSION: ICD-10-CM

## 2024-11-13 LAB
BUN SERPL-MCNC: 12 MG/DL (ref 6–20)
BUN/CREAT SERPL: 15.6 (ref 7–25)
CALCIUM SERPL-MCNC: 9.3 MG/DL (ref 8.6–10.5)
CHLORIDE SERPL-SCNC: 104 MMOL/L (ref 98–107)
CO2 SERPL-SCNC: 29.7 MMOL/L (ref 22–29)
CREAT SERPL-MCNC: 0.77 MG/DL (ref 0.57–1)
EGFRCR SERPLBLD CKD-EPI 2021: 90.7 ML/MIN/1.73
GLUCOSE SERPL-MCNC: 96 MG/DL (ref 65–99)
POTASSIUM SERPL-SCNC: 5.5 MMOL/L (ref 3.5–5.2)
SODIUM SERPL-SCNC: 139 MMOL/L (ref 136–145)

## 2024-11-13 PROCEDURE — 99214 OFFICE O/P EST MOD 30 MIN: CPT

## 2024-11-13 RX ORDER — BUPROPION HYDROCHLORIDE 75 MG/1
75 TABLET ORAL DAILY
Qty: 30 TABLET | Refills: 1 | Status: SHIPPED | OUTPATIENT
Start: 2024-11-13

## 2024-11-13 RX ORDER — ERGOCALCIFEROL 1.25 MG/1
50000 CAPSULE, LIQUID FILLED ORAL WEEKLY
Qty: 12 CAPSULE | Refills: 0 | Status: SHIPPED | OUTPATIENT
Start: 2024-11-13

## 2024-11-13 RX ORDER — NALTREXONE HYDROCHLORIDE 50 MG/1
TABLET, FILM COATED ORAL
Qty: 30 TABLET | Refills: 2 | Status: SHIPPED | OUTPATIENT
Start: 2024-11-13

## 2024-11-13 RX ORDER — BUPROPION HYDROCHLORIDE 150 MG/1
150 TABLET, EXTENDED RELEASE ORAL 2 TIMES DAILY
Qty: 60 TABLET | Refills: 2 | Status: CANCELLED | OUTPATIENT
Start: 2024-11-13

## 2024-11-13 RX ORDER — NALTREXONE HYDROCHLORIDE 50 MG/1
TABLET, FILM COATED ORAL
Qty: 30 TABLET | Refills: 2 | Status: CANCELLED | OUTPATIENT
Start: 2024-11-13

## 2024-11-13 NOTE — ASSESSMENT & PLAN NOTE
Patient's (Body mass index is 30.79 kg/m².) indicates that they are obese (BMI >30) with health conditions that include impaired fasting glucose, dyslipidemias, and GERD . Weight is improving with treatment. BMI  is above average; BMI management plan is completed. We discussed portion control, increasing exercise, pharmacologic options including wellbutrin and naltrexone, and an teofilo-based approach such as Brenco Pal or Lose It.     I have instructed the patient to continue with pursuit of medical weight loss as a part of this program. Patient does meet criteria for use of anorectics at this time as BMI >30 , hyperlipidemia, impaired fasting glucose, and is not at treatment goal.     The current plan for this month includes:   - 2 month f/u visit, she is down 2 lbs with wellbutrin/naltrexone/lifestyle changes  - Weight loss goal 4-6lbs this month  - Continue nutrition focus  - Continue to prioritize protein, fiber, and hydration.   - Decrease Wellbutrin to 75, plan to wean by taking 1 tablet daily for 7 days then every other day for 7 days. After this you can cut in half take every other day then stop.   - Continue naltrexone at this time  - Practice stress reduction, box breathing, self care  - Recheck BMP January. If potassium still elevated she may need further evaluation/specialty referral especially if BP is staying elevated.   - Treatment goal 195 lb

## 2024-11-13 NOTE — ASSESSMENT & PLAN NOTE
BP is elevated today with recheck, reading 150/90. She is under immense stress, also on effexor/wellbutrin which could be contributing. Weaning off wellbutrin. Advised to monitor BP daily at home, record and bring to follow up visit. Low salt diet.

## 2024-11-13 NOTE — ASSESSMENT & PLAN NOTE
Recheck Jose, this is new finding, no medications that contributing, discussed decreasing potassium in diet, stopping multivitamin at this time and increasing hydration. If still elevated next recheck with elevated Bp needs additional evaluation

## 2024-12-05 ENCOUNTER — OFFICE VISIT (OUTPATIENT)
Dept: INTERNAL MEDICINE | Facility: CLINIC | Age: 54
End: 2024-12-05
Payer: COMMERCIAL

## 2024-12-05 VITALS
DIASTOLIC BLOOD PRESSURE: 86 MMHG | BODY MASS INDEX: 31.18 KG/M2 | HEIGHT: 63 IN | SYSTOLIC BLOOD PRESSURE: 138 MMHG | WEIGHT: 176 LBS | OXYGEN SATURATION: 97 % | HEART RATE: 86 BPM

## 2024-12-05 DIAGNOSIS — F32.A ANXIETY AND DEPRESSION: ICD-10-CM

## 2024-12-05 DIAGNOSIS — I10 BENIGN ESSENTIAL HTN: Primary | ICD-10-CM

## 2024-12-05 DIAGNOSIS — F41.9 ANXIETY AND DEPRESSION: ICD-10-CM

## 2024-12-05 PROCEDURE — 99214 OFFICE O/P EST MOD 30 MIN: CPT | Performed by: INTERNAL MEDICINE

## 2024-12-05 RX ORDER — MELOXICAM 15 MG/1
15 TABLET ORAL DAILY
COMMUNITY

## 2024-12-05 RX ORDER — VENLAFAXINE HYDROCHLORIDE 37.5 MG/1
37.5 CAPSULE, EXTENDED RELEASE ORAL DAILY
Qty: 30 CAPSULE | Refills: 5 | Status: SHIPPED | OUTPATIENT
Start: 2024-12-05

## 2024-12-05 RX ORDER — NEBIVOLOL 2.5 MG/1
2.5 TABLET ORAL DAILY
Qty: 30 TABLET | Refills: 5 | Status: SHIPPED | OUTPATIENT
Start: 2024-12-05

## 2024-12-05 NOTE — PROGRESS NOTES
"Subjective   Nita Mcmahon is a 54 y.o. female here for follow-up elevated blood pressure readings and worsening anxiety and depression.  She is currently on Effexor.  She was on Wellbutrin but felt like it made her a lot worse and have more anxiety so she actually stopped that.  It was prescribed by weight loss clinic to help with energy and appetite.  She has been successful in losing quite a bit of weight.  Still on naltrexone.  She has noticed increased blood pressure lately, forgot her phone that had all of the readings on it.  She says the average systolic seems to be maybe in the 130s, possibly 140s.  She was previously treated with lisinopril but took herself off of it.  When she has a blood pressure spike she is symptomatic with flushing and headaches.    I have reviewed the patient's relevant medical history and confirmed it is accurate.    I have personally reviewed and performed the ROS. Mary Dixon MD     Objective   /86 (BP Location: Left arm)   Pulse 86   Ht 160 cm (63\")   Wt 79.8 kg (176 lb)   SpO2 97%   BMI 31.18 kg/m²     Physical Exam  Constitutional:       Appearance: She is well-developed.   Pulmonary:      Effort: Pulmonary effort is normal.   Neurological:      General: No focal deficit present.      Mental Status: She is alert.   Psychiatric:         Behavior: Behavior normal.         Thought Content: Thought content normal.         Judgment: Judgment normal.           Current Outpatient Medications:     estradiol (ESTRACE) 1 MG tablet, Take 1 tablet by mouth Daily., Disp: 90 tablet, Rfl: 3    levocetirizine (Xyzal Allergy 24HR) 5 MG tablet, Take 1 tablet by mouth Daily., Disp: , Rfl:     meloxicam (MOBIC) 15 MG tablet, Take 1 tablet by mouth Daily., Disp: , Rfl:     naltrexone (DEPADE) 50 MG tablet, Take 1/2 tablet by mouth for 3 days or until nausea subsides then increase to 1/2 tablet by mouth twice a day., Disp: 30 tablet, Rfl: 2    venlafaxine XR (EFFEXOR-XR) " 75 MG 24 hr capsule, Take 1 capsule by mouth Daily., Disp: 90 capsule, Rfl: 3    vitamin D (ERGOCALCIFEROL) 1.25 MG (91322 UT) capsule capsule, Take 1 capsule by mouth 1 (One) Time Per Week., Disp: 12 capsule, Rfl: 0    Current Facility-Administered Medications:     cyanocobalamin injection 1,000 mcg, 1,000 mcg, Intramuscular, Q28 Days, Autumn Ya MD, 1,000 mcg at 08/29/24 1338    Assessment & Plan   Diagnoses and all orders for this visit:    1. Benign essential HTN (Primary)  -     nebivolol (Bystolic) 2.5 MG tablet; Take 1 tablet by mouth Daily.  Dispense: 30 tablet; Refill: 5  -Start Bystolic for HTN, follow-up 2 weeks    2. Anxiety and depression  -     venlafaxine XR (Effexor XR) 37.5 MG 24 hr capsule; Take 1 capsule by mouth Daily.  Dispense: 30 capsule; Refill: 5  -Increase Effexor due to worsening anxiety and depression  -Off Wellbutrin             Mary Dixon MD

## 2024-12-19 ENCOUNTER — OFFICE VISIT (OUTPATIENT)
Dept: INTERNAL MEDICINE | Facility: CLINIC | Age: 54
End: 2024-12-19
Payer: COMMERCIAL

## 2024-12-19 VITALS
HEIGHT: 63 IN | SYSTOLIC BLOOD PRESSURE: 124 MMHG | HEART RATE: 68 BPM | WEIGHT: 175 LBS | OXYGEN SATURATION: 99 % | DIASTOLIC BLOOD PRESSURE: 82 MMHG | BODY MASS INDEX: 31.01 KG/M2

## 2024-12-19 DIAGNOSIS — F32.A ANXIETY AND DEPRESSION: Primary | ICD-10-CM

## 2024-12-19 DIAGNOSIS — F41.9 ANXIETY AND DEPRESSION: Primary | ICD-10-CM

## 2024-12-19 DIAGNOSIS — I10 BENIGN ESSENTIAL HTN: ICD-10-CM

## 2024-12-19 PROCEDURE — 99214 OFFICE O/P EST MOD 30 MIN: CPT | Performed by: INTERNAL MEDICINE

## 2024-12-19 RX ORDER — NEBIVOLOL 5 MG/1
5 TABLET ORAL DAILY
Qty: 30 TABLET | Refills: 5 | Status: SHIPPED | OUTPATIENT
Start: 2024-12-19

## 2024-12-19 NOTE — PROGRESS NOTES
"Subjective   Nita Mcmahon is a 54 y.o. female here for follow-up HTN and anxiety and depression.  Her dose was upped on the Effexor about 2 weeks ago and she says she is feeling pretty good mood wise so she thinks that the increase has helped.  Also newly on Bystolic, previously on lisinopril.  Brings in blood pressure readings which show overall uncontrolled values with systolic in the 140s and 150s.  No negative side effects to Bystolic.    I have reviewed the patient's relevant medical history and confirmed it is accurate.    I have personally reviewed and performed the ROS. Mary Dixon MD     Objective   /82 (BP Location: Left arm)   Pulse 68   Ht 160 cm (63\")   Wt 79.4 kg (175 lb)   SpO2 99%   BMI 31.00 kg/m²     Physical Exam  Constitutional:       Appearance: She is well-developed.   Pulmonary:      Effort: Pulmonary effort is normal.   Neurological:      General: No focal deficit present.      Mental Status: She is alert.   Psychiatric:         Behavior: Behavior normal.         Thought Content: Thought content normal.         Judgment: Judgment normal.           Current Outpatient Medications:     estradiol (ESTRACE) 1 MG tablet, Take 1 tablet by mouth Daily., Disp: 90 tablet, Rfl: 3    levocetirizine (Xyzal Allergy 24HR) 5 MG tablet, Take 1 tablet by mouth Daily., Disp: , Rfl:     meloxicam (MOBIC) 15 MG tablet, Take 1 tablet by mouth Daily., Disp: , Rfl:     naltrexone (DEPADE) 50 MG tablet, Take 1/2 tablet by mouth for 3 days or until nausea subsides then increase to 1/2 tablet by mouth twice a day., Disp: 30 tablet, Rfl: 2    nebivolol (Bystolic) 2.5 MG tablet, Take 1 tablet by mouth Daily., Disp: 30 tablet, Rfl: 5    venlafaxine XR (Effexor XR) 37.5 MG 24 hr capsule, Take 1 capsule by mouth Daily., Disp: 30 capsule, Rfl: 5    venlafaxine XR (EFFEXOR-XR) 75 MG 24 hr capsule, Take 1 capsule by mouth Daily., Disp: 90 capsule, Rfl: 3    vitamin D (ERGOCALCIFEROL) 1.25 MG " (82808 UT) capsule capsule, Take 1 capsule by mouth 1 (One) Time Per Week., Disp: 12 capsule, Rfl: 0    Current Facility-Administered Medications:     cyanocobalamin injection 1,000 mcg, 1,000 mcg, Intramuscular, Q28 Days, Autumn Ya MD, 1,000 mcg at 08/29/24 1338    Assessment & Plan   Diagnoses and all orders for this visit:    1. Anxiety and depression (Primary)  -Slightly improved on increased dose of Effexor, continue at present dose    2. Benign essential HTN  -     nebivolol (Bystolic) 5 MG tablet; Take 1 tablet by mouth Daily.  Dispense: 30 tablet; Refill: 5  -Uncontrolled with home values, increase Bystolic to 5 mg.  Also advised her to stop by with her blood pressure cuff to compare to ours to make sure it is reliable             Mary Dixon MD      Answers submitted by the patient for this visit:  Primary Reason for Visit (Submitted on 12/18/2024)  What is the primary reason for your visit?: High Blood Pressure  High Blood Pressure Questionnaire (Submitted on 12/18/2024)  Chief Complaint: Hypertension  Chronicity: chronic  Onset: more than 1 year ago  Progression since onset: unchanged  Condition status: resistant  anxiety: Yes  blurred vision: No  chest pain: No  headaches: No  malaise/fatigue: Yes  orthopnea: No  palpitations: Yes  peripheral edema: No  shortness of breath: No  Agents associated with hypertension: anorectics, estrogens  CAD risks: dyslipidemia, family history, obesity, stress  Compliance problems: exercise

## 2025-01-07 RX ORDER — ESTRADIOL 1 MG/1
1 TABLET ORAL DAILY
Qty: 90 TABLET | Refills: 0 | Status: SHIPPED | OUTPATIENT
Start: 2025-01-07

## 2025-01-25 DIAGNOSIS — F32.A ANXIETY AND DEPRESSION: ICD-10-CM

## 2025-01-25 DIAGNOSIS — F41.9 ANXIETY AND DEPRESSION: ICD-10-CM

## 2025-01-27 RX ORDER — MELOXICAM 15 MG/1
15 TABLET ORAL DAILY
Qty: 90 TABLET | Refills: 0 | Status: SHIPPED | OUTPATIENT
Start: 2025-01-27

## 2025-01-27 RX ORDER — VENLAFAXINE HYDROCHLORIDE 75 MG/1
75 CAPSULE, EXTENDED RELEASE ORAL DAILY
Qty: 90 CAPSULE | Refills: 0 | Status: SHIPPED | OUTPATIENT
Start: 2025-01-27

## 2025-03-01 DIAGNOSIS — F41.9 ANXIETY AND DEPRESSION: ICD-10-CM

## 2025-03-01 DIAGNOSIS — F32.A ANXIETY AND DEPRESSION: ICD-10-CM

## 2025-03-03 RX ORDER — VENLAFAXINE HYDROCHLORIDE 37.5 MG/1
37.5 CAPSULE, EXTENDED RELEASE ORAL DAILY
Qty: 30 CAPSULE | Refills: 5 | Status: SHIPPED | OUTPATIENT
Start: 2025-03-03

## 2025-04-07 RX ORDER — ESTRADIOL 1 MG/1
1 TABLET ORAL DAILY
Qty: 90 TABLET | Refills: 2 | Status: SHIPPED | OUTPATIENT
Start: 2025-04-07

## 2025-04-26 DIAGNOSIS — F32.A ANXIETY AND DEPRESSION: ICD-10-CM

## 2025-04-26 DIAGNOSIS — F41.9 ANXIETY AND DEPRESSION: ICD-10-CM

## 2025-04-28 RX ORDER — MELOXICAM 15 MG/1
15 TABLET ORAL DAILY
Qty: 90 TABLET | Refills: 0 | Status: SHIPPED | OUTPATIENT
Start: 2025-04-28

## 2025-04-28 RX ORDER — VENLAFAXINE HYDROCHLORIDE 75 MG/1
75 CAPSULE, EXTENDED RELEASE ORAL DAILY
Qty: 90 CAPSULE | Refills: 0 | Status: SHIPPED | OUTPATIENT
Start: 2025-04-28

## 2025-05-12 ENCOUNTER — OFFICE VISIT (OUTPATIENT)
Dept: INTERNAL MEDICINE | Age: 55
End: 2025-05-12
Payer: COMMERCIAL

## 2025-05-12 VITALS
HEIGHT: 63 IN | BODY MASS INDEX: 32.6 KG/M2 | SYSTOLIC BLOOD PRESSURE: 120 MMHG | DIASTOLIC BLOOD PRESSURE: 74 MMHG | WEIGHT: 184 LBS | HEART RATE: 70 BPM | OXYGEN SATURATION: 90 %

## 2025-05-12 DIAGNOSIS — M17.12 PRIMARY OSTEOARTHRITIS OF LEFT KNEE: ICD-10-CM

## 2025-05-12 DIAGNOSIS — M25.562 CHRONIC PAIN OF LEFT KNEE: Primary | ICD-10-CM
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
## 2025-05-12 DIAGNOSIS — G89.29 CHRONIC PAIN OF LEFT KNEE: Primary | ICD-10-CM

## 2025-05-12 DIAGNOSIS — F41.9 ANXIETY AND DEPRESSION: ICD-10-CM

## 2025-05-12 DIAGNOSIS — F32.A ANXIETY AND DEPRESSION: ICD-10-CM

## 2025-05-12 PROCEDURE — 99214 OFFICE O/P EST MOD 30 MIN: CPT | Performed by: INTERNAL MEDICINE

## 2025-05-12 RX ORDER — VENLAFAXINE HYDROCHLORIDE 75 MG/1
150 CAPSULE, EXTENDED RELEASE ORAL DAILY
Qty: 180 CAPSULE | Refills: 3 | Status: SHIPPED | OUTPATIENT
Start: 2025-05-12

## 2025-05-12 NOTE — PROGRESS NOTES
"Subjective   Nita Mcmahon is a 55 y.o. female here for worsening mood and left knee pain.  She did have a fall down the stairs in February but did not seek medical attention at that time.  She says she fell down about 12 stairs and at the bottom of the stairs her left knee was turned underneath her.  She had difficulty walking for 2 days but has been able to weight-bear since then.  She has not noticed any overt swelling, but does feel a pop every time she bends the knee.  It is difficult to walk for a long time and she does get a limp.  She would like to see orthopedics and figure out what is going on.  She does have history of OA in that knee.  She last saw orthopedics in 2020 for knee pain.  She also has issues out of the right knee, but the left is really bothering her right now.  Also says mood is worse.  Patient very irritable and can be very snappy at work.  Forgot to take her Effexor 1 day and felt symptoms were exponentially worse, so the Effexor is working, she just wonders about the dosage.    I have reviewed the patient's relevant medical history and confirmed it is accurate.    I have personally reviewed and performed the ROS. Mary Dixon MD     Objective   /74 (BP Location: Right arm, Patient Position: Sitting)   Pulse 70   Ht 160 cm (62.99\")   Wt 83.5 kg (184 lb)   SpO2 90%   BMI 32.60 kg/m²     Physical Exam  Constitutional:       Appearance: She is well-developed.   Pulmonary:      Effort: Pulmonary effort is normal.   Musculoskeletal:      Left knee: Crepitus present. No swelling or deformity. Normal range of motion. Tenderness present over the medial joint line.   Neurological:      General: No focal deficit present.      Mental Status: She is alert.   Psychiatric:         Behavior: Behavior normal.         Thought Content: Thought content normal.         Judgment: Judgment normal.           Current Outpatient Medications:     estradiol (ESTRACE) 1 MG tablet, Take 1 " tablet by mouth once daily, Disp: 90 tablet, Rfl: 2    levocetirizine (Xyzal Allergy 24HR) 5 MG tablet, Take 1 tablet by mouth Daily., Disp: , Rfl:     meloxicam (MOBIC) 15 MG tablet, Take 1 tablet by mouth once daily, Disp: 90 tablet, Rfl: 0    naltrexone (DEPADE) 50 MG tablet, Take 1/2 tablet by mouth for 3 days or until nausea subsides then increase to 1/2 tablet by mouth twice a day., Disp: 30 tablet, Rfl: 2    nebivolol (Bystolic) 5 MG tablet, Take 1 tablet by mouth Daily., Disp: 30 tablet, Rfl: 5    venlafaxine XR (Effexor XR) 37.5 MG 24 hr capsule, Take 1 capsule by mouth Daily., Disp: 30 capsule, Rfl: 5    venlafaxine XR (EFFEXOR-XR) 75 MG 24 hr capsule, Take 1 capsule by mouth once daily, Disp: 90 capsule, Rfl: 0    vitamin D (ERGOCALCIFEROL) 1.25 MG (44434 UT) capsule capsule, Take 1 capsule by mouth 1 (One) Time Per Week., Disp: 12 capsule, Rfl: 0    Current Facility-Administered Medications:     cyanocobalamin injection 1,000 mcg, 1,000 mcg, Intramuscular, Q28 Days, Autumn Ya MD, 1,000 mcg at 08/29/24 1338    Assessment & Plan   Diagnoses and all orders for this visit:    1. Chronic pain of left knee (Primary)  See #3    2. Fall, initial encounter  - See #3    3. Primary osteoarthritis of left knee  -     Ambulatory Referral to Orthopedic Surgery  -     MRI Knee Left Without Contrast; Future    4. Anxiety and depression  -     venlafaxine XR (EFFEXOR-XR) 75 MG 24 hr capsule; Take 2 capsules by mouth Daily.  Dispense: 180 capsule; Refill: 3  - Worsening, increase to 150 mg             Mary Dixon MD

## 2025-05-22 ENCOUNTER — OFFICE VISIT (OUTPATIENT)
Dept: ORTHOPEDIC SURGERY | Facility: CLINIC | Age: 55
End: 2025-05-22
Payer: COMMERCIAL

## 2025-05-22 VITALS
SYSTOLIC BLOOD PRESSURE: 118 MMHG | HEIGHT: 63 IN | DIASTOLIC BLOOD PRESSURE: 80 MMHG | BODY MASS INDEX: 32.6 KG/M2 | WEIGHT: 184 LBS

## 2025-05-22 DIAGNOSIS — E66.09 CLASS 1 OBESITY DUE TO EXCESS CALORIES WITHOUT SERIOUS COMORBIDITY WITH BODY MASS INDEX (BMI) OF 32.0 TO 32.9 IN ADULT: ICD-10-CM

## 2025-05-22 DIAGNOSIS — M25.561 PAIN IN BOTH KNEES, UNSPECIFIED CHRONICITY: ICD-10-CM

## 2025-05-22 DIAGNOSIS — M17.0 PRIMARY OSTEOARTHRITIS OF BOTH KNEES: Primary | ICD-10-CM

## 2025-05-22 DIAGNOSIS — M25.562 PAIN IN BOTH KNEES, UNSPECIFIED CHRONICITY: ICD-10-CM

## 2025-05-22 DIAGNOSIS — E66.811 CLASS 1 OBESITY DUE TO EXCESS CALORIES WITHOUT SERIOUS COMORBIDITY WITH BODY MASS INDEX (BMI) OF 32.0 TO 32.9 IN ADULT: ICD-10-CM

## 2025-05-22 RX ORDER — TRIAMCINOLONE ACETONIDE 40 MG/ML
80 INJECTION, SUSPENSION INTRA-ARTICULAR; INTRAMUSCULAR
Status: COMPLETED | OUTPATIENT
Start: 2025-05-22 | End: 2025-05-22

## 2025-05-22 RX ORDER — LIDOCAINE HYDROCHLORIDE 10 MG/ML
3 INJECTION, SOLUTION EPIDURAL; INFILTRATION; INTRACAUDAL; PERINEURAL
Status: COMPLETED | OUTPATIENT
Start: 2025-05-22 | End: 2025-05-22

## 2025-05-22 RX ORDER — BUPIVACAINE HYDROCHLORIDE 2.5 MG/ML
3 INJECTION, SOLUTION EPIDURAL; INFILTRATION; INTRACAUDAL; PERINEURAL
Status: COMPLETED | OUTPATIENT
Start: 2025-05-22 | End: 2025-05-22

## 2025-05-22 RX ORDER — VENLAFAXINE HYDROCHLORIDE 37.5 MG/1
37.5 CAPSULE, EXTENDED RELEASE ORAL DAILY
COMMUNITY

## 2025-05-22 RX ADMIN — BUPIVACAINE HYDROCHLORIDE 3 ML: 2.5 INJECTION, SOLUTION EPIDURAL; INFILTRATION; INTRACAUDAL; PERINEURAL at 09:00

## 2025-05-22 RX ADMIN — LIDOCAINE HYDROCHLORIDE 3 ML: 10 INJECTION, SOLUTION EPIDURAL; INFILTRATION; INTRACAUDAL; PERINEURAL at 09:00

## 2025-05-22 RX ADMIN — TRIAMCINOLONE ACETONIDE 80 MG: 40 INJECTION, SUSPENSION INTRA-ARTICULAR; INTRAMUSCULAR at 09:00
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
## 2025-05-22 NOTE — PROGRESS NOTES
Procedure   - Large Joint Arthrocentesis: bilateral knee on 5/22/2025 9:00 AM  Indications: pain  Details: 21 G needle, superolateral approach  Medications (Right): 3 mL bupivacaine (PF) 0.25 %; 3 mL lidocaine PF 1% 1 %; 80 mg triamcinolone acetonide 40 MG/ML  Medications (Left): 3 mL bupivacaine (PF) 0.25 %; 3 mL lidocaine PF 1% 1 %; 80 mg triamcinolone acetonide 40 MG/ML  Outcome: tolerated well, no immediate complications  Procedure, treatment alternatives, risks and benefits explained, specific risks discussed. Consent was given by the patient. Immediately prior to procedure a time out was called to verify the correct patient, procedure, equipment, support staff and site/side marked as required. Patient was prepped and draped in the usual sterile fashion.

## 2025-05-23 ENCOUNTER — TELEPHONE (OUTPATIENT)
Dept: INTERNAL MEDICINE | Age: 55
End: 2025-05-23
Payer: COMMERCIAL

## 2025-07-26 DIAGNOSIS — F32.A ANXIETY AND DEPRESSION: ICD-10-CM

## 2025-07-26 DIAGNOSIS — F41.9 ANXIETY AND DEPRESSION: ICD-10-CM

## 2025-07-28 RX ORDER — MELOXICAM 15 MG/1
15 TABLET ORAL DAILY
Qty: 90 TABLET | Refills: 2 | Status: SHIPPED | OUTPATIENT
Start: 2025-07-28

## 2025-07-28 RX ORDER — VENLAFAXINE HYDROCHLORIDE 75 MG/1
75 CAPSULE, EXTENDED RELEASE ORAL DAILY
Qty: 90 CAPSULE | Refills: 2 | Status: SHIPPED | OUTPATIENT
Start: 2025-07-28

## 2025-08-12 ENCOUNTER — OFFICE VISIT (OUTPATIENT)
Dept: INTERNAL MEDICINE | Age: 55
End: 2025-08-12
Payer: COMMERCIAL

## 2025-08-12 VITALS
HEIGHT: 63 IN | HEART RATE: 72 BPM | OXYGEN SATURATION: 97 % | SYSTOLIC BLOOD PRESSURE: 144 MMHG | WEIGHT: 188 LBS | DIASTOLIC BLOOD PRESSURE: 88 MMHG | BODY MASS INDEX: 33.31 KG/M2

## 2025-08-12 DIAGNOSIS — G89.29 CHRONIC BILATERAL LOW BACK PAIN WITHOUT SCIATICA: ICD-10-CM

## 2025-08-12 DIAGNOSIS — F41.9 ANXIETY AND DEPRESSION: Primary | ICD-10-CM

## 2025-08-12 DIAGNOSIS — I10 BENIGN ESSENTIAL HTN: ICD-10-CM

## 2025-08-12 DIAGNOSIS — M54.50 CHRONIC BILATERAL LOW BACK PAIN WITHOUT SCIATICA: ICD-10-CM

## 2025-08-12 DIAGNOSIS — F32.A ANXIETY AND DEPRESSION: Primary | ICD-10-CM

## 2025-08-12 PROCEDURE — 99214 OFFICE O/P EST MOD 30 MIN: CPT | Performed by: INTERNAL MEDICINE

## 2025-08-12 RX ORDER — CELECOXIB 200 MG/1
200 CAPSULE ORAL DAILY
Qty: 90 CAPSULE | Refills: 3 | Status: SHIPPED | OUTPATIENT
Start: 2025-08-12

## 2025-08-12 RX ORDER — NEBIVOLOL 5 MG/1
5 TABLET ORAL DAILY
Qty: 90 TABLET | Refills: 2 | Status: SHIPPED | OUTPATIENT
Start: 2025-08-12

## 2025-08-12 RX ORDER — VENLAFAXINE HYDROCHLORIDE 37.5 MG/1
37.5 CAPSULE, EXTENDED RELEASE ORAL DAILY
Qty: 90 CAPSULE | Refills: 3 | Status: SHIPPED | OUTPATIENT
Start: 2025-08-12

## (undated) DEVICE — ADHS SKIN PREMIERPRO EXOFIN TOPICAL HI/VISC .5ML

## (undated) DEVICE — DISPOSABLE BIPOLAR FORCEPS 7 3/4" (19.7CM) SCOVILLE BAYONET, INSULATED, 1.5MM TIP AND 12 FT. (3.6M) CABLE: Brand: KIRWAN

## (undated) DEVICE — DRP CLEARVIEWTEARAWAY O/ARM

## (undated) DEVICE — NDL HYPO ECLPS SFTY 25G 1 1/2IN

## (undated) DEVICE — TB SXN FRAZIER 10F STRL

## (undated) DEVICE — Device

## (undated) DEVICE — PENCL ROCKRSWCH MEGADYNE W/HOLSTR 10FT SS

## (undated) DEVICE — SUT VICYL 2/0 CR8 18IN DYED J726D

## (undated) DEVICE — ELECTRD BLD EZ CLN MOD XLNG 2.75IN

## (undated) DEVICE — SPNG LAP PREWSH SFTPK 18X18IN STRL PK/5

## (undated) DEVICE — PACK,UNIVERSAL,NO GOWNS: Brand: MEDLINE

## (undated) DEVICE — JP PERF DRN SIL FLT 10MM FULL: Brand: CARDINAL HEALTH

## (undated) DEVICE — GLV SURG BIOGEL LTX PF 8

## (undated) DEVICE — DRSNG WND GZ PAD BORDERED 4X8IN STRL

## (undated) DEVICE — TOOL 14MH30 LEGEND 14CM 3MM: Brand: MIDAS REX ™

## (undated) DEVICE — NEEDLE, QUINCKE 22GX3.5": Brand: MEDLINE INDUSTRIES, INC.

## (undated) DEVICE — TOOL 14BA60 LEGEND 14CM 6MM: Brand: MIDAS REX ™

## (undated) DEVICE — UNDERGLV SURG BIOGEL INDICAT PI SZ8.5 BLU

## (undated) DEVICE — JACKSON-PRATT 100CC BULB RESERVOIR: Brand: CARDINAL HEALTH

## (undated) DEVICE — GOWN,NON-REINFORCED,SIRUS,SET IN SLV,XXL: Brand: MEDLINE

## (undated) DEVICE — APPL CHLORAPREP TINTED 26ML TEAL

## (undated) DEVICE — SPNG GZ WOVN 4X4IN 12PLY 10/BX STRL

## (undated) DEVICE — GLV SURG PREMIERPRO MIC LTX PF SZ7 BRN

## (undated) DEVICE — SYR CONTRL LUERLOK 10CC

## (undated) DEVICE — GLV SURG PREMIERPRO MIC LTX PF SZ6.5 BRN

## (undated) DEVICE — ELECTRD BLD EZ CLN MOD 4IN

## (undated) DEVICE — TRAP,MUCUS SPECIMEN,40CC: Brand: MEDLINE

## (undated) DEVICE — STRAP POSTN KN/BDY FM 5X72IN DISP

## (undated) DEVICE — PK NEURO DISC 10

## (undated) DEVICE — 3M™ MEDIPORE™ H SOFT CLOTH SURGICAL TAPE, 2863, 3 IN X 10 YD, 12/CASE: Brand: 3M™ MEDIPORE™

## (undated) DEVICE — BLANKT WARM UPPR/BDY ARM/OUT 57X196CM

## (undated) DEVICE — ANTIBACTERIAL UNDYED BRAIDED (POLYGLACTIN 910), SYNTHETIC ABSORBABLE SUTURE: Brand: COATED VICRYL

## (undated) DEVICE — SPHR MARKR STEALTH STATION

## (undated) DEVICE — SNAP KOVER: Brand: UNBRANDED

## (undated) DEVICE — 3M™ STERI-DRAPE™ INSTRUMENT POUCH 1018: Brand: STERI-DRAPE™

## (undated) DEVICE — TB SXN FRAZIER 12F STRL